# Patient Record
Sex: FEMALE | Race: WHITE | NOT HISPANIC OR LATINO | URBAN - METROPOLITAN AREA
[De-identification: names, ages, dates, MRNs, and addresses within clinical notes are randomized per-mention and may not be internally consistent; named-entity substitution may affect disease eponyms.]

---

## 2018-03-22 ENCOUNTER — OUTPATIENT (OUTPATIENT)
Dept: OUTPATIENT SERVICES | Facility: HOSPITAL | Age: 83
LOS: 1 days | Discharge: HOME | End: 2018-03-22

## 2018-03-23 DIAGNOSIS — I10 ESSENTIAL (PRIMARY) HYPERTENSION: ICD-10-CM

## 2019-04-11 ENCOUNTER — OUTPATIENT (OUTPATIENT)
Dept: OUTPATIENT SERVICES | Facility: HOSPITAL | Age: 84
LOS: 1 days | Discharge: HOME | End: 2019-04-11

## 2019-04-11 DIAGNOSIS — E78.5 HYPERLIPIDEMIA, UNSPECIFIED: ICD-10-CM

## 2019-04-11 DIAGNOSIS — I25.10 ATHEROSCLEROTIC HEART DISEASE OF NATIVE CORONARY ARTERY WITHOUT ANGINA PECTORIS: ICD-10-CM

## 2019-04-11 DIAGNOSIS — I10 ESSENTIAL (PRIMARY) HYPERTENSION: ICD-10-CM

## 2019-04-11 DIAGNOSIS — I48.91 UNSPECIFIED ATRIAL FIBRILLATION: ICD-10-CM

## 2019-04-13 ENCOUNTER — OUTPATIENT (OUTPATIENT)
Dept: OUTPATIENT SERVICES | Facility: HOSPITAL | Age: 84
LOS: 1 days | Discharge: HOME | End: 2019-04-13

## 2019-04-13 DIAGNOSIS — I48.91 UNSPECIFIED ATRIAL FIBRILLATION: ICD-10-CM

## 2019-04-13 DIAGNOSIS — E78.5 HYPERLIPIDEMIA, UNSPECIFIED: ICD-10-CM

## 2019-04-13 DIAGNOSIS — I25.10 ATHEROSCLEROTIC HEART DISEASE OF NATIVE CORONARY ARTERY WITHOUT ANGINA PECTORIS: ICD-10-CM

## 2019-04-13 DIAGNOSIS — I10 ESSENTIAL (PRIMARY) HYPERTENSION: ICD-10-CM

## 2019-08-05 ENCOUNTER — OUTPATIENT (OUTPATIENT)
Dept: OUTPATIENT SERVICES | Facility: HOSPITAL | Age: 84
LOS: 1 days | Discharge: HOME | End: 2019-08-05

## 2019-08-05 DIAGNOSIS — I25.10 ATHEROSCLEROTIC HEART DISEASE OF NATIVE CORONARY ARTERY WITHOUT ANGINA PECTORIS: ICD-10-CM

## 2019-08-05 DIAGNOSIS — I48.91 UNSPECIFIED ATRIAL FIBRILLATION: ICD-10-CM

## 2021-04-15 ENCOUNTER — INPATIENT (INPATIENT)
Facility: HOSPITAL | Age: 86
LOS: 6 days | Discharge: HOME HEALTH | End: 2021-04-22
Attending: INTERNAL MEDICINE | Admitting: INTERNAL MEDICINE
Payer: MEDICARE

## 2021-04-15 VITALS
RESPIRATION RATE: 20 BRPM | SYSTOLIC BLOOD PRESSURE: 108 MMHG | DIASTOLIC BLOOD PRESSURE: 66 MMHG | OXYGEN SATURATION: 95 % | HEART RATE: 122 BPM

## 2021-04-15 LAB
ALBUMIN SERPL ELPH-MCNC: 3 G/DL — LOW (ref 3.5–5.2)
ALBUMIN SERPL ELPH-MCNC: 3.2 G/DL — LOW (ref 3.5–5.2)
ALP SERPL-CCNC: 65 U/L — SIGNIFICANT CHANGE UP (ref 30–115)
ALP SERPL-CCNC: 67 U/L — SIGNIFICANT CHANGE UP (ref 30–115)
ALT FLD-CCNC: 45 U/L — HIGH (ref 0–41)
ALT FLD-CCNC: 46 U/L — HIGH (ref 0–41)
ANION GAP SERPL CALC-SCNC: 15 MMOL/L — HIGH (ref 7–14)
ANION GAP SERPL CALC-SCNC: 25 MMOL/L — HIGH (ref 7–14)
APTT BLD: 27.2 SEC — SIGNIFICANT CHANGE UP (ref 27–39.2)
AST SERPL-CCNC: 128 U/L — HIGH (ref 0–41)
AST SERPL-CCNC: 151 U/L — HIGH (ref 0–41)
BASE EXCESS BLDV CALC-SCNC: -17.1 MMOL/L — LOW (ref -2–2)
BASE EXCESS BLDV CALC-SCNC: -7.3 MMOL/L — LOW (ref -2–2)
BASOPHILS # BLD AUTO: 0.02 K/UL — SIGNIFICANT CHANGE UP (ref 0–0.2)
BASOPHILS NFR BLD AUTO: 0.1 % — SIGNIFICANT CHANGE UP (ref 0–1)
BILIRUB SERPL-MCNC: 1.5 MG/DL — HIGH (ref 0.2–1.2)
BILIRUB SERPL-MCNC: 1.7 MG/DL — HIGH (ref 0.2–1.2)
BLD GP AB SCN SERPL QL: SIGNIFICANT CHANGE UP
BUN SERPL-MCNC: 66 MG/DL — CRITICAL HIGH (ref 10–20)
BUN SERPL-MCNC: 73 MG/DL — CRITICAL HIGH (ref 10–20)
BURR CELLS BLD QL SMEAR: PRESENT — SIGNIFICANT CHANGE UP
CA-I SERPL-SCNC: 1.15 MMOL/L — SIGNIFICANT CHANGE UP (ref 1.12–1.3)
CA-I SERPL-SCNC: 1.15 MMOL/L — SIGNIFICANT CHANGE UP (ref 1.12–1.3)
CALCIUM SERPL-MCNC: 8.2 MG/DL — LOW (ref 8.5–10.1)
CALCIUM SERPL-MCNC: 9 MG/DL — SIGNIFICANT CHANGE UP (ref 8.5–10.1)
CHLORIDE SERPL-SCNC: 104 MMOL/L — SIGNIFICANT CHANGE UP (ref 98–110)
CHLORIDE SERPL-SCNC: 114 MMOL/L — HIGH (ref 98–110)
CK SERPL-CCNC: 6376 U/L — HIGH (ref 0–225)
CO2 SERPL-SCNC: 10 MMOL/L — LOW (ref 17–32)
CO2 SERPL-SCNC: 13 MMOL/L — LOW (ref 17–32)
CREAT SERPL-MCNC: 2.4 MG/DL — HIGH (ref 0.7–1.5)
CREAT SERPL-MCNC: 2.6 MG/DL — HIGH (ref 0.7–1.5)
EOSINOPHIL # BLD AUTO: 0 K/UL — SIGNIFICANT CHANGE UP (ref 0–0.7)
EOSINOPHIL NFR BLD AUTO: 0 % — SIGNIFICANT CHANGE UP (ref 0–8)
GAS PNL BLDV: 143 MMOL/L — SIGNIFICANT CHANGE UP (ref 136–145)
GAS PNL BLDV: 143 MMOL/L — SIGNIFICANT CHANGE UP (ref 136–145)
GAS PNL BLDV: SIGNIFICANT CHANGE UP
GAS PNL BLDV: SIGNIFICANT CHANGE UP
GLUCOSE SERPL-MCNC: 72 MG/DL — SIGNIFICANT CHANGE UP (ref 70–99)
GLUCOSE SERPL-MCNC: 90 MG/DL — SIGNIFICANT CHANGE UP (ref 70–99)
HCO3 BLDV-SCNC: 10 MMOL/L — LOW (ref 22–29)
HCO3 BLDV-SCNC: 19 MMOL/L — LOW (ref 22–29)
HCT VFR BLD CALC: 21.3 % — LOW (ref 37–47)
HCT VFR BLD CALC: 27.3 % — LOW (ref 37–47)
HCT VFR BLDA CALC: 27.9 % — LOW (ref 34–44)
HCT VFR BLDA CALC: 44.1 % — HIGH (ref 34–44)
HGB BLD CALC-MCNC: 14.4 G/DL — SIGNIFICANT CHANGE UP (ref 14–18)
HGB BLD CALC-MCNC: 9.1 G/DL — LOW (ref 14–18)
HGB BLD-MCNC: 6.5 G/DL — CRITICAL LOW (ref 12–16)
HGB BLD-MCNC: 8.8 G/DL — LOW (ref 12–16)
HOROWITZ INDEX BLDV+IHG-RTO: 21 — SIGNIFICANT CHANGE UP
HOROWITZ INDEX BLDV+IHG-RTO: 21 — SIGNIFICANT CHANGE UP
HYPOCHROMIA BLD QL: SIGNIFICANT CHANGE UP
IMM GRANULOCYTES NFR BLD AUTO: 1.4 % — HIGH (ref 0.1–0.3)
INR BLD: 2.57 RATIO — HIGH (ref 0.65–1.3)
LACTATE BLDV-MCNC: 2.8 MMOL/L — HIGH (ref 0.5–1.6)
LACTATE BLDV-MCNC: 9.2 MMOL/L — HIGH (ref 0.5–1.6)
LACTATE SERPL-SCNC: 3.1 MMOL/L — HIGH (ref 0.7–2)
LYMPHOCYTES # BLD AUTO: 0.83 K/UL — LOW (ref 1.2–3.4)
LYMPHOCYTES # BLD AUTO: 3.7 % — LOW (ref 20.5–51.1)
MACROCYTES BLD QL: SIGNIFICANT CHANGE UP
MAGNESIUM SERPL-MCNC: 2.7 MG/DL — HIGH (ref 1.8–2.4)
MAGNESIUM SERPL-MCNC: 2.9 MG/DL — HIGH (ref 1.8–2.4)
MANUAL SMEAR VERIFICATION: SIGNIFICANT CHANGE UP
MCHC RBC-ENTMCNC: 30.5 G/DL — LOW (ref 32–37)
MCHC RBC-ENTMCNC: 32.2 G/DL — SIGNIFICANT CHANGE UP (ref 32–37)
MCHC RBC-ENTMCNC: 32.8 PG — HIGH (ref 27–31)
MCHC RBC-ENTMCNC: 33.3 PG — HIGH (ref 27–31)
MCV RBC AUTO: 101.9 FL — HIGH (ref 81–99)
MCV RBC AUTO: 109.2 FL — HIGH (ref 81–99)
MONOCYTES # BLD AUTO: 2.15 K/UL — HIGH (ref 0.1–0.6)
MONOCYTES NFR BLD AUTO: 9.7 % — HIGH (ref 1.7–9.3)
NEUTROPHILS # BLD AUTO: 18.93 K/UL — HIGH (ref 1.4–6.5)
NEUTROPHILS NFR BLD AUTO: 85.1 % — HIGH (ref 42.2–75.2)
NEUTS BAND # BLD: 2 % — SIGNIFICANT CHANGE UP (ref 0–6)
NRBC # BLD: 0 /100 WBCS — SIGNIFICANT CHANGE UP (ref 0–0)
NRBC # BLD: 0 /100 WBCS — SIGNIFICANT CHANGE UP (ref 0–0)
NRBC # BLD: 0 /100 — SIGNIFICANT CHANGE UP (ref 0–0)
PCO2 BLDV: 25 MMHG — LOW (ref 39–42)
PCO2 BLDV: 39 MMHG — SIGNIFICANT CHANGE UP (ref 39–42)
PH BLDV: 7.19 — LOW (ref 7.26–7.43)
PH BLDV: 7.29 — SIGNIFICANT CHANGE UP (ref 7.26–7.43)
PLAT MORPH BLD: NORMAL — SIGNIFICANT CHANGE UP
PLATELET # BLD AUTO: 231 K/UL — SIGNIFICANT CHANGE UP (ref 130–400)
PLATELET # BLD AUTO: 290 K/UL — SIGNIFICANT CHANGE UP (ref 130–400)
PO2 BLDV: 20 MMHG — SIGNIFICANT CHANGE UP (ref 20–40)
PO2 BLDV: 28 MMHG — SIGNIFICANT CHANGE UP (ref 20–40)
POLYCHROMASIA BLD QL SMEAR: SLIGHT — SIGNIFICANT CHANGE UP
POTASSIUM BLDV-SCNC: 4.5 MMOL/L — SIGNIFICANT CHANGE UP (ref 3.3–5.6)
POTASSIUM BLDV-SCNC: 5.3 MMOL/L — SIGNIFICANT CHANGE UP (ref 3.3–5.6)
POTASSIUM SERPL-MCNC: 4.9 MMOL/L — SIGNIFICANT CHANGE UP (ref 3.5–5)
POTASSIUM SERPL-MCNC: 5.6 MMOL/L — HIGH (ref 3.5–5)
POTASSIUM SERPL-SCNC: 4.9 MMOL/L — SIGNIFICANT CHANGE UP (ref 3.5–5)
POTASSIUM SERPL-SCNC: 5.6 MMOL/L — HIGH (ref 3.5–5)
PROT SERPL-MCNC: 5.2 G/DL — LOW (ref 6–8)
PROT SERPL-MCNC: 5.4 G/DL — LOW (ref 6–8)
PROTHROM AB SERPL-ACNC: 29.5 SEC — HIGH (ref 9.95–12.87)
RAPID RVP RESULT: SIGNIFICANT CHANGE UP
RBC # BLD: 1.95 M/UL — LOW (ref 4.2–5.4)
RBC # BLD: 2.68 M/UL — LOW (ref 4.2–5.4)
RBC # FLD: 17.2 % — HIGH (ref 11.5–14.5)
RBC # FLD: 17.6 % — HIGH (ref 11.5–14.5)
RBC BLD AUTO: ABNORMAL
SAO2 % BLDV: 24 % — SIGNIFICANT CHANGE UP
SAO2 % BLDV: 35 % — SIGNIFICANT CHANGE UP
SARS-COV-2 RNA SPEC QL NAA+PROBE: SIGNIFICANT CHANGE UP
SCHISTOCYTES BLD QL AUTO: SLIGHT — SIGNIFICANT CHANGE UP
SODIUM SERPL-SCNC: 139 MMOL/L — SIGNIFICANT CHANGE UP (ref 135–146)
SODIUM SERPL-SCNC: 142 MMOL/L — SIGNIFICANT CHANGE UP (ref 135–146)
TROPONIN T SERPL-MCNC: 0.09 NG/ML — CRITICAL HIGH
WBC # BLD: 19.92 K/UL — HIGH (ref 4.8–10.8)
WBC # BLD: 22.25 K/UL — HIGH (ref 4.8–10.8)
WBC # FLD AUTO: 19.92 K/UL — HIGH (ref 4.8–10.8)
WBC # FLD AUTO: 22.25 K/UL — HIGH (ref 4.8–10.8)

## 2021-04-15 PROCEDURE — 72170 X-RAY EXAM OF PELVIS: CPT | Mod: 26

## 2021-04-15 PROCEDURE — 73552 X-RAY EXAM OF FEMUR 2/>: CPT | Mod: 26,RT

## 2021-04-15 PROCEDURE — 71045 X-RAY EXAM CHEST 1 VIEW: CPT | Mod: 26

## 2021-04-15 PROCEDURE — 71045 X-RAY EXAM CHEST 1 VIEW: CPT | Mod: 26,77

## 2021-04-15 PROCEDURE — 99291 CRITICAL CARE FIRST HOUR: CPT | Mod: CS

## 2021-04-15 PROCEDURE — 71250 CT THORAX DX C-: CPT | Mod: 26

## 2021-04-15 PROCEDURE — 72125 CT NECK SPINE W/O DYE: CPT | Mod: 26

## 2021-04-15 PROCEDURE — 74176 CT ABD & PELVIS W/O CONTRAST: CPT | Mod: 26

## 2021-04-15 PROCEDURE — 70450 CT HEAD/BRAIN W/O DYE: CPT | Mod: 26

## 2021-04-15 PROCEDURE — 51703 INSERT BLADDER CATH COMPLEX: CPT | Mod: 59

## 2021-04-15 PROCEDURE — 43752 NASAL/OROGASTRIC W/TUBE PLMT: CPT

## 2021-04-15 PROCEDURE — 99284 EMERGENCY DEPT VISIT MOD MDM: CPT

## 2021-04-15 PROCEDURE — 99223 1ST HOSP IP/OBS HIGH 75: CPT

## 2021-04-15 RX ORDER — SODIUM CHLORIDE 9 MG/ML
250 INJECTION INTRAMUSCULAR; INTRAVENOUS; SUBCUTANEOUS ONCE
Refills: 0 | Status: COMPLETED | OUTPATIENT
Start: 2021-04-15 | End: 2021-04-15

## 2021-04-15 RX ORDER — DEXTROSE 50 % IN WATER 50 %
50 SYRINGE (ML) INTRAVENOUS ONCE
Refills: 0 | Status: COMPLETED | OUTPATIENT
Start: 2021-04-15 | End: 2021-04-15

## 2021-04-15 RX ORDER — PANTOPRAZOLE SODIUM 20 MG/1
8 TABLET, DELAYED RELEASE ORAL
Qty: 80 | Refills: 0 | Status: DISCONTINUED | OUTPATIENT
Start: 2021-04-15 | End: 2021-04-20

## 2021-04-15 RX ORDER — SODIUM CHLORIDE 9 MG/ML
1000 INJECTION INTRAMUSCULAR; INTRAVENOUS; SUBCUTANEOUS
Refills: 0 | Status: DISCONTINUED | OUTPATIENT
Start: 2021-04-15 | End: 2021-04-16

## 2021-04-15 RX ORDER — METOPROLOL TARTRATE 50 MG
0 TABLET ORAL
Qty: 0 | Refills: 1 | DISCHARGE

## 2021-04-15 RX ORDER — ATORVASTATIN CALCIUM 80 MG/1
40 TABLET, FILM COATED ORAL AT BEDTIME
Refills: 0 | Status: DISCONTINUED | OUTPATIENT
Start: 2021-04-15 | End: 2021-04-16

## 2021-04-15 RX ORDER — PANTOPRAZOLE SODIUM 20 MG/1
80 TABLET, DELAYED RELEASE ORAL ONCE
Refills: 0 | Status: COMPLETED | OUTPATIENT
Start: 2021-04-15 | End: 2021-04-15

## 2021-04-15 RX ORDER — CHLORHEXIDINE GLUCONATE 213 G/1000ML
1 SOLUTION TOPICAL
Refills: 0 | Status: DISCONTINUED | OUTPATIENT
Start: 2021-04-15 | End: 2021-04-22

## 2021-04-15 RX ORDER — METOPROLOL TARTRATE 50 MG
75 TABLET ORAL DAILY
Refills: 0 | Status: DISCONTINUED | OUTPATIENT
Start: 2021-04-15 | End: 2021-04-16

## 2021-04-15 RX ORDER — PIPERACILLIN AND TAZOBACTAM 4; .5 G/20ML; G/20ML
3.38 INJECTION, POWDER, LYOPHILIZED, FOR SOLUTION INTRAVENOUS ONCE
Refills: 0 | Status: COMPLETED | OUTPATIENT
Start: 2021-04-15 | End: 2021-04-15

## 2021-04-15 RX ORDER — ALPRAZOLAM 0.25 MG
0.25 TABLET ORAL DAILY
Refills: 0 | Status: DISCONTINUED | OUTPATIENT
Start: 2021-04-15 | End: 2021-04-22

## 2021-04-15 RX ORDER — MEROPENEM 1 G/30ML
500 INJECTION INTRAVENOUS EVERY 12 HOURS
Refills: 0 | Status: DISCONTINUED | OUTPATIENT
Start: 2021-04-15 | End: 2021-04-16

## 2021-04-15 RX ORDER — METRONIDAZOLE 500 MG
500 TABLET ORAL ONCE
Refills: 0 | Status: COMPLETED | OUTPATIENT
Start: 2021-04-15 | End: 2021-04-15

## 2021-04-15 RX ADMIN — SODIUM CHLORIDE 75 MILLILITER(S): 9 INJECTION INTRAMUSCULAR; INTRAVENOUS; SUBCUTANEOUS at 21:12

## 2021-04-15 RX ADMIN — PIPERACILLIN AND TAZOBACTAM 200 GRAM(S): 4; .5 INJECTION, POWDER, LYOPHILIZED, FOR SOLUTION INTRAVENOUS at 18:35

## 2021-04-15 RX ADMIN — Medication 100 MILLIGRAM(S): at 17:53

## 2021-04-15 RX ADMIN — PANTOPRAZOLE SODIUM 10 MG/HR: 20 TABLET, DELAYED RELEASE ORAL at 13:54

## 2021-04-15 RX ADMIN — PANTOPRAZOLE SODIUM 80 MILLIGRAM(S): 20 TABLET, DELAYED RELEASE ORAL at 13:05

## 2021-04-15 RX ADMIN — SODIUM CHLORIDE 500 MILLILITER(S): 9 INJECTION INTRAMUSCULAR; INTRAVENOUS; SUBCUTANEOUS at 13:54

## 2021-04-15 RX ADMIN — Medication 50 MILLILITER(S): at 12:55

## 2021-04-15 RX ADMIN — PANTOPRAZOLE SODIUM 10 MG/HR: 20 TABLET, DELAYED RELEASE ORAL at 18:35

## 2021-04-15 RX ADMIN — SODIUM CHLORIDE 250 MILLILITER(S): 9 INJECTION INTRAMUSCULAR; INTRAVENOUS; SUBCUTANEOUS at 15:01

## 2021-04-15 RX ADMIN — Medication 50 MILLILITER(S): at 12:50

## 2021-04-15 NOTE — ED PROVIDER NOTE - PROGRESS NOTE DETAILS
pt with "low" glucose in field, on arrival <10 on fingerstick, given 2 amps D50 with repeat glucose 11 confirmed with other glucometer, vbg blood from pre-dextrose stick 80, blood redrawn from vein and tested, glucose 295, fingers cold and pale, likely spurious fingersticks radiology reports perforated gastric ulcer. surgery consulted GUS Petty aware. patient given zosyn and IV flagyl. patient to remain NPO. family aware of findings spoke with patient and family regarding results of lab testing. ordered PRBC x 2. patient given iv bolus x 2 of 250 ns. patient given protonix bolus and drip . will hold xarelto. last taken last night spoke with GUS Hernadez of GI who will evlaute patient rpt lactate improved. patient evaluated by ICU will admit to critical care dr. hanna aware of admission

## 2021-04-15 NOTE — H&P ADULT - NSHPPHYSICALEXAM_GEN_ALL_CORE
PHYSICAL EXAM:  GENERAL: NAD, well-groomed, well-developed  HEAD:  Atraumatic, Normocephalic  EYES: Pale conjunctivae   NECK: Supple, No JVD, Normal thyroid  NERVOUS SYSTEM:  Alert & Oriented X3, Good concentration; Motor Strength 5/5 B/L upper and lower extremities; DTRs 2+ intact and symmetric  CHEST/LUNG: Clear to percussion bilaterally; No rales, rhonchi, wheezing, or rubs  HEART: Regular rate and rhythm; No murmurs, rubs, or gallops  ABDOMEN: Soft, Nontender, Nondistended; Bowel sounds present  EXTREMITIES:  2+ Peripheral Pulses, No clubbing, cyanosis, or edema  LYMPH: No lymphadenopathy noted  SKIN: No rashes or lesions

## 2021-04-15 NOTE — CONSULT NOTE ADULT - ASSESSMENT
89 F with PMHx of [place] on Xarelto, GERD (diagnosed >5 years ago with no EGD/colonoscopy) on PPI, with intermittent dark stools, found down with CT findings of punctate foci of free intraperitoneal air in the upper abdomen    -no physical exam findings to suggest acute surgical abdomen   -no indication for surgical intervention at this time   -please place NG tube to low continuous suction, communicated to ED PA  -recommend keeping patient NPO with IV abx  -GI for EGD due to concern for GI bleed   -recommend admission to ICU for continued resuscitation   -surgery will continue to follow   -plan discussed with Dr. Hdz

## 2021-04-15 NOTE — CONSULT NOTE ADULT - SUBJECTIVE AND OBJECTIVE BOX
RAVI CECILLE [id number]    HPI:  Patient is a 89 F with PMHx of A.fib on Xarelto, GERD (diagnosed >5 years ago with no EGD/colonoscopy) on PPI. Patient presents after being found down after apparent fall this morning. Patient lives alone and last known well was last night when her son spoke to her on the phone. He tried to call her this morning, but she did not answer so he drove to her home and found her on the floor between her bedroom and bathroom. He says that when he found her she was incoherent. She was brought to the ED where her glucose was found to be 10 and she was given 2 amps of D50. Further labs showed CK >6000, troponin 0.09, WBC 22, Hgb 6.5. She was given 750 cc of NS and 2 U pRBC ordered. Pan scan obtained to rule out any traumatic injuries, which showed "punctate foci of free intraperitoneal air in the upper abdomen". STAT surgery consult was placed.     Patient was examined with her son at bedside who provided additional history. She is poor historian, but reports a few weeks of intermittent dark stools. Per her son, she has reported feeling weak and tired for the past few days after receiving the COVID vaccine. She has a history of A fib and is on Xarelto, follows up regularly with her cardiologist (Dr. Tesfaye).     Patient was confused but vitally stable other than tachycardia in the 110's. She denies any pain and is non-tender on abdominal exam. Per patient's son, she does not have a DNR/DNI or advanced directive and wants everything done for her at this time due to her independent functional status prior to this admission.       PAST MEDICAL & SURGICAL HISTORY:  Atrial fibrillation  GERD (gastroesophageal reflux disease)  HTN (hypertension)    MEDICATIONS  (STANDING):  metroNIDAZOLE  IVPB 500 milliGRAM(s) IV Intermittent Once  pantoprazole Infusion 8 mG/Hr (10 mL/Hr) IV Continuous <Continuous>  piperacillin/tazobactam IVPB. 3.375 Gram(s) IV Intermittent Once    MEDICATIONS  (PRN):      Allergies    No Known Allergies    Intolerances        REVIEW OF SYSTEMS    [ x ] A ten-point review of systems was otherwise negative except as noted.  [ ] Due to altered mental status/intubation, subjective information were not able to be obtained from the patient. History was obtained, to the extent possible, from review of the chart and collateral sources of information.      Vital Signs Last 24 Hrs  T(C): 36 (15 Apr 2021 15:19), Max: 36 (15 Apr 2021 15:19)  T(F): 96.8 (15 Apr 2021 15:19), Max: 96.8 (15 Apr 2021 15:19)  HR: 114 (15 Apr 2021 15:19) (114 - 122)  BP: 131/64 (15 Apr 2021 15:19) (108/66 - 143/61)  BP(mean): --  RR: 18 (15 Apr 2021 15:19) (18 - 20)  SpO2: 95% (15 Apr 2021 12:14) (95% - 95%)    PHYSICAL EXAM:  GENERAL: NAD, pale, confused   CHEST/LUNG: Clear to auscultation bilaterally  HEART: sinus tachycardia  ABDOMEN: Soft, Nontender, Nondistended, small non-tender/non-reducible umbilical hernia   EXTREMITIES:  No clubbing, cyanosis, or edema      Labs:  CAPILLARY BLOOD GLUCOSE      POCT Blood Glucose.: 292 mg/dL (15 Apr 2021 13:02)  POCT Blood Glucose.: 11 mg/dL (15 Apr 2021 12:55)  POCT Blood Glucose.: 84 mg/dL (15 Apr 2021 12:54)  POCT Blood Glucose.: 14 mg/dL (15 Apr 2021 12:50)  POCT Blood Glucose.: <10 mg/dL (15 Apr 2021 12:19)                          6.5    22.25 )-----------( 290      ( 15 Apr 2021 12:53 )             21.3       Auto Neutrophil %: 85.1 % (04-15-21 @ 12:53)  Auto Immature Granulocyte %: 1.4 % (04-15-21 @ 12:53)  Band Neutrophils %: 2.0 % (04-15-21 @ 12:53)    04-15    139  |  104  |  66<HH>  ----------------------------<  72  5.6<H>   |  10<L>  |  2.6<H>      Calcium, Total Serum: 9.0 mg/dL (04-15-21 @ 12:53)      LFTs:             5.4  | 1.5  | 128      ------------------[65      ( 15 Apr 2021 12:53 )  3.2  | x    | 45          Lipase:x      Amylase:x         Blood Gas Venous - Lactate: 9.2 mmoL/L (04-15-21 @ 13:16)      Coags:     29.50  ----< 2.57    ( 15 Apr 2021 12:53 )     27.2        CARDIAC MARKERS ( 15 Apr 2021 12:53 )  x     / 0.09 ng/mL / 6376 U/L / x     / x                RADIOLOGY & ADDITIONAL STUDIES:    < from: CT Chest No Cont (04.15.21 @ 14:43) >  Free intraperitoneal air in the upper abdomen suggestive of bowel perforation of unclear origin.    Age indeterminate compression deformity of L1 as well as age-indeterminate cortical irregularity to the coccyx. Findings are consistent with fracture of indeterminate age. Clinical correlation for point tenderness is recommended.    Distended gallbladder    Indeterminate fat-containing lesion within the right hip measuring 10 cm    < end of copied text >

## 2021-04-15 NOTE — CONSULT NOTE ADULT - ATTENDING COMMENTS
Pt seen and examined.  Agree with above, She is awake and alert lying in bed in NAD.  She denies any chest or abdominal pain. NGT with clear, nonbloody output.    Vitals stable, pt receiving pRBCs  Awake, alert, answers questions appropriately in NAD  Abd soft, nontender, nondistended, no rebound or guarding, no peritoneal signs    Labs and imaging reviewed. Tiny punctate focus of extraperitoneal air near liver; no secondary signs of inflammation or injury in abdomen or pelvis.    Plan:  Admit to medical ICU  No surgical intervention  Can D/C NGT  Serial abdominal exams  Will follow
Perforated duodenal ulcer on CT scan  -STAT Surgical consult and evaluation   -NPO  -Given elevated troponin and CK patient will need cardiology evaluation and risk stratification prior to any intervention  -ECHO  -sepsis work-up  -Maintain Hemodynamic Stability   -Monitor CBC  -Negative COVID-19 Test within 3 days for intervention   -CMP,Optimize Electrolytes  -PT,PTT,INR  -EKG, Chest-Xray   -Transfuse prn to hgb >8  -Two large bore IV lines  -Continue PPI drip  -ICU evaluation  -Monitor Vital Signs  -Monitor Stool For blood, frequency, consistency, melena  -Active Type and Screen  -Iron Studies, Folate, Vitamin B12 levels

## 2021-04-15 NOTE — ED ADULT NURSE NOTE - NSIMPLEMENTINTERV_GEN_ALL_ED
Implemented All Fall with Harm Risk Interventions:  [place] to call system. Call bell, personal items and telephone within reach. Instruct patient to call for assistance. Room bathroom lighting operational. Non-slip footwear when patient is off stretcher. Physically safe environment: no spills, clutter or unnecessary equipment. Stretcher in lowest position, wheels locked, appropriate side rails in place. Provide visual cue, wrist band, yellow gown, etc. Monitor gait and stability. Monitor for mental status changes and reorient to person, place, and time. Review medications for side effects contributing to fall risk. Reinforce activity limits and safety measures with patient and family. Provide visual clues: red socks.

## 2021-04-15 NOTE — H&P ADULT - NSICDXPASTMEDICALHX_GEN_ALL_CORE_FT
PAST MEDICAL HISTORY:  Atrial fibrillation     GERD (gastroesophageal reflux disease)     HTN (hypertension)

## 2021-04-15 NOTE — PROCEDURE NOTE - NSURITECHNIQUE_GU_A_CORE
Proper hand hygiene was performed/Sterile gloves were worn for the duration of the procedure/The catheter was appropriately lubricated/The catheter was secured with a securement device (e.g. StatLock)/The urinary drainage system is closed at the end of the procedure

## 2021-04-15 NOTE — H&P ADULT - ASSESSMENT
A/P:     1. GI bleeding / Anemia   - hold xarelto   - transfuse 1 -2 units PRBC   - GI fu appreciated     2. TYRESE / Acidosis   - IV fluids   - serial lactate until clears   - replete electrolytes     3. Elevated WBC   - possible abdominal source   - will start broad spectrum antibiotics   - surgical fu   - serial abdominal exams    4. Rhabdo  - IV fluids  - repeat cpk     5. imaging reviewed personally + free air     6 . DVT ppx     Prognosis guraded

## 2021-04-15 NOTE — H&P ADULT - NSHPREVIEWOFSYSTEMS_GEN_ALL_CORE
REVIEW OF SYSTEMS  General:	no pain    Skin/Breast:  no rashes   Ophthalmologic: no blurry vision   ENMT:	no thrush   Respiratory and Thorax: no sob  Cardiovascular:	no chest pain   Gastrointestinal:	no diarrhea   Genitourinary:	no dysuria   Musculoskeletal:	no weakness   Neurological:	no weakness   Psychiatric:	no hallucinations

## 2021-04-15 NOTE — ED PROVIDER NOTE - CARE PLAN
Principal Discharge DX:	GI bleed  Secondary Diagnosis:	Perforated ulcer  Secondary Diagnosis:	TYRESE (acute kidney injury)  Secondary Diagnosis:	Rhabdomyolysis

## 2021-04-15 NOTE — CONSULT NOTE ADULT - ASSESSMENT
89yFemale pmh A-Fib on Eliquis last dose yesterday, GERD, HTN presents for fall. Patient reports intermittent dark brown stools and GI was called for her anemia.    Problem 1-Anemia with intermittent dark stools as per patient  ddx PUD, malignancy, retroperitoneal hemorrhage, diverticulosis  -rectal exam without evidence for active GI bleeding   Rec  -Given elevated troponin and CK patient will need cardiology evaluation and risk stratification prior to any intervention  -ECHO  -sepsis work-up  -Eliquis will need to be off 4 days prior to procedure   -Maintain Hemodynamic Stability   -Monitor CBC  -Negative COVID-19 Test within 3 days for intervention   -CMP,Optimize Electrolytes  -PT,PTT,INR  -EKG, Chest-Xray   -Transfuse prn to hgb >8  -Two large bore IV lines  -Continue PPI BID  -ICU evaluation  -Monitor Vital Signs  -Keep patient NPO until h and h above 8  -Monitor Stool For blood, frequency, consistency, melena  -Hold Anticoagulation if benefits outweigh risks  -Active Type and Screen  -Iron Studies, Folate, Vitamin B12 levels     Problem 2-Altered liver chemistries   ddx cholestasis of sepsis, rhabdo , infectious etiology  Rec  -RUQ ultrasound  -treat sepsis  -If LFTs fail to improve Check Hepatitis B Sag, Hep B SAB, Hep A Ab, Hep C Ab,Smooth Muscle Antibody, Anti LK M1 antibody, AMA, KEVIN, Ceruloplasmin, Ferritin, Alpha-1-antitrypsin, CMV, Herpes serologies, EBV serologies,   89yFemale pmh A-Fib on Eliquis last dose yesterday, GERD, HTN presents for fall. Patient reports intermittent dark brown stools and GI was called for her anemia.    Problem 1-Anemia with intermittent dark stools as per patient  ddx PUD, malignancy, retroperitoneal hemorrhage, diverticulosis  -rectal exam without evidence for active GI bleeding   Rec  -Given elevated troponin and CK patient will need cardiology evaluation and risk stratification prior to any intervention  -ECHO  -sepsis work-up  -Eliquis will need to be off 4 days prior to procedure, INR below 1.5 optimally for case   -Maintain Hemodynamic Stability   -Monitor CBC  -Negative COVID-19 Test within 3 days for intervention   -CMP,Optimize Electrolytes  -PT,PTT,INR  -EKG, Chest-Xray   -Transfuse prn to hgb >8  -Two large bore IV lines  -Continue PPI BID  -ICU evaluation  -Monitor Vital Signs  -Keep patient NPO until h and h above 8  -Monitor Stool For blood, frequency, consistency, melena  -Hold Anticoagulation if benefits outweigh risks  -Active Type and Screen  -Iron Studies, Folate, Vitamin B12 levels     Problem 2-Altered liver chemistries   ddx cholestasis of sepsis, rhabdo , infectious etiology  Rec  -RUQ ultrasound  -treat sepsis  -If LFTs fail to improve Check Hepatitis B Sag, Hep B SAB, Hep A Ab, Hep C Ab,Smooth Muscle Antibody, Anti LK M1 antibody, AMA, KEVIN, Ceruloplasmin, Ferritin, Alpha-1-antitrypsin, CMV, Herpes serologies, EBV serologies,   89yFemale pmh A-Fib on Eliquis last dose yesterday, GERD, HTN presents for fall. Patient reports intermittent dark brown stools and GI was called for her anemia.    Problem 1-Anemia with intermittent dark stools as per patient  ddx PUD, malignancy, retroperitoneal hemorrhage, diverticulosis  -rectal exam without evidence for active GI bleeding   Rec  ADDENDUM Called by ER patient with likely perforated duodenal ulcer on CT scan  -STAT Surgical consult and evaluation   -Given elevated troponin and CK patient will need cardiology evaluation and risk stratification prior to any intervention  -ECHO  -sepsis work-up  -Eliquis will need to be off 4 days prior to procedure, INR below 1.5 optimally for case   -Maintain Hemodynamic Stability   -Monitor CBC  -Negative COVID-19 Test within 3 days for intervention   -CMP,Optimize Electrolytes  -PT,PTT,INR  -EKG, Chest-Xray   -Transfuse prn to hgb >8  -Two large bore IV lines  -Continue PPI BID  -ICU evaluation  -Monitor Vital Signs  -Keep patient NPO until h and h above 8  -Monitor Stool For blood, frequency, consistency, melena  -Hold Anticoagulation if benefits outweigh risks  -Active Type and Screen  -Iron Studies, Folate, Vitamin B12 levels     Problem 2-Altered liver chemistries   ddx cholestasis of sepsis, rhabdo , infectious etiology  Rec  -RUQ ultrasound  -treat sepsis  -If LFTs fail to improve Check Hepatitis B Sag, Hep B SAB, Hep A Ab, Hep C Ab,Smooth Muscle Antibody, Anti LK M1 antibody, AMA, KEVIN, Ceruloplasmin, Ferritin, Alpha-1-antitrypsin, CMV, Herpes serologies, EBV serologies,   89yFemale pmh A-Fib on Eliquis last dose yesterday, GERD, HTN presents for fall. Patient reports intermittent dark brown stools and GI was called for her anemia.    Problem 1-Anemia with intermittent dark stools as per patient  ddx PUD, malignancy, retroperitoneal hemorrhage, diverticulosis  -rectal exam without evidence for active GI bleeding   Free intraperitoneal air in the upper abdomen suggestive of bowel perforation of unclear origin.  Rec  ADDENDUM Called by ER patient with likely perforated duodenal ulcer on CT scan  -STAT Surgical consult and evaluation   -NPO  -Given elevated troponin and CK patient will need cardiology evaluation and risk stratification prior to any intervention  -ECHO  -sepsis work-up  -Maintain Hemodynamic Stability   -Monitor CBC  -Negative COVID-19 Test within 3 days for intervention   -CMP,Optimize Electrolytes  -PT,PTT,INR  -EKG, Chest-Xray   -Transfuse prn to hgb >8  -Two large bore IV lines  -Continue PPI drip  -ICU evaluation  -Monitor Vital Signs  -Monitor Stool For blood, frequency, consistency, melena  -Active Type and Screen  -Iron Studies, Folate, Vitamin B12 levels     Problem 2-Altered liver chemistries   -distended gallbladder  ddx cholestasis of sepsis, rhabdo , infectious etiology  Rec  -RUQ ultrasound  -treat sepsis  -surgery on board  -If LFTs fail to improve Check Hepatitis B Sag, Hep B SAB, Hep A Ab, Hep C Ab,Smooth Muscle Antibody, Anti LK M1 antibody, AMA, KEVIN, Ceruloplasmin, Ferritin, Alpha-1-antitrypsin, CMV, Herpes serologies, EBV serologies,     Problem 3-Age indeterminate compression deformity of L1 as well as age-indeterminate cortical irregularity to the coccyx. Findings are consistent with fracture of indeterminate age. Clinical correlation for point tenderness is recommended.  Rec  - Care as per primary team     Problem 4-Indeterminate fat-containing lesion within the right hip measuring 10 cm  Rec  - Care as per primary team

## 2021-04-15 NOTE — ED PROVIDER NOTE - OBJECTIVE STATEMENT
89 y/o female brought in by family for evaluation . patient was found on floor , confused as per grandson today. As per patient, she has been weak since receiving second moderna vaccine last week. patient with decreased po intake and decreased urine output. patient denies any back pain. patient with hx of a.fib on xarelto, GERD on PPI. patient states she has been having black stools over past few weeks. As per family, patient is weak and speech is slow. patient states she is bumping into things.

## 2021-04-15 NOTE — ED ADULT NURSE NOTE - ED CARDIAC RATE
Patient laying in stretcher, constant itching. Patient undressed and hospital gown provided. STEPHEN Dunbar notified of patient constant itching with hives noted over entire body. Patient denies swelling of tongue or throat, denies SOB or pain. Patient and mother instructed to notify RN immediately for any of these symptoms  
Pt states is feeling better, hives appear to be decreasing. Pt denies SOB, states never felt SOB. YARITZA Dunbar at BS for re-eval.   
tachycardic

## 2021-04-15 NOTE — H&P ADULT - NSHPLABSRESULTS_GEN_ALL_CORE
labs  04-15    139  |  104  |  66<HH>  ----------------------------<  72  5.6<H>   |  10<L>  |  2.6<H>    Ca    9.0      15 Apr 2021 12:53  Mg     2.9     04-15    TPro  5.4<L>  /  Alb  3.2<L>  /  TBili  1.5<H>  /  DBili  x   /  AST  128<H>  /  ALT  45<H>  /  AlkPhos  65  04-15                          6.5    22.25 )-----------( 290      ( 15 Apr 2021 12:53 )             21.3         PT/INR - ( 15 Apr 2021 12:53 )   PT: 29.50 sec;   INR: 2.57 ratio         PTT - ( 15 Apr 2021 12:53 )  PTT:27.2 sec

## 2021-04-15 NOTE — ED ADULT TRIAGE NOTE - CHIEF COMPLAINT QUOTE
BIBA family states they spoke to pt at 7PM yesterday and then this AM went to check on her she was found on the floor and confused.  FS reading is "LOW"

## 2021-04-15 NOTE — ED ADULT NURSE NOTE - OBJECTIVE STATEMENT
pt found on floor by family, pt lives alone and cannot remember falling, pt is AAOX3 in  ED, resp easy and unlabored, pt placed on cardiac monitor noted to be in sinus tachycardia, skin cool and dry to touch, pt pale and cyanosis noted to fingertips and toes, multiple bruises scattered and in different stages of healing noted to bilat lower extremities and swelling noted to right thigh, non-painful when palpating, abdomen soft and non-tender, melana noted upon rectal exam by PA

## 2021-04-15 NOTE — ED PROVIDER NOTE - ATTENDING CONTRIBUTION TO CARE
SEEN WITH PA  89y female above pmh lives alone ambulates without assistance BIBEMS for weakness/?fall with "low sugar" in field, on exam vital signs appreciated, weak and pale RR 24 no distress, AAO x 3 head nc/at, perrla, conj pale dry mm neck supple cor tachy, irreg, lungs diminished at bases abdomen +bs, snt/nd pelvis stable FROM x 4 with scattered areas of ecchymosis and soft tissue prominence to right anterior thigh, neuro nonfocal, stool black (see progress notes for glucose documentation-never hypoglycemic), labs and studies reviewed and d/w GI, surgery, and ICU (NGT placed by surgery), will admit for continued resuscitation, possible endoscopy, serial abd exams

## 2021-04-15 NOTE — CONSULT NOTE ADULT - SUBJECTIVE AND OBJECTIVE BOX
Chief complaint/Reason for consult: intermittent melenic stools     HPI: 89yFemale pmh A-Fib on Eliquis last dose yesterday, GERD, HTN presents for fall. Patient reports intermittent dark brown stools and GI was called for her anemia. Currently Patient denies nausea, vomiting, hematemesis, blood in stool, diarrhea, constipation, abdominal pain. Patient has never had an endoscopy or colonoscopy before.      PAST MEDICAL & SURGICAL HISTORY:   Atrial fibrillation    GERD (gastroesophageal reflux disease)    HTN (hypertension)          Family history:  FAMILY HISTORY:    No GI cancers in first or second degree relatives    Social History: No smoking. No alcohol. No illegal drug use.    Allergies:   No Known Allergies      MEDICATIONS  (STANDING):  pantoprazole Infusion 8 mG/Hr (10 mL/Hr) IV Continuous <Continuous>          REVIEW OF SYSTEMS  General:  No weight loss, fevers, or chills.  Eyes:  No reported pain or visual changes  ENT:  No sore throat or runny nose.  NECK: No stiffness or lymphadenopathy  CV:  No chest pain or palpitations.  Resp:  No shortness of breath, cough, wheezing or hemoptysis  GI:  No abdominal pain, nausea, vomiting, dysphagia, diarrhea or constipation. No active rectal bleeding, +intermittent melenic stools, or hematemesis.  Neuro:  No tingling, numbness       VITALS:   T(F): --  HR: 115 (04-15-21 @ 13:53) (115 - 122)  BP: 113/54 (04-15-21 @ 13:53) (108/66 - 143/61)  RR: 18 (04-15-21 @ 13:25) (18 - 20)  SpO2: 95% (04-15-21 @ 12:14) (95% - 95%)    PHYSICAL EXAM:  GENERAL: AAOx3, no acute distress.  HEAD:  Atraumatic, Normocephalic  EYES: conjunctiva and sclera clear  NECK: Supple, No thyromegaly   CHEST/LUNG: Clear to auscultation bilaterally; No wheeze, rhonchi, or rales  HEART: Regular rate and rhythm; normal S1, S2, No murmurs.  ABDOMEN: Soft, nontender, nondistended; Bowel sounds present  NEUROLOGY: No asterixis or tremor  SKIN: Intact, no jaundice  Rectal exam-trace black stool in rectal vault        LABS:  04-15    139  |  104  |  66<HH>  ----------------------------<  72  5.6<H>   |  10<L>  |  2.6<H>    Ca    9.0      15 Apr 2021 12:53  Mg     2.9     04-15    TPro  5.4<L>  /  Alb  3.2<L>  /  TBili  1.5<H>  /  DBili  x   /  AST  128<H>  /  ALT  45<H>  /  AlkPhos  65  04-15                          6.5    22.25 )-----------( 290      ( 15 Apr 2021 12:53 )             21.3     LIVER FUNCTIONS - ( 15 Apr 2021 12:53 )  Alb: 3.2 g/dL / Pro: 5.4 g/dL / ALK PHOS: 65 U/L / ALT: 45 U/L / AST: 128 U/L / GGT: x           PT/INR - ( 15 Apr 2021 12:53 )   PT: 29.50 sec;   INR: 2.57 ratio         PTT - ( 15 Apr 2021 12:53 )  PTT:27.2 sec    IMAGING:         Chief complaint/Reason for consult: intermittent melenic stools     HPI: 89yFemale pmh A-Fib on Eliquis last dose yesterday, GERD, HTN presents for fall. Patient reports intermittent dark brown stools and GI was called for her anemia. Currently Patient denies nausea, vomiting, hematemesis, blood in stool, diarrhea, constipation, abdominal pain. Patient has never had an endoscopy or colonoscopy before.      PAST MEDICAL & SURGICAL HISTORY:   Atrial fibrillation    GERD (gastroesophageal reflux disease)    HTN (hypertension)          Family history:  FAMILY HISTORY:    No GI cancers in first or second degree relatives    Social History: No smoking. No alcohol. No illegal drug use.    Allergies:   No Known Allergies      MEDICATIONS  (STANDING):  pantoprazole Infusion 8 mG/Hr (10 mL/Hr) IV Continuous <Continuous>          REVIEW OF SYSTEMS  General:  No weight loss, fevers, or chills.  Eyes:  No reported pain or visual changes  ENT:  No sore throat or runny nose.  NECK: No stiffness or lymphadenopathy  CV:  No chest pain or palpitations.  Resp:  No shortness of breath, cough, wheezing or hemoptysis  GI:  No abdominal pain, nausea, vomiting, dysphagia, diarrhea or constipation. No active rectal bleeding, +intermittent melenic stools, or hematemesis.  Neuro:  No tingling, numbness       VITALS:   T(F): --  HR: 115 (04-15-21 @ 13:53) (115 - 122)  BP: 113/54 (04-15-21 @ 13:53) (108/66 - 143/61)  RR: 18 (04-15-21 @ 13:25) (18 - 20)  SpO2: 95% (04-15-21 @ 12:14) (95% - 95%)    PHYSICAL EXAM:  GENERAL: AAOx3, no acute distress.  HEAD:  Atraumatic, Normocephalic  EYES: conjunctiva and sclera clear  NECK: Supple, No thyromegaly   CHEST/LUNG: Clear to auscultation bilaterally; No wheeze, rhonchi, or rales  HEART: Regular rate and rhythm; normal S1, S2, No murmurs.  ABDOMEN: Soft, nontender, nondistended; Bowel sounds present  NEUROLOGY: No asterixis or tremor  SKIN: Intact, no jaundice  Rectal exam-trace black stool in rectal vault        LABS:  04-15    139  |  104  |  66<HH>  ----------------------------<  72  5.6<H>   |  10<L>  |  2.6<H>    Ca    9.0      15 Apr 2021 12:53  Mg     2.9     04-15    TPro  5.4<L>  /  Alb  3.2<L>  /  TBili  1.5<H>  /  DBili  x   /  AST  128<H>  /  ALT  45<H>  /  AlkPhos  65  04-15                          6.5    22.25 )-----------( 290      ( 15 Apr 2021 12:53 )             21.3     LIVER FUNCTIONS - ( 15 Apr 2021 12:53 )  Alb: 3.2 g/dL / Pro: 5.4 g/dL / ALK PHOS: 65 U/L / ALT: 45 U/L / AST: 128 U/L / GGT: x           PT/INR - ( 15 Apr 2021 12:53 )   PT: 29.50 sec;   INR: 2.57 ratio         PTT - ( 15 Apr 2021 12:53 )  PTT:27.2 sec    IMAGING:    < from: CT Abdomen and Pelvis No Cont (04.15.21 @ 14:43) >    EXAM:  CT CHEST        EXAM:  CT ABDOMEN AND PELVIS            PROCEDURE DATE:  04/15/2021            INTERPRETATION:  CLINICAL STATEMENT: Status post fall    TECHNIQUE: Contiguous axial CT images were obtained from the chest to the pubic symphysiswithout intravenous contrast.  Oral contrast was not given.  Reformatted images in the coronal and sagittal planes were acquired. Additional MIP images were obtained.    COMPARISON CT: None.    Study is severely limited in evaluation due to motion artifact.      FINDINGS:    CHEST: There is a trace nonspecific pericardial effusion. The thyroid gland is present with the right thyroid extending into the superior mediastinum. The thyroid gland is incompletely evaluated on CT. There is no definite evidence of thoracic adenopathy. The heart size is enlarged. There is atherosclerosis. The thoracic aorta is normal in caliber. The central tracheobronchial tree is patent. There is no consolidation, pneumothorax or pleural effusion. There are patchy areas of subsegmental atelectasis.    HEPATOBILIARY: The gallbladder is distended.    SPLEEN: Unremarkable..    PANCREAS: Fatty infiltration of the pancreas.    ADRENAL GLANDS: The left adrenal gland is thickened. The right adrenal gland is unremarkable.    KIDNEYS: The left kidney is atrophic. There may be a right parapelvic cyst, limited in evaluation due to motion. There is no hydronephrosis. There are subcentimeter renal hypodensities, too small to characterize.    ABDOMINOPELVIC NODES: Unremarkable..    PELVIC ORGANS: The uterus and adnexa are incompletely evaluated on CT.    PERITONEUM/MESENTERY/BOWEL: There are punctate foci of free intraperitoneal air in the upper abdomen, for example in the right upper quadrant on image 185 of series 3 and in the left upper quadrant image 177 of series 3. There is no evidence of obstruction.    BONES/SOFT TISSUES: There is cortical irregularity to the coccyx on image 380 of series 3 consistent with fracture of indeterminate age. Age indeterminate compression deformity of L1. Clinical correlation is recommended. There are degenerative changes. There is a scoliosis. Evaluation of the ribs for traumatic injury is limited due to motion artifact; nondisplaced fractures are difficult to exclude. Thereis a large fat-containing right hip lesion measuring 10 cm, indeterminate.    OTHER: There are abdominal pelvic vascular calcifications. The infrarenal abdominal aorta measures up to 2.4 cm..      IMPRESSION:    Free intraperitoneal air in the upper abdomen suggestive of bowel perforation of unclear origin.    Age indeterminate compression deformity of L1 as well as age-indeterminate cortical irregularity to the coccyx. Findings are consistent with fracture of indeterminate age. Clinical correlation for point tenderness is recommended.    Distended gallbladder    Indeterminate fat-containing lesion within the right hip measuring 10 cm          Spoke with PILLO MOROCHO PA on 4/15/2021 3:41 PM with readback.              DERECK GIORDANO MD; Attending Radiologist  This document has been electronically signed. Apr 15 2021  3:49PM    < end of copied text >

## 2021-04-15 NOTE — H&P ADULT - HISTORY OF PRESENT ILLNESS
89 year old woman brought in by family for evaluation . patient was found on floor , confused as per grandson today. As per patient, she has been weak since receiving second moderna vaccine last week. Patient with decreased po intake and decreased urine output. patient denies any back pain, no SOB, no chest pain. Patient with hx of a.fib on xarelto, GERD on PPI. patient states she has been having black stools over past few weeks. Pt denies any falls, no LOC.

## 2021-04-16 LAB
ANION GAP SERPL CALC-SCNC: 15 MMOL/L — HIGH (ref 7–14)
APPEARANCE UR: CLEAR — SIGNIFICANT CHANGE UP
BACTERIA # UR AUTO: ABNORMAL
BASOPHILS # BLD AUTO: 0.01 K/UL — SIGNIFICANT CHANGE UP (ref 0–0.2)
BASOPHILS NFR BLD AUTO: 0.1 % — SIGNIFICANT CHANGE UP (ref 0–1)
BILIRUB UR-MCNC: ABNORMAL
BUN SERPL-MCNC: 73 MG/DL — CRITICAL HIGH (ref 10–20)
CALCIUM SERPL-MCNC: 7.9 MG/DL — LOW (ref 8.5–10.1)
CHLORIDE SERPL-SCNC: 112 MMOL/L — HIGH (ref 98–110)
CK SERPL-CCNC: 7402 U/L — HIGH (ref 0–225)
CO2 SERPL-SCNC: 15 MMOL/L — LOW (ref 17–32)
COLOR SPEC: YELLOW — SIGNIFICANT CHANGE UP
CREAT SERPL-MCNC: 2.4 MG/DL — HIGH (ref 0.7–1.5)
DIFF PNL FLD: ABNORMAL
EOSINOPHIL # BLD AUTO: 0 K/UL — SIGNIFICANT CHANGE UP (ref 0–0.7)
EOSINOPHIL NFR BLD AUTO: 0 % — SIGNIFICANT CHANGE UP (ref 0–8)
EPI CELLS # UR: ABNORMAL /HPF
GLUCOSE SERPL-MCNC: 79 MG/DL — SIGNIFICANT CHANGE UP (ref 70–99)
GLUCOSE UR QL: NEGATIVE MG/DL — SIGNIFICANT CHANGE UP
HCT VFR BLD CALC: 24.9 % — LOW (ref 37–47)
HCT VFR BLD CALC: 26.2 % — LOW (ref 37–47)
HCT VFR BLD CALC: 26.3 % — LOW (ref 37–47)
HCT VFR BLD CALC: 27.3 % — LOW (ref 37–47)
HGB BLD-MCNC: 8 G/DL — LOW (ref 12–16)
HGB BLD-MCNC: 8.4 G/DL — LOW (ref 12–16)
HGB BLD-MCNC: 8.4 G/DL — LOW (ref 12–16)
HGB BLD-MCNC: 8.7 G/DL — LOW (ref 12–16)
IMM GRANULOCYTES NFR BLD AUTO: 0.7 % — HIGH (ref 0.1–0.3)
IMM GRANULOCYTES NFR BLD AUTO: 1 % — HIGH (ref 0.1–0.3)
IMM GRANULOCYTES NFR BLD AUTO: 1.1 % — HIGH (ref 0.1–0.3)
KETONES UR-MCNC: 15
LACTATE SERPL-SCNC: 1.3 MMOL/L — SIGNIFICANT CHANGE UP (ref 0.7–2)
LEUKOCYTE ESTERASE UR-ACNC: NEGATIVE — SIGNIFICANT CHANGE UP
LYMPHOCYTES # BLD AUTO: 0.6 K/UL — LOW (ref 1.2–3.4)
LYMPHOCYTES # BLD AUTO: 0.85 K/UL — LOW (ref 1.2–3.4)
LYMPHOCYTES # BLD AUTO: 0.87 K/UL — LOW (ref 1.2–3.4)
LYMPHOCYTES # BLD AUTO: 3.6 % — LOW (ref 20.5–51.1)
LYMPHOCYTES # BLD AUTO: 4.5 % — LOW (ref 20.5–51.1)
LYMPHOCYTES # BLD AUTO: 5.5 % — LOW (ref 20.5–51.1)
MCHC RBC-ENTMCNC: 31.8 PG — HIGH (ref 27–31)
MCHC RBC-ENTMCNC: 31.9 G/DL — LOW (ref 32–37)
MCHC RBC-ENTMCNC: 31.9 G/DL — LOW (ref 32–37)
MCHC RBC-ENTMCNC: 32.1 G/DL — SIGNIFICANT CHANGE UP (ref 32–37)
MCHC RBC-ENTMCNC: 32.1 G/DL — SIGNIFICANT CHANGE UP (ref 32–37)
MCHC RBC-ENTMCNC: 32.4 PG — HIGH (ref 27–31)
MCHC RBC-ENTMCNC: 32.5 PG — HIGH (ref 27–31)
MCHC RBC-ENTMCNC: 32.6 PG — HIGH (ref 27–31)
MCV RBC AUTO: 101.2 FL — HIGH (ref 81–99)
MCV RBC AUTO: 101.5 FL — HIGH (ref 81–99)
MCV RBC AUTO: 102.2 FL — HIGH (ref 81–99)
MCV RBC AUTO: 99.2 FL — HIGH (ref 81–99)
MONOCYTES # BLD AUTO: 1.18 K/UL — HIGH (ref 0.1–0.6)
MONOCYTES # BLD AUTO: 1.21 K/UL — HIGH (ref 0.1–0.6)
MONOCYTES # BLD AUTO: 1.36 K/UL — HIGH (ref 0.1–0.6)
MONOCYTES NFR BLD AUTO: 7.2 % — SIGNIFICANT CHANGE UP (ref 1.7–9.3)
MONOCYTES NFR BLD AUTO: 7.2 % — SIGNIFICANT CHANGE UP (ref 1.7–9.3)
MONOCYTES NFR BLD AUTO: 7.4 % — SIGNIFICANT CHANGE UP (ref 1.7–9.3)
NEUTROPHILS # BLD AUTO: 13.67 K/UL — HIGH (ref 1.4–6.5)
NEUTROPHILS # BLD AUTO: 14.86 K/UL — HIGH (ref 1.4–6.5)
NEUTROPHILS # BLD AUTO: 16.36 K/UL — HIGH (ref 1.4–6.5)
NEUTROPHILS NFR BLD AUTO: 86.3 % — HIGH (ref 42.2–75.2)
NEUTROPHILS NFR BLD AUTO: 87.2 % — HIGH (ref 42.2–75.2)
NEUTROPHILS NFR BLD AUTO: 88 % — HIGH (ref 42.2–75.2)
NITRITE UR-MCNC: NEGATIVE — SIGNIFICANT CHANGE UP
NRBC # BLD: 0 /100 WBCS — SIGNIFICANT CHANGE UP (ref 0–0)
PH UR: 6 — SIGNIFICANT CHANGE UP (ref 5–8)
PLATELET # BLD AUTO: 207 K/UL — SIGNIFICANT CHANGE UP (ref 130–400)
PLATELET # BLD AUTO: 219 K/UL — SIGNIFICANT CHANGE UP (ref 130–400)
PLATELET # BLD AUTO: 227 K/UL — SIGNIFICANT CHANGE UP (ref 130–400)
PLATELET # BLD AUTO: 237 K/UL — SIGNIFICANT CHANGE UP (ref 130–400)
POTASSIUM SERPL-MCNC: 4.3 MMOL/L — SIGNIFICANT CHANGE UP (ref 3.5–5)
POTASSIUM SERPL-SCNC: 4.3 MMOL/L — SIGNIFICANT CHANGE UP (ref 3.5–5)
PROT UR-MCNC: >=300 MG/DL
RBC # BLD: 2.46 M/UL — LOW (ref 4.2–5.4)
RBC # BLD: 2.59 M/UL — LOW (ref 4.2–5.4)
RBC # BLD: 2.64 M/UL — LOW (ref 4.2–5.4)
RBC # BLD: 2.67 M/UL — LOW (ref 4.2–5.4)
RBC # FLD: 18.2 % — HIGH (ref 11.5–14.5)
RBC # FLD: 18.6 % — HIGH (ref 11.5–14.5)
RBC # FLD: 18.7 % — HIGH (ref 11.5–14.5)
RBC # FLD: 18.8 % — HIGH (ref 11.5–14.5)
RBC CASTS # UR COMP ASSIST: ABNORMAL /HPF
SODIUM SERPL-SCNC: 142 MMOL/L — SIGNIFICANT CHANGE UP (ref 135–146)
SP GR SPEC: 1.02 — SIGNIFICANT CHANGE UP (ref 1.01–1.03)
TROPONIN T SERPL-MCNC: 0.06 NG/ML — CRITICAL HIGH
TROPONIN T SERPL-MCNC: 0.07 NG/ML — CRITICAL HIGH
UROBILINOGEN FLD QL: 1 MG/DL (ref 0.2–0.2)
WBC # BLD: 15.84 K/UL — HIGH (ref 4.8–10.8)
WBC # BLD: 16.87 K/UL — HIGH (ref 4.8–10.8)
WBC # BLD: 17.67 K/UL — HIGH (ref 4.8–10.8)
WBC # BLD: 18.76 K/UL — HIGH (ref 4.8–10.8)
WBC # FLD AUTO: 15.84 K/UL — HIGH (ref 4.8–10.8)
WBC # FLD AUTO: 16.87 K/UL — HIGH (ref 4.8–10.8)
WBC # FLD AUTO: 17.67 K/UL — HIGH (ref 4.8–10.8)
WBC # FLD AUTO: 18.76 K/UL — HIGH (ref 4.8–10.8)
WBC UR QL: SIGNIFICANT CHANGE UP /HPF

## 2021-04-16 PROCEDURE — 99232 SBSQ HOSP IP/OBS MODERATE 35: CPT

## 2021-04-16 PROCEDURE — 99233 SBSQ HOSP IP/OBS HIGH 50: CPT

## 2021-04-16 RX ORDER — CEFTRIAXONE 500 MG/1
2000 INJECTION, POWDER, FOR SOLUTION INTRAMUSCULAR; INTRAVENOUS EVERY 24 HOURS
Refills: 0 | Status: DISCONTINUED | OUTPATIENT
Start: 2021-04-16 | End: 2021-04-22

## 2021-04-16 RX ORDER — METOPROLOL TARTRATE 50 MG
5 TABLET ORAL ONCE
Refills: 0 | Status: COMPLETED | OUTPATIENT
Start: 2021-04-16 | End: 2021-04-16

## 2021-04-16 RX ORDER — METOPROLOL TARTRATE 50 MG
50 TABLET ORAL
Refills: 0 | Status: DISCONTINUED | OUTPATIENT
Start: 2021-04-16 | End: 2021-04-17

## 2021-04-16 RX ORDER — METRONIDAZOLE 500 MG
500 TABLET ORAL EVERY 8 HOURS
Refills: 0 | Status: DISCONTINUED | OUTPATIENT
Start: 2021-04-16 | End: 2021-04-22

## 2021-04-16 RX ORDER — SODIUM CHLORIDE 9 MG/ML
1000 INJECTION, SOLUTION INTRAVENOUS
Refills: 0 | Status: DISCONTINUED | OUTPATIENT
Start: 2021-04-16 | End: 2021-04-17

## 2021-04-16 RX ADMIN — Medication 100 MILLIGRAM(S): at 13:31

## 2021-04-16 RX ADMIN — Medication 100 MILLIGRAM(S): at 21:28

## 2021-04-16 RX ADMIN — CHLORHEXIDINE GLUCONATE 1 APPLICATION(S): 213 SOLUTION TOPICAL at 12:44

## 2021-04-16 RX ADMIN — Medication 0.5 MILLIGRAM(S): at 21:28

## 2021-04-16 RX ADMIN — MEROPENEM 100 MILLIGRAM(S): 1 INJECTION INTRAVENOUS at 05:56

## 2021-04-16 RX ADMIN — CEFTRIAXONE 100 MILLIGRAM(S): 500 INJECTION, POWDER, FOR SOLUTION INTRAMUSCULAR; INTRAVENOUS at 12:40

## 2021-04-16 RX ADMIN — Medication 5 MILLIGRAM(S): at 12:40

## 2021-04-16 NOTE — CONSULT NOTE ADULT - ASSESSMENT
Patient with above history. She does take  several time a week multiple Advil. Patient with gi bleed and free air in abdomen. Need hold AC. DVT prophylaxis. GI f/u. Surgical F/U, No advil Transfuse prn. Prognosis guarded

## 2021-04-16 NOTE — CONSULT NOTE ADULT - SUBJECTIVE AND OBJECTIVE BOX
CARDIOLOGY CONSULT NOTE     CHIEF COMPLAINT/REASON FOR CONSULT:    HPI:  89 year old woman brought in by family for evaluation . patient was found on floor , confused as per grandson today. As per patient, she has been weak since receiving second moderna vaccine last week. Patient with decreased po intake and decreased urine output. patient denies any back pain, no SOB, no chest pain. Patient with hx of a.fib on xarelto, GERD on PPI. patient states she has been having black stools over past few weeks. Pt denies any falls, no LOC.    (15 Apr 2021 18:15)      PAST MEDICAL & SURGICAL HISTORY:  Atrial fibrillation    GERD (gastroesophageal reflux disease)    HTN (hypertension)    No significant past surgical history        Cardiac Risks:   [x ]HTN, [ ] DM, [ ] Smoking, [ ] FH,  [ ] Lipids        MEDICATIONS:  MEDICATIONS  (STANDING):  atorvastatin 40 milliGRAM(s) Oral at bedtime  chlorhexidine 4% Liquid 1 Application(s) Topical <User Schedule>  meropenem  IVPB 500 milliGRAM(s) IV Intermittent every 12 hours  metoprolol tartrate 75 milliGRAM(s) Oral daily  pantoprazole Infusion 8 mG/Hr (10 mL/Hr) IV Continuous <Continuous>  sodium chloride 0.9%. 1000 milliLiter(s) (75 mL/Hr) IV Continuous <Continuous>      FAMILY HISTORY:  No pertinent family history in first degree relatives        SOCIAL HISTORY:      [ ] Marital status    Allergies    No Known Allergies        	    REVIEW OF SYSTEMS:  CONSTITUTIONAL: No fever, weight loss, or fatigue  EYES: No eye pain, visual disturbances, or discharge  ENMT:  No difficulty hearing, tinnitus, vertigo; No sinus or throat pain  NECK: No pain or stiffness  RESPIRATORY: No cough, wheezing, chills or hemoptysis; No Shortness of Breath  CARDIOVASCULAR: No chest pain, palpitations, passing out, dizziness, or leg swelling  GASTROINTESTINAL: No abdominal or epigastric pain. No nausea, vomiting, or hematemesis; No diarrhea or constipation. No melena or hematochezia.  GENITOURINARY: No dysuria, frequency, hematuria, or incontinence  NEUROLOGICAL: see above  SKIN: No itching, burning, rashes, or lesions   	      PHYSICAL EXAM:  T(C): 36.6 (04-16-21 @ 03:00), Max: 36.6 (04-16-21 @ 03:00)  HR: 107 (04-16-21 @ 07:15) (93 - 122)  BP: 166/70 (04-16-21 @ 07:15) (104/67 - 168/81)  RR: 18 (04-16-21 @ 07:15) (18 - 24)  SpO2: 100% (04-16-21 @ 07:15) (94% - 100%)  Wt(kg): --  I&O's Summary    15 Apr 2021 07:01  -  16 Apr 2021 07:00  --------------------------------------------------------  IN: 985 mL / OUT: 360 mL / NET: 625 mL    16 Apr 2021 07:01  -  16 Apr 2021 07:48  --------------------------------------------------------  IN: 85 mL / OUT: 0 mL / NET: 85 mL        Appearance: Normal	  Psychiatry: A & O x 3, Mood & affect appropriate  HEENT:   Normal oral mucosa, PERRL, EOMI	  Lymphatic: No lymphadenopathy  Cardiovascular: Normal S1 S2,irreg No JVD, No murmurs  Respiratory: Lungs clear to auscultation	  Gastrointestinal:  Soft, Non-tender, + BS	  Skin: No rashes, No ecchymoses, No cyanosis	  Neurologic: Non-focal  Extremities: Normal range of motion, No clubbing, cyanosis or edema  Vascular: Peripheral pulses palpable 2+ bilaterally      ECG: afib ns st  	    	  LABS:	 	    CARDIAC MARKERS:                                    8.4    17.67 )-----------( 237      ( 16 Apr 2021 05:36 )             26.3     04-15    142  |  114<H>  |  73<HH>  ----------------------------<  90  4.9   |  13<L>  |  2.4<H>    Ca    8.2<L>      15 Apr 2021 22:02  Mg     2.7     04-15    TPro  5.2<L>  /  Alb  3.0<L>  /  TBili  1.7<H>  /  DBili  x   /  AST  151<H>  /  ALT  46<H>  /  AlkPhos  67  04-15    PT/INR - ( 15 Apr 2021 12:53 )   PT: 29.50 sec;   INR: 2.57 ratio         PTT - ( 15 Apr 2021 12:53 )  PTT:27.2 sec

## 2021-04-16 NOTE — PROGRESS NOTE ADULT - SUBJECTIVE AND OBJECTIVE BOX
SUBJECTIVE:    Patient is a 89 y/o Female who presents after being found unresponsiveness on floor.     Currently admitted to medicine with the primary diagnosis of GI bleed and rhabdomyolysis.      Today is hospital day 1. Patient was seen and examined at bedside. This morning she is resting in bed. TYRESE improving.  s/p 1 unit PRBC.      PAST MEDICAL & SURGICAL HISTORY  PAST MEDICAL & SURGICAL HISTORY:  Atrial fibrillation    GERD (gastroesophageal reflux disease)    HTN (hypertension)    No significant past surgical history      SOCIAL HISTORY:  Lives alone  NO smoking   No etoh    ALLERGIES:  No Known Allergies    MEDICATIONS:  STANDING MEDICATIONS  cefTRIAXone   IVPB 2000 milliGRAM(s) IV Intermittent every 24 hours  chlorhexidine 4% Liquid 1 Application(s) Topical <User Schedule>  dextrose 5% 1000 milliLiter(s) IV Continuous <Continuous>  metoprolol tartrate 50 milliGRAM(s) Oral two times a day  metoprolol tartrate Injectable 5 milliGRAM(s) IV Push once  metroNIDAZOLE  IVPB 500 milliGRAM(s) IV Intermittent every 8 hours  pantoprazole Infusion 8 mG/Hr IV Continuous <Continuous>    PRN MEDICATIONS  ALPRAZolam 0.25 milliGRAM(s) Oral daily PRN    VITALS:   T(F): 96.9  HR: 107  BP: 166/70  RR: 25  SpO2: 100%    LABS:                        8.0    16.87 )-----------( 227      ( 2021 11:17 )             24.9     04-16    142  |  112<H>  |  73<HH>  ----------------------------<  79  4.3   |  15<L>  |  2.4<H>    Ca    7.9<L>      2021 05:36  Mg     2.7     04-15    TPro  5.2<L>  /  Alb  3.0<L>  /  TBili  1.7<H>  /  DBili  x   /  AST  151<H>  /  ALT  46<H>  /  AlkPhos  67  04-15    PT/INR - ( 15 Apr 2021 12:53 )   PT: 29.50 sec;   INR: 2.57 ratio         PTT - ( 15 Apr 2021 12:53 )  PTT:27.2 sec  Urinalysis Basic - ( 2021 11:00 )    Color: Yellow / Appearance: Clear / S.020 / pH: x  Gluc: x / Ketone: 15  / Bili: Small / Urobili: 1.0 mg/dL   Blood: x / Protein: >=300 mg/dL / Nitrite: Negative   Leuk Esterase: Negative / RBC: 3-5 /HPF / WBC 1-2 /HPF   Sq Epi: x / Non Sq Epi: Occasional /HPF / Bacteria: Moderate        Troponin T, Serum: 0.06 ng/mL *HH* (21 @ 11:17)  Creatine Kinase, Serum: 7402 U/L *H* (21 @ 05:36)  Lactate, Blood: 1.3 mmol/L (21 @ 05:36)  Troponin T, Serum: 0.07 ng/mL *HH* (21 @ 05:36)  Lactate, Blood: 3.1 mmol/L *H* (04-15-21 @ 22:02)  Creatine Kinase, Serum: 7127 U/L *H* (04-15-21 @ 22:02)  Troponin T, Serum: 0.09 ng/mL *HH* (04-15-21 @ 12:53)  Creatine Kinase, Serum: 6376 U/L *H* (04-15-21 @ 12:53)      CARDIAC MARKERS ( 2021 11:17 )  x     / 0.06 ng/mL / x     / x     / x      CARDIAC MARKERS ( 2021 05:36 )  x     / 0.07 ng/mL / 7402 U/L / x     / x      CARDIAC MARKERS ( 15 Apr 2021 22:02 )  x     / x     / 7127 U/L / x     / x      CARDIAC MARKERS ( 15 Apr 2021 12:53 )  x     / 0.09 ng/mL / 6376 U/L / x     / x          RADIOLOGY:  < from: Xray Chest 1 View- PORTABLE-Urgent (Xray Chest 1 View- PORTABLE-Urgent .) (04.15.21 @ 19:27) >    Impression:    No radiographic evidence of acute cardiopulmonary disease.        < end of copied text >  < from: Xray Femur 2 Views, Right (04.15.21 @ 17:24) >    Findings/  impression:    No acute displaced fracture or dislocation.    Degenerative change.    Vascular calcifications.        < end of copied text >  < from: CT Chest No Cont (04.15.21 @ 14:43) >    IMPRESSION:    Free intraperitoneal air in the upper abdomen suggestive of bowel perforation of unclear origin.    Age indeterminate compression deformity of L1 as well as age-indeterminate cortical irregularity to the coccyx. Findings are consistent with fracture of indeterminate age. Clinical correlation for point tenderness is recommended.    Distended gallbladder    Indeterminate fat-containing lesion within the right hip measuring 10 cm          < end of copied text >  < from: CT Cervical Spine No Cont (04.15.21 @ 13:51) >    IMPRESSION:    CTHEAD:  No evidence of acute intracranial pathology. No evidence of calvarial fracture, intracranial hemorrhage mass effect or midline shift.    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          < end of copied text >  < from: CT Head No Cont (04.15.21 @ 13:51) >    IMPRESSION:    CTHEAD:  No evidence of acute intracranial pathology. No evidence of calvarial fracture, intracranial hemorrhage mass effect or midline shift.    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        < end of copied text >      PHYSICAL EXAM:  GENERAL: NAD, speaks in full sentences, no signs of respiratory distress  HEAD: Atraumatic  NECK: Supple  CHEST/LUNG: Clear to auscultation bilaterally; No wheeze or crackles  HEART: S1, S2; RRR; No murmurs, rubs, or gallops  ABDOMEN: BS+; Soft, Non-tender, Non-distended  EXTREMITIES:  2+ Peripheral Pulses, No clubbing, cyanosis, or edema  PSYCH: AAOx3  NEUROLOGY: non-focal  SKIN: No rashes or lesions

## 2021-04-16 NOTE — PROGRESS NOTE ADULT - ASSESSMENT
89yFemale pmh A-Fib on Eliquis last dose yesterday, GERD, HTN presents for fall. Patient reports intermittent dark brown stools and GI was called for her anemia.    Problem 1-Anemia with intermittent dark stools as per patient  ddx PUD, malignancy, retroperitoneal hemorrhage, diverticulosis  -rectal exam without evidence for active GI bleeding   Free intraperitoneal air in the upper abdomen suggestive of bowel perforation of unclear origin.  Rec  ADDENDUM Called by ER patient with likely perforated duodenal ulcer on CT scan  -monitor patient, diet resumption as per surgery  -EGD in one-two months after healing of perforation   -NPO  -Given elevated troponin and CK patient will need cardiology evaluation and risk stratification prior to any intervention  -ECHO  -sepsis work-up  -Maintain Hemodynamic Stability   -Monitor CBC  -CMP,Optimize Electrolytes  -Transfuse prn to hgb >8  -Two large bore IV lines  -Continue PPI drip  -Monitor Vital Signs  -Monitor Stool For blood, frequency, consistency, melena  -Active Type and Screen  -Iron Studies, Folate, Vitamin B12 levels     Problem 2-Altered liver chemistries   -distended gallbladder  ddx cholestasis of sepsis, rhabdo , infectious etiology  Rec  -RUQ ultrasound  -treat sepsis  -surgery on board  -If LFTs fail to improve Check Hepatitis B Sag, Hep B SAB, Hep A Ab, Hep C Ab,Smooth Muscle Antibody, Anti LK M1 antibody, AMA, KEVIN, Ceruloplasmin, Ferritin, Alpha-1-antitrypsin, CMV, Herpes serologies, EBV serologies,     Problem 3-Age indeterminate compression deformity of L1 as well as age-indeterminate cortical irregularity to the coccyx. Findings are consistent with fracture of indeterminate age. Clinical correlation for point tenderness is recommended.  Rec  - Care as per primary team     Problem 4-Indeterminate fat-containing lesion within the right hip measuring 10 cm  Rec  - Care as per primary team     89yFemale pmh A-Fib on Eliquis last dose yesterday, GERD, HTN presents for fall. Patient reports intermittent dark brown stools and GI was called for her anemia.    Problem 1-Anemia with intermittent dark stools as per patient  ddx PUD, malignancy, retroperitoneal hemorrhage, diverticulosis  -rectal exam without evidence for active GI bleeding   Free intraperitoneal air in the upper abdomen suggestive of bowel perforation of unclear origin.  Rec  -monitor patient, diet resumption as per surgery  -EGD in one-two months after healing of perforation   -NPO  -Given elevated troponin and CK patient will need cardiology evaluation and risk stratification prior to any outpatient intervention  -sepsis work-up  -Maintain Hemodynamic Stability   -Monitor CBC  -CMP,Optimize Electrolytes  -Transfuse prn to hgb >8  -Two large bore IV lines  -Continue PPI drip  -Monitor Vital Signs  -Monitor Stool For blood, frequency, consistency, melena  -Active Type and Screen    Problem 2-Altered liver chemistries   -distended gallbladder  ddx cholestasis of sepsis, rhabdo , infectious etiology  Rec  -RUQ ultrasound  -treat sepsis  -surgery on board  -If LFTs fail to improve Check Hepatitis B Sag, Hep B SAB, Hep A Ab, Hep C Ab,Smooth Muscle Antibody, Anti LK M1 antibody, AMA, KEVIN, Ceruloplasmin, Ferritin, Alpha-1-antitrypsin, CMV, Herpes serologies, EBV serologies,     Problem 3-Age indeterminate compression deformity of L1 as well as age-indeterminate cortical irregularity to the coccyx. Findings are consistent with fracture of indeterminate age. Clinical correlation for point tenderness is recommended.  Rec  - Care as per primary team     Problem 4-Indeterminate fat-containing lesion within the right hip measuring 10 cm  Rec  - Care as per primary team

## 2021-04-16 NOTE — PROGRESS NOTE ADULT - SUBJECTIVE AND OBJECTIVE BOX
RAVICECILLE  89y  Female      Patient is a 89y old  Female who presents with a chief complaint of Anemia , Afib, tyrese, acidosis (16 Apr 2021 07:48)    received moderna covid vaccine 2 nd one on 4/9,s/p fall    REVIEW OF SYSTEMS:  CONSTITUTIONAL: No fever, weight loss, or fatigue  EYES: No eye pain, visual disturbances, or discharge  ENMT:  No difficulty hearing, tinnitus, vertigo; No sinus or throat pain  NECK: No pain or stiffness  BREASTS: No pain, masses, or nipple discharge  RESPIRATORY: No cough, wheezing, chills or hemoptysis; No shortness of breath  CARDIOVASCULAR: No chest pain, palpitations, dizziness, or leg swelling  GASTROINTESTINAL: No abdominal or epigastric pain. No nausea, vomiting, or hematemesis; No diarrhea or constipation.dark stool+  GENITOURINARY: No dysuria, frequency, hematuria, or incontinence  NEUROLOGICAL: No headaches, memory loss, loss of strength, numbness, or tremors  SKIN: No itching, burning, rashes, or lesions   LYMPH NODES: No enlarged glands  MUSCULOSKELETAL: No joint pain or swelling; No muscle, back, or extremity pain  PSYCHIATRIC: No depression, anxiety, mood swings, or difficulty sleeping  HEME/LYMPH: No easy bruising, or bleeding gums  ALLERY AND IMMUNOLOGIC: No hives or eczema  FAMILY HISTORY:  No pertinent family history in first degree relatives      T(C): 36.1 (04-16-21 @ 07:10), Max: 36.6 (04-16-21 @ 03:00)  HR: 107 (04-16-21 @ 07:15) (93 - 122)  BP: 166/70 (04-16-21 @ 07:15) (104/67 - 168/81)  RR: 18 (04-16-21 @ 07:15) (18 - 24)  SpO2: 100% (04-16-21 @ 07:15) (94% - 100%)  Wt(kg): --Vital Signs Last 24 Hrs  T(C): 36.1 (16 Apr 2021 07:10), Max: 36.6 (16 Apr 2021 03:00)  T(F): 97 (16 Apr 2021 07:10), Max: 97.9 (16 Apr 2021 03:00)  HR: 107 (16 Apr 2021 07:15) (93 - 122)  BP: 166/70 (16 Apr 2021 07:15) (104/67 - 168/81)  BP(mean): 101 (16 Apr 2021 07:15) (81 - 116)  RR: 18 (16 Apr 2021 07:15) (18 - 24)  SpO2: 100% (16 Apr 2021 07:15) (94% - 100%)  No Known Allergies      PHYSICAL EXAM:  GENERAL: NAD,   HEAD:  Atraumatic, Normocephalic  EYES: EOMI, PERRLA, conjunctiva and sclera clear  ENMT: No tonsillar erythema, exudates, or enlargement;   NECK: Supple, No JVD, Normal thyroid  NERVOUS SYSTEM:  Alert & Oriented X3,   CHEST/LUNG: Clear to percussion bilaterally; No rales, rhonchi, wheezing, or rubs  HEART: irregularly irregular  ABDOMEN: Soft, Nontender, Nondistended; Bowel sounds present  EXTREMITIES:  , No clubbing, cyanosis, or edema  LYMPH: No lymphadenopathy noted  SKIN: No rashes or lesions      LABS:  04-15    142  |  114<H>  |  73<HH>  ----------------------------<  90  4.9   |  13<L>  |  2.4<H>    Ca    8.2<L>      15 Apr 2021 22:02  Mg     2.7     04-15    TPro  5.2<L>  /  Alb  3.0<L>  /  TBili  1.7<H>  /  DBili  x   /  AST  151<H>  /  ALT  46<H>  /  AlkPhos  67  04-15                          8.4    17.67 )-----------( 237      ( 16 Apr 2021 05:36 )             26.3         < from: CT Abdomen and Pelvis No Cont (04.15.21 @ 14:43) >  Free intraperitoneal air in the upper abdomen suggestive of bowel perforation of unclear origin.    Age indeterminate compression deformity of L1 as well as age-indeterminate cortical irregularity to the coccyx. Findings are consistent with fracture of indeterminate age. Clinical correlation for point tenderness is recommended.    Distended gallbladder    Indeterminate fat-containing lesion within the right hip measuring 10 cm      < end of copied text >  < from: CT Cervical Spine No Cont (04.15.21 @ 13:51) >  CTHEAD:  No evidence of acute intracranial pathology. No evidence of calvarial fracture, intracranial hemorrhage mass effect or midline shift.    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 No evidence of calvarial fracture, intracranial hemorrhage mass effect or midline shift.    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    < end of copied text >  RADIOLOGY & ADDITIONAL TESTS:    MEDICATION:  ALPRAZolam 0.25 milliGRAM(s) Oral daily PRN  atorvastatin 40 milliGRAM(s) Oral at bedtime  chlorhexidine 4% Liquid 1 Application(s) Topical <User Schedule>  meropenem  IVPB 500 milliGRAM(s) IV Intermittent every 12 hours  metoprolol tartrate 75 milliGRAM(s) Oral daily  pantoprazole Infusion 8 mG/Hr IV Continuous <Continuous>  sodium chloride 0.9%. 1000 milliLiter(s) IV Continuous <Continuous>      HEALTH ISSUES - PROBLEM Dx:    anemia acute probably blood loss s/p 2 units prbc,xarelto on hold  TYRESE probably from gi bleeding iv hydration  perforated bowel small NPO,surgery note appreciated,id too on meropenem  A fib on metoprolol,xarelto on hold cardiology note appreciated  gi bleeding gerd ?ulcer gi consult,protonix ivp

## 2021-04-16 NOTE — PROGRESS NOTE ADULT - ASSESSMENT
89 F with PMHx of [place] on Xarelto, GERD (diagnosed >5 years ago with no EGD/colonoscopy) on PPI, with intermittent dark stools, found down with CT findings of punctate foci of free intraperitoneal air in the upper abdomen    -no physical exam findings to suggest acute surgical abdomen   -no indication for surgical intervention at this time   - NGT D/C'd by surgery team today; recommend to keep NPO for now  - cont IV ceftriaxone & flagyl and trend WBC's  - Per GI today: rectal exam is neg for active GI bleed; outpt EGD 1-2 mnths upon healing of perforation; NPO (diet as per Sx); continue PPI drip; cont IV abx   - will continue to follow

## 2021-04-16 NOTE — PROGRESS NOTE ADULT - ASSESSMENT
IMPRESSION:    GI bleed s/p 1 unit of PRBCs  Possible microperforation  Rhabdomyolysis  Acute kidney injury  H/o of AFib on xarelto.  PLAN:    CNS: avoid depressants    HEENT:  Oral care    PULMONARY:  HOB @ 45 degrees    CARDIOVASCULAR: Continue IV fluids. apply Cheetah, 500cc NS.    GI: GI prophylaxis  , protonix drip for now, GI f/u.  F/u surgery                                        Feeding  NPO     RENAL:  F/u  lytes.  Correct as needed. accurate I/O    INFECTIOUS DISEASE:  continue rocephin/flagyl. F/u cultures.    HEMATOLOGICAL:  DVT prophylaxis. hold xarelto, IPCs for now    ENDOCRINE:  Follow up FS.  Insulin protocol if needed.    MUSCULOSKELETAL: bed rest     CODE STATUS: FULL CODE    DISPOSITION: Pt requires continued monitoring in the MICU     IMPRESSION:  hypoglycemia  GI bleed s/p 1 unit of PRBCs  Possible microperforation  sepsis  hypotension  Rhabdomyolysis  Acute kidney injury  H/o of AFib on xarelto.      PLAN:    CNS: avoid depressants    HEENT:  Oral care    PULMONARY:  HOB @ 45 degrees    CARDIOVASCULAR: Continue IV fluids.   apply Cheetah,   500cc NS.    GI: GI prophylaxis  ,   protonix drip,   GI f/u.  F/u surgery                                           NPO     RENAL:  F/u  lytes.   Correct as needed.   accurate I/O    INFECTIOUS DISEASE:  continue rocephin/flagyl.   F/u cultures.    HEMATOLOGICAL:  DVT prophylaxis. hold xarelto, IPCs for now    ENDOCRINE:  Follow up FS.  Insulin protocol if needed.    MUSCULOSKELETAL: bed rest     CODE STATUS: FULL CODE    DISPOSITION: Pt requires continued monitoring in the MICU

## 2021-04-16 NOTE — CONSULT NOTE ADULT - SUBJECTIVE AND OBJECTIVE BOX
RAVICECILLE  89y, Female  Allergy: No Known Allergies      CHIEF COMPLAINT: Anemia , Afib, leeann, acidosis (15 Apr 2021 18:15)      LOS  1d    HPI:  89 year old woman brought in by family for evaluation . patient was found on floor , confused as per grandson today. As per patient, she has been weak since receiving second moderna vaccine last week. Patient with decreased po intake and decreased urine output. patient denies any back pain, no SOB, no chest pain. Patient with hx of a.fib on xarelto, GERD on PPI. patient states she has been having black stools over past few weeks. Pt denies any falls, no LOC.    (15 Apr 2021 18:15)      INFECTIOUS DISEASE HISTORY:  History as above.   WBC to 22 on admission, H/H 6.5/21.3  Found to have air under diaphragm. Evaluated by surgery.   Afebrile on admission     PAST MEDICAL & SURGICAL HISTORY:  Atrial fibrillation    GERD (gastroesophageal reflux disease)    HTN (hypertension)    No significant past surgical history        FAMILY HISTORY  No pertinent family history in first degree relatives        SOCIAL HISTORY  Social History:  Lives alone  NO smoking   No etoh (15 Apr 2021 18:15)        ROS  General: Denies rigors, nightsweats  HEENT: Denies headache, rhinorrhea, sore throat, eye pain  CV: Denies CP, palpitations  PULM: Denies wheezing, hemoptysis  GI: Denies hematemesis, hematochezia, melena  : Denies discharge, hematuria  MSK: Denies arthralgias, myalgias  SKIN: Denies rash, lesions  NEURO: Denies paresthesias, weakness  PSYCH: Denies depression, anxiety    VITALS:  T(F): 97.9, Max: 97.9 (04-16-21 @ 03:00)  HR: 104  BP: 168/81  RR: 20Vital Signs Last 24 Hrs  T(C): 36.6 (16 Apr 2021 03:00), Max: 36.6 (16 Apr 2021 03:00)  T(F): 97.9 (16 Apr 2021 03:00), Max: 97.9 (16 Apr 2021 03:00)  HR: 104 (16 Apr 2021 05:36) (93 - 122)  BP: 168/81 (16 Apr 2021 05:36) (104/67 - 168/81)  BP(mean): 116 (16 Apr 2021 05:36) (81 - 116)  RR: 20 (16 Apr 2021 05:36) (18 - 24)  SpO2: 100% (16 Apr 2021 05:36) (94% - 100%)    PHYSICAL EXAM:  Gen: NAD, resting in bed  HEENT: Normocephalic, atraumatic  Neck: supple, no lymphadenopathy  CV: Regular rate & regular rhythm  Lungs: decreased BS at bases, no fremitus  Abdomen: Soft, BS present  Ext: Warm, well perfused  Neuro: non focal, awake  Skin: no rash, no erythema  Lines: no phlebitis    TESTS & MEASUREMENTS:                        8.4    17.67 )-----------( 237      ( 16 Apr 2021 05:36 )             26.3     04-15    142  |  114<H>  |  73<HH>  ----------------------------<  90  4.9   |  13<L>  |  2.4<H>    Ca    8.2<L>      15 Apr 2021 22:02  Mg     2.7     04-15    TPro  5.2<L>  /  Alb  3.0<L>  /  TBili  1.7<H>  /  DBili  x   /  AST  151<H>  /  ALT  46<H>  /  AlkPhos  67  04-15    eGFR if African American: 20 mL/min/1.73M2 (04-15-21 @ 22:02)  eGFR if Non African American: 17 mL/min/1.73M2 (04-15-21 @ 22:02)  eGFR if Non African American: 16 mL/min/1.73M2 (04-15-21 @ 12:53)  eGFR if African American: 18 mL/min/1.73M2 (04-15-21 @ 12:53)    LIVER FUNCTIONS - ( 15 Apr 2021 22:02 )  Alb: 3.0 g/dL / Pro: 5.2 g/dL / ALK PHOS: 67 U/L / ALT: 46 U/L / AST: 151 U/L / GGT: x                 Lactate, Blood: 1.3 mmol/L (04-16-21 @ 05:36)  Lactate, Blood: 3.1 mmol/L (04-15-21 @ 22:02)  Blood Gas Venous - Lactate: 2.8 mmoL/L (04-15-21 @ 18:25)  Blood Gas Venous - Lactate: 9.2 mmoL/L (04-15-21 @ 13:16)      INFECTIOUS DISEASES TESTING      RADIOLOGY & ADDITIONAL TESTS:  I have personally reviewed the last Chest xray  CXR      CT  CT Abdomen and Pelvis No Cont:   EXAM:  CT CHEST        EXAM:  CT ABDOMEN AND PELVIS            PROCEDURE DATE:  04/15/2021            INTERPRETATION:  CLINICAL STATEMENT: Status post fall    TECHNIQUE: Contiguous axial CT images were obtained from the chest to the pubic symphysiswithout intravenous contrast.  Oral contrast was not given.  Reformatted images in the coronal and sagittal planes were acquired. Additional MIP images were obtained.    COMPARISON CT: None.    Study is severely limited in evaluation due to motion artifact.      FINDINGS:    CHEST: There is a trace nonspecific pericardial effusion. The thyroid gland is present with the right thyroid extending into the superior mediastinum. The thyroid gland is incompletely evaluated on CT. There is no definite evidence of thoracic adenopathy. The heart size is enlarged. There is atherosclerosis. The thoracic aorta is normal in caliber. The central tracheobronchial tree is patent. There is no consolidation, pneumothorax or pleural effusion. There are patchy areas of subsegmental atelectasis.    HEPATOBILIARY: The gallbladder is distended.    SPLEEN: Unremarkable..    PANCREAS: Fatty infiltration of the pancreas.    ADRENAL GLANDS: The left adrenal gland is thickened. The right adrenal gland is unremarkable.    KIDNEYS: The left kidney is atrophic. There may be a right parapelvic cyst, limited in evaluation due to motion. There is no hydronephrosis. There are subcentimeter renal hypodensities, too small to characterize.    ABDOMINOPELVIC NODES: Unremarkable..    PELVIC ORGANS: The uterus and adnexa are incompletely evaluated on CT.    PERITONEUM/MESENTERY/BOWEL: There are punctate foci of free intraperitoneal air in the upper abdomen, for example in the right upper quadrant on image 185 of series 3 and in the left upper quadrant image 177 of series 3. There is no evidence of obstruction.    BONES/SOFT TISSUES: There is cortical irregularity to the coccyx on image 380 of series 3 consistent with fracture of indeterminate age. Age indeterminate compression deformity of L1. Clinical correlation is recommended. There are degenerative changes. There is a scoliosis. Evaluation of the ribs for traumatic injury is limited due to motion artifact; nondisplaced fractures are difficult to exclude. Thereis a large fat-containing right hip lesion measuring 10 cm, indeterminate.    OTHER: There are abdominal pelvic vascular calcifications. The infrarenal abdominal aorta measures up to 2.4 cm..      IMPRESSION:    Free intraperitoneal air in the upper abdomen suggestive of bowel perforation of unclear origin.    Age indeterminate compression deformity of L1 as well as age-indeterminate cortical irregularity to the coccyx. Findings are consistent with fracture of indeterminate age. Clinical correlation for point tenderness is recommended.    Distended gallbladder    Indeterminate fat-containing lesion within the right hip measuring 10 cm          Spoke with PILLO MOROCHO PA on 4/15/2021 3:41 PM with readback.              DERECK GIORDANO MD; Attending Radiologist  This document has been electronically signed. Apr 15 2021  3:49PM (04-15-21 @ 14:43)      CARDIOLOGY TESTING      MEDICATIONS  atorvastatin 40 Oral at bedtime  chlorhexidine 4% Liquid 1 Topical <User Schedule>  meropenem  IVPB 500 IV Intermittent every 12 hours  metoprolol tartrate 75 Oral daily  pantoprazole Infusion 8 IV Continuous <Continuous>  sodium chloride 0.9%. 1000 IV Continuous <Continuous>      Weight  Weight (kg): 58 (04-15-21 @ 21:18)    ANTIBIOTICS:  meropenem  IVPB 500 milliGRAM(s) IV Intermittent every 12 hours      ALLERGIES:  No Known Allergies       RAVICECILLE  89y, Female  Allergy: No Known Allergies      CHIEF COMPLAINT: Anemia , Afib, leeann, acidosis (15 Apr 2021 18:15)      LOS  1d    HPI:  89 year old woman brought in by family for evaluation . patient was found on floor , confused as per grandson today. As per patient, she has been weak since receiving second moderna vaccine last week. Patient with decreased po intake and decreased urine output. patient denies any back pain, no SOB, no chest pain. Patient with hx of a.fib on xarelto, GERD on PPI. patient states she has been having black stools over past few weeks. Pt denies any falls, no LOC.    (15 Apr 2021 18:15)      INFECTIOUS DISEASE HISTORY:  History as above.   WBC to 22 on admission, H/H 6.5/21.3  Found to have air under diaphragm. Evaluated by surgery.   Afebrile on admission.  Cannot recall events leading up to admission.   Denies any fevers, chills at home.   Currently denies any abdominal pain, nausea, vomiting, diarrhea, coughing, shortness of breath     PAST MEDICAL & SURGICAL HISTORY:  Atrial fibrillation    GERD (gastroesophageal reflux disease)    HTN (hypertension)    No significant past surgical history        FAMILY HISTORY  No pertinent family history in first degree relatives        SOCIAL HISTORY  Social History:  Lives alone  NO smoking   No etoh (15 Apr 2021 18:15)        ROS  General: Denies rigors, nightsweats  HEENT: Denies headache, rhinorrhea, sore throat, eye pain  CV: Denies CP, palpitations  PULM: Denies wheezing, hemoptysis  GI: Denies hematemesis, hematochezia, melena  : Denies discharge, hematuria  MSK: Denies arthralgias, myalgias  SKIN: Denies rash, lesions  NEURO: Denies paresthesias, weakness  PSYCH: Denies depression, anxiety    VITALS:  T(F): 97.9, Max: 97.9 (04-16-21 @ 03:00)  HR: 104  BP: 168/81  RR: 20Vital Signs Last 24 Hrs  T(C): 36.6 (16 Apr 2021 03:00), Max: 36.6 (16 Apr 2021 03:00)  T(F): 97.9 (16 Apr 2021 03:00), Max: 97.9 (16 Apr 2021 03:00)  HR: 104 (16 Apr 2021 05:36) (93 - 122)  BP: 168/81 (16 Apr 2021 05:36) (104/67 - 168/81)  BP(mean): 116 (16 Apr 2021 05:36) (81 - 116)  RR: 20 (16 Apr 2021 05:36) (18 - 24)  SpO2: 100% (16 Apr 2021 05:36) (94% - 100%)    PHYSICAL EXAM:  Gen: NAD, resting in bed  HEENT: Normocephalic, atraumatic  Neck: supple, no lymphadenopathy  CV: Regular rate & regular rhythm  Lungs: decreased BS at bases, no fremitus  Abdomen: Soft, BS present  Ext: Warm, well perfused  Neuro: non focal, awake  Skin: no rash, no erythema  Lines: no phlebitis    TESTS & MEASUREMENTS:                        8.4    17.67 )-----------( 237      ( 16 Apr 2021 05:36 )             26.3     04-15    142  |  114<H>  |  73<HH>  ----------------------------<  90  4.9   |  13<L>  |  2.4<H>    Ca    8.2<L>      15 Apr 2021 22:02  Mg     2.7     04-15    TPro  5.2<L>  /  Alb  3.0<L>  /  TBili  1.7<H>  /  DBili  x   /  AST  151<H>  /  ALT  46<H>  /  AlkPhos  67  04-15    eGFR if African American: 20 mL/min/1.73M2 (04-15-21 @ 22:02)  eGFR if Non African American: 17 mL/min/1.73M2 (04-15-21 @ 22:02)  eGFR if Non African American: 16 mL/min/1.73M2 (04-15-21 @ 12:53)  eGFR if African American: 18 mL/min/1.73M2 (04-15-21 @ 12:53)    LIVER FUNCTIONS - ( 15 Apr 2021 22:02 )  Alb: 3.0 g/dL / Pro: 5.2 g/dL / ALK PHOS: 67 U/L / ALT: 46 U/L / AST: 151 U/L / GGT: x                 Lactate, Blood: 1.3 mmol/L (04-16-21 @ 05:36)  Lactate, Blood: 3.1 mmol/L (04-15-21 @ 22:02)  Blood Gas Venous - Lactate: 2.8 mmoL/L (04-15-21 @ 18:25)  Blood Gas Venous - Lactate: 9.2 mmoL/L (04-15-21 @ 13:16)      INFECTIOUS DISEASES TESTING      RADIOLOGY & ADDITIONAL TESTS:  I have personally reviewed the last Chest xray  CXR      CT  CT Abdomen and Pelvis No Cont:   EXAM:  CT CHEST        EXAM:  CT ABDOMEN AND PELVIS            PROCEDURE DATE:  04/15/2021            INTERPRETATION:  CLINICAL STATEMENT: Status post fall    TECHNIQUE: Contiguous axial CT images were obtained from the chest to the pubic symphysiswithout intravenous contrast.  Oral contrast was not given.  Reformatted images in the coronal and sagittal planes were acquired. Additional MIP images were obtained.    COMPARISON CT: None.    Study is severely limited in evaluation due to motion artifact.      FINDINGS:    CHEST: There is a trace nonspecific pericardial effusion. The thyroid gland is present with the right thyroid extending into the superior mediastinum. The thyroid gland is incompletely evaluated on CT. There is no definite evidence of thoracic adenopathy. The heart size is enlarged. There is atherosclerosis. The thoracic aorta is normal in caliber. The central tracheobronchial tree is patent. There is no consolidation, pneumothorax or pleural effusion. There are patchy areas of subsegmental atelectasis.    HEPATOBILIARY: The gallbladder is distended.    SPLEEN: Unremarkable..    PANCREAS: Fatty infiltration of the pancreas.    ADRENAL GLANDS: The left adrenal gland is thickened. The right adrenal gland is unremarkable.    KIDNEYS: The left kidney is atrophic. There may be a right parapelvic cyst, limited in evaluation due to motion. There is no hydronephrosis. There are subcentimeter renal hypodensities, too small to characterize.    ABDOMINOPELVIC NODES: Unremarkable..    PELVIC ORGANS: The uterus and adnexa are incompletely evaluated on CT.    PERITONEUM/MESENTERY/BOWEL: There are punctate foci of free intraperitoneal air in the upper abdomen, for example in the right upper quadrant on image 185 of series 3 and in the left upper quadrant image 177 of series 3. There is no evidence of obstruction.    BONES/SOFT TISSUES: There is cortical irregularity to the coccyx on image 380 of series 3 consistent with fracture of indeterminate age. Age indeterminate compression deformity of L1. Clinical correlation is recommended. There are degenerative changes. There is a scoliosis. Evaluation of the ribs for traumatic injury is limited due to motion artifact; nondisplaced fractures are difficult to exclude. Thereis a large fat-containing right hip lesion measuring 10 cm, indeterminate.    OTHER: There are abdominal pelvic vascular calcifications. The infrarenal abdominal aorta measures up to 2.4 cm..      IMPRESSION:    Free intraperitoneal air in the upper abdomen suggestive of bowel perforation of unclear origin.    Age indeterminate compression deformity of L1 as well as age-indeterminate cortical irregularity to the coccyx. Findings are consistent with fracture of indeterminate age. Clinical correlation for point tenderness is recommended.    Distended gallbladder    Indeterminate fat-containing lesion within the right hip measuring 10 cm          Spoke with PILLO MOROCHO PA on 4/15/2021 3:41 PM with readback.              DERECK GIORDANO MD; Attending Radiologist  This document has been electronically signed. Apr 15 2021  3:49PM (04-15-21 @ 14:43)      CARDIOLOGY TESTING      MEDICATIONS  atorvastatin 40 Oral at bedtime  chlorhexidine 4% Liquid 1 Topical <User Schedule>  meropenem  IVPB 500 IV Intermittent every 12 hours  metoprolol tartrate 75 Oral daily  pantoprazole Infusion 8 IV Continuous <Continuous>  sodium chloride 0.9%. 1000 IV Continuous <Continuous>      Weight  Weight (kg): 58 (04-15-21 @ 21:18)    ANTIBIOTICS:  meropenem  IVPB 500 milliGRAM(s) IV Intermittent every 12 hours      ALLERGIES:  No Known Allergies

## 2021-04-16 NOTE — PROGRESS NOTE ADULT - SUBJECTIVE AND OBJECTIVE BOX
89yFemale  Being followed for bowel perforation  Interval history: Patient denies nausea, vomiting, hematemesis, melena, blood in stool, diarrhea, constipation, abdominal pain. Patient states she feels perfectly fine.      PAST MEDICAL & SURGICAL HISTORY:   Atrial fibrillation    GERD (gastroesophageal reflux disease)    HTN (hypertension)    No significant past surgical history            Social History: No smoking. No alcohol. No illegal drug use.          MEDICATIONS:  MEDICATIONS  (STANDING):  atorvastatin 40 milliGRAM(s) Oral at bedtime  chlorhexidine 4% Liquid 1 Application(s) Topical <User Schedule>  dextrose 5% 1000 milliLiter(s) (100 mL/Hr) IV Continuous <Continuous>  metoprolol tartrate 75 milliGRAM(s) Oral daily  pantoprazole Infusion 8 mG/Hr (10 mL/Hr) IV Continuous <Continuous>    MEDICATIONS  (PRN):  ALPRAZolam 0.25 milliGRAM(s) Oral daily PRN anxiety      Allergies:   No Known Allergies            REVIEW OF SYSTEMS:  General:  No weight loss, fevers, or chills.  Eyes:  No reported pain or visual changes  ENT:  No sore throat or runny nose.  NECK: No stiffness   CV:  No chest pain or palpitations.  Resp:  No shortness of breath, cough  GI:  No abdominal pain, nausea, vomiting, dysphagia, diarrhea or constipation. No rectal bleeding, melena, or hematemesis.  Neuro:  No tingling, numbness           VITAL SIGNS:   T(F): 97 (04-16-21 @ 07:10), Max: 97.9 (04-16-21 @ 03:00)  HR: 107 (04-16-21 @ 07:15) (93 - 122)  BP: 166/70 (04-16-21 @ 07:15) (104/67 - 168/81)  RR: 18 (04-16-21 @ 09:00) (18 - 24)  SpO2: 100% (04-16-21 @ 07:15) (94% - 100%)    PHYSICAL EXAM:  GENERAL: AAOx3, no acute distress.  HEAD:  Atraumatic, Normocephalic  EYES: conjunctiva and sclera clear  NECK: Supple, no JVD or thyromegaly  CHEST/LUNG: Clear to auscultation bilaterally; No wheeze, rhonchi, or rales  HEART: Regular rate and rhythm; normal S1, S2, No murmurs.  ABDOMEN: Soft, nontender, nondistended; Bowel sounds present  NEUROLOGY: No asterixis or tremor.   SKIN: Intact, no jaundice            LABS:                        8.4    17.67 )-----------( 237      ( 16 Apr 2021 05:36 )             26.3     04-16    142  |  112<H>  |  73<HH>  ----------------------------<  79  4.3   |  15<L>  |  2.4<H>    Ca    7.9<L>      16 Apr 2021 05:36  Mg     2.7     04-15    TPro  5.2<L>  /  Alb  3.0<L>  /  TBili  1.7<H>  /  DBili  x   /  AST  151<H>  /  ALT  46<H>  /  AlkPhos  67  04-15    LIVER FUNCTIONS - ( 15 Apr 2021 22:02 )  Alb: 3.0 g/dL / Pro: 5.2 g/dL / ALK PHOS: 67 U/L / ALT: 46 U/L / AST: 151 U/L / GGT: x           PT/INR - ( 15 Apr 2021 12:53 )   PT: 29.50 sec;   INR: 2.57 ratio         PTT - ( 15 Apr 2021 12:53 )  PTT:27.2 sec    IMAGING:    < from: CT Abdomen and Pelvis No Cont (04.15.21 @ 14:43) >    EXAM:  CT CHEST        EXAM:  CT ABDOMEN AND PELVIS            PROCEDURE DATE:  04/15/2021            INTERPRETATION:  CLINICAL STATEMENT: Status post fall    TECHNIQUE: Contiguous axial CT images were obtained from the chest to the pubic symphysiswithout intravenous contrast.  Oral contrast was not given.  Reformatted images in the coronal and sagittal planes were acquired. Additional MIP images were obtained.    COMPARISON CT: None.    Study is severely limited in evaluation due to motion artifact.      FINDINGS:    CHEST: There is a trace nonspecific pericardial effusion. The thyroid gland is present with the right thyroid extending into the superior mediastinum. The thyroid gland is incompletely evaluated on CT. There is no definite evidence of thoracic adenopathy. The heart size is enlarged. There is atherosclerosis. The thoracic aorta is normal in caliber. The central tracheobronchial tree is patent. There is no consolidation, pneumothorax or pleural effusion. There are patchy areas of subsegmental atelectasis.    HEPATOBILIARY: The gallbladder is distended.    SPLEEN: Unremarkable..    PANCREAS: Fatty infiltration of the pancreas.    ADRENAL GLANDS: The left adrenal gland is thickened. The right adrenal gland is unremarkable.    KIDNEYS: The left kidney is atrophic. There may be a right parapelvic cyst, limited in evaluation due to motion. There is no hydronephrosis. There are subcentimeter renal hypodensities, too small to characterize.    ABDOMINOPELVIC NODES: Unremarkable..    PELVIC ORGANS: The uterus and adnexa are incompletely evaluated on CT.    PERITONEUM/MESENTERY/BOWEL: There are punctate foci of free intraperitoneal air in the upper abdomen, for example in the right upper quadrant on image 185 of series 3 and in the left upper quadrant image 177 of series 3. There is no evidence of obstruction.    BONES/SOFT TISSUES: There is cortical irregularity to the coccyx on image 380 of series 3 consistent with fracture of indeterminate age. Age indeterminate compression deformity of L1. Clinical correlation is recommended. There are degenerative changes. There is a scoliosis. Evaluation of the ribs for traumatic injury is limited due to motion artifact; nondisplaced fractures are difficult to exclude. Thereis a large fat-containing right hip lesion measuring 10 cm, indeterminate.    OTHER: There are abdominal pelvic vascular calcifications. The infrarenal abdominal aorta measures up to 2.4 cm..      IMPRESSION:    Free intraperitoneal air in the upper abdomen suggestive of bowel perforation of unclear origin.    Age indeterminate compression deformity of L1 as well as age-indeterminate cortical irregularity to the coccyx. Findings are consistent with fracture of indeterminate age. Clinical correlation for point tenderness is recommended.    Distended gallbladder    Indeterminate fat-containing lesion within the right hip measuring 10 cm          Spoke with PILLO MOROCHO PA on 4/15/2021 3:41 PM with readback.              DERECK GIORDANO MD; Attending Radiologist  This document has been electronically signed. Apr 15 2021  3:49PM    < end of copied text >

## 2021-04-16 NOTE — CONSULT NOTE ADULT - ASSESSMENT
ASSESSMENT  89 year old woman with PMH of Atrial Fibrillation on xeralto, GERD who presents after being found on floor with hx of black stool over past few weeks, found to have free air under diaphragm    IMPRESSION  #Sepsis on admission (WBC>12, HR>90), rule out GN bacteremia  - CT Abdomen and Pelvis No Cont (04.15.21 @ 14:43): Free intraperitoneal air in the upper abdomen suggestive of bowel perforation of unclear origin. Age indeterminate compression deformity of L1 as well as age-indeterminate cortical irregularity to the coccyx. Findings are consistent with fracture of indeterminate age. Clinical correlation for point tenderness is recommended. Distended gallbladder Indeterminate fat-containing lesion within the right hip measuring 10 cm    #GI Bleed  #Transaminitis  #Atrial Fibrillation on xarelto   #Abx allergy: NKDA    RECOMMENDATIONS  This is a preliminary incomplete pended note, all final recommendations to follow after interview and examination of the patient.    Please call or message on [employer] Teams if with any questions.  Spectra [phone]     ASSESSMENT  89 year old woman with PMH of Atrial Fibrillation on xeralto, GERD who presents after being found on floor with hx of black stool over past few weeks, found to have free air under diaphragm    IMPRESSION  #Sepsis on admission (WBC>12, HR>90), rule out GN bacteremia  - CT Abdomen and Pelvis No Cont (04.15.21 @ 14:43): Free intraperitoneal air in the upper abdomen suggestive of bowel perforation of unclear origin. Age indeterminate compression deformity of L1 as well as age-indeterminate cortical irregularity to the coccyx. Findings are consistent with fracture of indeterminate age. Clinical correlation for point tenderness is recommended. Distended gallbladder Indeterminate fat-containing lesion within the right hip measuring 10 cm    #GI Bleed  #Transaminitis  #Atrial Fibrillation on xarelto   #Abx allergy: NKDA    RECOMMENDATIONS  - can narrow meropenem to ceftriaxone 2g daily and flagyl 500 mg TID  - follow-up blood cultures -- if negative, would stop antibiotics  - trend H/H, abdominal exam     Please call or message on [employer] Teams if with any questions.  Spectra [phone]

## 2021-04-16 NOTE — PROGRESS NOTE ADULT - ASSESSMENT
IMPRESSION:  Hypoglycemia  GI bleed s/p 1 unit of PRBCs  Possible microperforation  Sepsis  Rhabdomyolysis  Acute kidney injury  H/o of AFib on xarelto      PLAN:    CNS: avoid depressants    HEENT:  Oral care    PULMONARY:  HOB @ 45 degrees    CARDIOVASCULAR: Continue IV fluids.     GI:  Continue protonix drip.  Keep NPO.  GI and surgery following.                                 RENAL:  F/u  lytes.  Correct as needed.  accurate I/O    INFECTIOUS DISEASE: Continue rocephin/flagyl. F/u cultures.    HEMATOLOGICAL:  DVT prophylaxis. hold xarelto, SCD.    ENDOCRINE:  Follow up FS.      MUSCULOSKELETAL: bed rest     CODE STATUS: FULL CODE    DISPOSITION: Pt requires continued monitoring in the MICU

## 2021-04-16 NOTE — PROGRESS NOTE ADULT - SUBJECTIVE AND OBJECTIVE BOX
S; Pt doing well - denies abdominal pain, N/V/flatus/BM. Has NGT in place with scant output. Received 1 unit PRBC yesterday - xarelto on hold (spoke [facility] cardio: pt ahd been taking moderate amounts of NSAIDS and having on & off melena for weeks PTA)  O; Vital Signs Last 24 Hrs  T(C): 36.1 (16 Apr 2021 07:10), Max: 36.6 (16 Apr 2021 03:00)  T(F): 97 (16 Apr 2021 07:10), Max: 97.9 (16 Apr 2021 03:00)  HR: 107 (16 Apr 2021 07:15) (93 - 122)  BP: 166/70 (16 Apr 2021 07:15) (104/67 - 168/81)  BP(mean): 101 (16 Apr 2021 07:15) (81 - 116)  RR: 18 (16 Apr 2021 09:00) (18 - 24)  SpO2: 100% (16 Apr 2021 07:15) (94% - 100%)    I&O's Detail    15 Apr 2021 07:01  -  16 Apr 2021 07:00  --------------------------------------------------------  IN:    IV PiggyBack: 50 mL    Pantoprazole: 110 mL    sodium chloride 0.9%: 825 mL  Total IN: 985 mL    OUT:    Indwelling Catheter - Urethral (mL): 360 mL  Total OUT: 360 mL    Total NET: 625 mL    MEDICATIONS  (STANDING):  atorvastatin 40 milliGRAM(s) Oral at bedtime  cefTRIAXone   IVPB 2000 milliGRAM(s) IV Intermittent every 24 hours  chlorhexidine 4% Liquid 1 Application(s) Topical <User Schedule>  dextrose 5% 1000 milliLiter(s) (100 mL/Hr) IV Continuous <Continuous>  metoprolol tartrate 75 milliGRAM(s) Oral daily  metroNIDAZOLE  IVPB 500 milliGRAM(s) IV Intermittent every 8 hours  pantoprazole Infusion 8 mG/Hr (10 mL/Hr) IV Continuous <Continuous>    MEDICATIONS  (PRN):  ALPRAZolam 0.25 milliGRAM(s) Oral daily PRN anxiety      EXAM:  gen: nontoxic appearing  abd: soft NT/ND    Labs:  CAPILLARY BLOOD GLUCOSE      POCT Blood Glucose.: 292 mg/dL (15 Apr 2021 13:02)  POCT Blood Glucose.: 11 mg/dL (15 Apr 2021 12:55)  POCT Blood Glucose.: 84 mg/dL (15 Apr 2021 12:54)  POCT Blood Glucose.: 14 mg/dL (15 Apr 2021 12:50)  POCT Blood Glucose.: <10 mg/dL (15 Apr 2021 12:19)                          8.4    17.67 )-----------( 237      ( 16 Apr 2021 05:36 )             26.3       Auto Neutrophil %: 85.1 % (04-15-21 @ 12:53)  Auto Immature Granulocyte %: 1.4 % (04-15-21 @ 12:53)  Band Neutrophils %: 2.0 % (04-15-21 @ 12:53)    04-16    142  |  112<H>  |  73<HH>  ----------------------------<  79  4.3   |  15<L>  |  2.4<H>      Calcium, Total Serum: 7.9 mg/dL (04-16-21 @ 05:36)      LFTs:             5.2  | 1.7  | 151      ------------------[67      ( 15 Apr 2021 22:02 )  3.0  | x    | 46          Lipase:x      Amylase:x         Lactate, Blood: 1.3 mmol/L (04-16-21 @ 05:36)  Lactate, Blood: 3.1 mmol/L (04-15-21 @ 22:02)  Blood Gas Venous - Lactate: 2.8 mmoL/L (04-15-21 @ 18:25)  Blood Gas Venous - Lactate: 9.2 mmoL/L (04-15-21 @ 13:16)      Coags:     29.50  ----< 2.57    ( 15 Apr 2021 12:53 )     27.2        CARDIAC MARKERS ( 16 Apr 2021 05:36 )  x     / 0.07 ng/mL / 7402 U/L / x     / x      CARDIAC MARKERS ( 15 Apr 2021 22:02 )  x     / x     / 7127 U/L / x     / x      CARDIAC MARKERS ( 15 Apr 2021 12:53 )  x     / 0.09 ng/mL / 6376 U/L / x     / x

## 2021-04-16 NOTE — PATIENT PROFILE ADULT - NSTRANSFERBELONGINGSDISPO_GEN_A_NUR
not applicable on 4/17/2021. Also granddaughter Haleigh took pt.s watch home on 4/18/2021/given to security

## 2021-04-16 NOTE — PROGRESS NOTE ADULT - SUBJECTIVE AND OBJECTIVE BOX
Over Night Events:    s/p 1 units of prbcs    ROS:  See HPI    PHYSICAL EXAM    ICU Vital Signs Last 24 Hrs  T(C): 36.1 (16 Apr 2021 07:10), Max: 36.6 (16 Apr 2021 03:00)  T(F): 97 (16 Apr 2021 07:10), Max: 97.9 (16 Apr 2021 03:00)  HR: 107 (16 Apr 2021 07:15) (93 - 122)  BP: 166/70 (16 Apr 2021 07:15) (104/67 - 168/81)  BP(mean): 101 (16 Apr 2021 07:15) (81 - 116)  RR: 18 (16 Apr 2021 09:00) (18 - 24)  SpO2: 100% (16 Apr 2021 07:15) (94% - 100%)      General: NARD  HEENT: WILLIAMS             Lungs: Bilateral BS  Cardiovascular: Regular   Abdomen: Soft, Positive BS  Extremities: No clubbing   Skin: Warm  Neurological: Non focal       04-15-21 @ 07:01  -  04-16-21 @ 07:00  --------------------------------------------------------  IN:    IV PiggyBack: 50 mL    Pantoprazole: 110 mL    sodium chloride 0.9%: 825 mL  Total IN: 985 mL    OUT:    Indwelling Catheter - Urethral (mL): 360 mL  Total OUT: 360 mL    Total NET: 625 mL      04-16-21 @ 07:01  -  04-16-21 @ 09:22  --------------------------------------------------------  IN:    Pantoprazole: 10 mL    sodium chloride 0.9%: 75 mL  Total IN: 85 mL    OUT:    Indwelling Catheter - Urethral (mL): 45 mL  Total OUT: 45 mL    Total NET: 40 mL          LABS:                          8.4    17.67 )-----------( 237      ( 16 Apr 2021 05:36 )             26.3                                               04-16    142  |  112<H>  |  73<HH>  ----------------------------<  79  4.3   |  15<L>  |  2.4<H>    Ca    7.9<L>      16 Apr 2021 05:36  Mg     2.7     04-15    TPro  5.2<L>  /  Alb  3.0<L>  /  TBili  1.7<H>  /  DBili  x   /  AST  151<H>  /  ALT  46<H>  /  AlkPhos  67  04-15      PT/INR - ( 15 Apr 2021 12:53 )   PT: 29.50 sec;   INR: 2.57 ratio         PTT - ( 15 Apr 2021 12:53 )  PTT:27.2 sec                                           CARDIAC MARKERS ( 16 Apr 2021 05:36 )  x     / 0.07 ng/mL / 7402 U/L / x     / x      CARDIAC MARKERS ( 15 Apr 2021 22:02 )  x     / x     / 7127 U/L / x     / x      CARDIAC MARKERS ( 15 Apr 2021 12:53 )  x     / 0.09 ng/mL / 6376 U/L / x     / x                                                LIVER FUNCTIONS - ( 15 Apr 2021 22:02 )  Alb: 3.0 g/dL / Pro: 5.2 g/dL / ALK PHOS: 67 U/L / ALT: 46 U/L / AST: 151 U/L / GGT: x                                                                                                                                       MEDICATIONS  (STANDING):  atorvastatin 40 milliGRAM(s) Oral at bedtime  chlorhexidine 4% Liquid 1 Application(s) Topical <User Schedule>  meropenem  IVPB 500 milliGRAM(s) IV Intermittent every 12 hours  metoprolol tartrate 75 milliGRAM(s) Oral daily  pantoprazole Infusion 8 mG/Hr (10 mL/Hr) IV Continuous <Continuous>  sodium chloride 0.9%. 1000 milliLiter(s) (75 mL/Hr) IV Continuous <Continuous>    MEDICATIONS  (PRN):  ALPRAZolam 0.25 milliGRAM(s) Oral daily PRN anxiety      Xrays:                                                                                     ECHO     Over Night Events:    s/p 1 units of prbcs    ROS:  See HPI    PHYSICAL EXAM    ICU Vital Signs Last 24 Hrs  T(C): 36.1 (16 Apr 2021 07:10), Max: 36.6 (16 Apr 2021 03:00)  T(F): 97 (16 Apr 2021 07:10), Max: 97.9 (16 Apr 2021 03:00)  HR: 107 (16 Apr 2021 07:15) (93 - 122)  BP: 166/70 (16 Apr 2021 07:15) (104/67 - 168/81)  BP(mean): 101 (16 Apr 2021 07:15) (81 - 116)  RR: 18 (16 Apr 2021 09:00) (18 - 24)  SpO2: 100% (16 Apr 2021 07:15) (94% - 100%)      General: NARD  HEENT: WILLIAMS             Lungs: Bilateral BS  Cardiovascular: Regular   Abdomen: Soft, Positive BS  Extremities: No clubbing   Skin: Warm  Neurological: Non focal       04-15-21 @ 07:01  -  04-16-21 @ 07:00  --------------------------------------------------------  IN:    IV PiggyBack: 50 mL    Pantoprazole: 110 mL    sodium chloride 0.9%: 825 mL  Total IN: 985 mL    OUT:    Indwelling Catheter - Urethral (mL): 360 mL  Total OUT: 360 mL    Total NET: 625 mL      04-16-21 @ 07:01  -  04-16-21 @ 09:22  --------------------------------------------------------  IN:    Pantoprazole: 10 mL    sodium chloride 0.9%: 75 mL  Total IN: 85 mL    OUT:    Indwelling Catheter - Urethral (mL): 45 mL  Total OUT: 45 mL    Total NET: 40 mL          LABS:                          8.4    17.67 )-----------( 237      ( 16 Apr 2021 05:36 )             26.3                                               04-16    142  |  112<H>  |  73<HH>  ----------------------------<  79  4.3   |  15<L>  |  2.4<H>    Ca    7.9<L>      16 Apr 2021 05:36  Mg     2.7     04-15    TPro  5.2<L>  /  Alb  3.0<L>  /  TBili  1.7<H>  /  DBili  x   /  AST  151<H>  /  ALT  46<H>  /  AlkPhos  67  04-15      PT/INR - ( 15 Apr 2021 12:53 )   PT: 29.50 sec;   INR: 2.57 ratio         PTT - ( 15 Apr 2021 12:53 )  PTT:27.2 sec                                           CARDIAC MARKERS ( 16 Apr 2021 05:36 )  x     / 0.07 ng/mL / 7402 U/L / x     / x      CARDIAC MARKERS ( 15 Apr 2021 22:02 )  x     / x     / 7127 U/L / x     / x      CARDIAC MARKERS ( 15 Apr 2021 12:53 )  x     / 0.09 ng/mL / 6376 U/L / x     / x                                                LIVER FUNCTIONS - ( 15 Apr 2021 22:02 )  Alb: 3.0 g/dL / Pro: 5.2 g/dL / ALK PHOS: 67 U/L / ALT: 46 U/L / AST: 151 U/L / GGT: x                                                                                                                                       MEDICATIONS  (STANDING):  atorvastatin 40 milliGRAM(s) Oral at bedtime  chlorhexidine 4% Liquid 1 Application(s) Topical <User Schedule>  meropenem  IVPB 500 milliGRAM(s) IV Intermittent every 12 hours  metoprolol tartrate 75 milliGRAM(s) Oral daily  pantoprazole Infusion 8 mG/Hr (10 mL/Hr) IV Continuous <Continuous>  sodium chloride 0.9%. 1000 milliLiter(s) (75 mL/Hr) IV Continuous <Continuous>    MEDICATIONS  (PRN):  ALPRAZolam 0.25 milliGRAM(s) Oral daily PRN anxiety      CXR and CT scans reviewed  personally

## 2021-04-17 LAB
ALBUMIN SERPL ELPH-MCNC: 2.6 G/DL — LOW (ref 3.5–5.2)
ALP SERPL-CCNC: 62 U/L — SIGNIFICANT CHANGE UP (ref 30–115)
ALT FLD-CCNC: 48 U/L — HIGH (ref 0–41)
ANION GAP SERPL CALC-SCNC: 12 MMOL/L — SIGNIFICANT CHANGE UP (ref 7–14)
AST SERPL-CCNC: 156 U/L — HIGH (ref 0–41)
BASOPHILS # BLD AUTO: 0.01 K/UL — SIGNIFICANT CHANGE UP (ref 0–0.2)
BASOPHILS # BLD AUTO: 0.01 K/UL — SIGNIFICANT CHANGE UP (ref 0–0.2)
BASOPHILS NFR BLD AUTO: 0.1 % — SIGNIFICANT CHANGE UP (ref 0–1)
BASOPHILS NFR BLD AUTO: 0.1 % — SIGNIFICANT CHANGE UP (ref 0–1)
BILIRUB SERPL-MCNC: 0.6 MG/DL — SIGNIFICANT CHANGE UP (ref 0.2–1.2)
BUN SERPL-MCNC: 63 MG/DL — CRITICAL HIGH (ref 10–20)
CALCIUM SERPL-MCNC: 7.4 MG/DL — LOW (ref 8.5–10.1)
CHLORIDE SERPL-SCNC: 111 MMOL/L — HIGH (ref 98–110)
CK SERPL-CCNC: 7080 U/L — HIGH (ref 0–225)
CO2 SERPL-SCNC: 26 MMOL/L — SIGNIFICANT CHANGE UP (ref 17–32)
COVID-19 SPIKE DOMAIN AB INTERP: POSITIVE
COVID-19 SPIKE DOMAIN ANTIBODY RESULT: 68.7 U/ML — HIGH
CREAT SERPL-MCNC: 2.3 MG/DL — HIGH (ref 0.7–1.5)
CULTURE RESULTS: NO GROWTH — SIGNIFICANT CHANGE UP
EOSINOPHIL # BLD AUTO: 0.03 K/UL — SIGNIFICANT CHANGE UP (ref 0–0.7)
EOSINOPHIL # BLD AUTO: 0.04 K/UL — SIGNIFICANT CHANGE UP (ref 0–0.7)
EOSINOPHIL NFR BLD AUTO: 0.2 % — SIGNIFICANT CHANGE UP (ref 0–8)
EOSINOPHIL NFR BLD AUTO: 0.3 % — SIGNIFICANT CHANGE UP (ref 0–8)
GLUCOSE SERPL-MCNC: 148 MG/DL — HIGH (ref 70–99)
HCT VFR BLD CALC: 23.2 % — LOW (ref 37–47)
HCT VFR BLD CALC: 29.3 % — LOW (ref 37–47)
HGB BLD-MCNC: 7.5 G/DL — LOW (ref 12–16)
HGB BLD-MCNC: 9.5 G/DL — LOW (ref 12–16)
IMM GRANULOCYTES NFR BLD AUTO: 0.5 % — HIGH (ref 0.1–0.3)
IMM GRANULOCYTES NFR BLD AUTO: 0.5 % — HIGH (ref 0.1–0.3)
LYMPHOCYTES # BLD AUTO: 0.7 K/UL — LOW (ref 1.2–3.4)
LYMPHOCYTES # BLD AUTO: 0.97 K/UL — LOW (ref 1.2–3.4)
LYMPHOCYTES # BLD AUTO: 5.6 % — LOW (ref 20.5–51.1)
LYMPHOCYTES # BLD AUTO: 8.1 % — LOW (ref 20.5–51.1)
MAGNESIUM SERPL-MCNC: 2.5 MG/DL — HIGH (ref 1.8–2.4)
MCHC RBC-ENTMCNC: 30.5 PG — SIGNIFICANT CHANGE UP (ref 27–31)
MCHC RBC-ENTMCNC: 32.1 PG — HIGH (ref 27–31)
MCHC RBC-ENTMCNC: 32.3 G/DL — SIGNIFICANT CHANGE UP (ref 32–37)
MCHC RBC-ENTMCNC: 32.4 G/DL — SIGNIFICANT CHANGE UP (ref 32–37)
MCV RBC AUTO: 94.2 FL — SIGNIFICANT CHANGE UP (ref 81–99)
MCV RBC AUTO: 99.1 FL — HIGH (ref 81–99)
MONOCYTES # BLD AUTO: 1.04 K/UL — HIGH (ref 0.1–0.6)
MONOCYTES # BLD AUTO: 1.13 K/UL — HIGH (ref 0.1–0.6)
MONOCYTES NFR BLD AUTO: 8.7 % — SIGNIFICANT CHANGE UP (ref 1.7–9.3)
MONOCYTES NFR BLD AUTO: 9.1 % — SIGNIFICANT CHANGE UP (ref 1.7–9.3)
NEUTROPHILS # BLD AUTO: 10.55 K/UL — HIGH (ref 1.4–6.5)
NEUTROPHILS # BLD AUTO: 9.83 K/UL — HIGH (ref 1.4–6.5)
NEUTROPHILS NFR BLD AUTO: 82.3 % — HIGH (ref 42.2–75.2)
NEUTROPHILS NFR BLD AUTO: 84.5 % — HIGH (ref 42.2–75.2)
NRBC # BLD: 0 /100 WBCS — SIGNIFICANT CHANGE UP (ref 0–0)
NRBC # BLD: 0 /100 WBCS — SIGNIFICANT CHANGE UP (ref 0–0)
PLATELET # BLD AUTO: 174 K/UL — SIGNIFICANT CHANGE UP (ref 130–400)
PLATELET # BLD AUTO: 179 K/UL — SIGNIFICANT CHANGE UP (ref 130–400)
POTASSIUM SERPL-MCNC: 3.3 MMOL/L — LOW (ref 3.5–5)
POTASSIUM SERPL-SCNC: 3.3 MMOL/L — LOW (ref 3.5–5)
PROT SERPL-MCNC: 4.5 G/DL — LOW (ref 6–8)
RBC # BLD: 2.34 M/UL — LOW (ref 4.2–5.4)
RBC # BLD: 3.11 M/UL — LOW (ref 4.2–5.4)
RBC # FLD: 18.3 % — HIGH (ref 11.5–14.5)
RBC # FLD: 20.8 % — HIGH (ref 11.5–14.5)
SARS-COV-2 IGG+IGM SERPL QL IA: 68.7 U/ML — HIGH
SARS-COV-2 IGG+IGM SERPL QL IA: POSITIVE
SODIUM SERPL-SCNC: 149 MMOL/L — HIGH (ref 135–146)
SPECIMEN SOURCE: SIGNIFICANT CHANGE UP
WBC # BLD: 11.95 K/UL — HIGH (ref 4.8–10.8)
WBC # BLD: 12.48 K/UL — HIGH (ref 4.8–10.8)
WBC # FLD AUTO: 11.95 K/UL — HIGH (ref 4.8–10.8)
WBC # FLD AUTO: 12.48 K/UL — HIGH (ref 4.8–10.8)

## 2021-04-17 PROCEDURE — 99232 SBSQ HOSP IP/OBS MODERATE 35: CPT

## 2021-04-17 RX ORDER — METOPROLOL TARTRATE 50 MG
5 TABLET ORAL EVERY 6 HOURS
Refills: 0 | Status: DISCONTINUED | OUTPATIENT
Start: 2021-04-17 | End: 2021-04-19

## 2021-04-17 RX ORDER — SODIUM CHLORIDE 9 MG/ML
1000 INJECTION, SOLUTION INTRAVENOUS
Refills: 0 | Status: DISCONTINUED | OUTPATIENT
Start: 2021-04-17 | End: 2021-04-19

## 2021-04-17 RX ORDER — SODIUM CHLORIDE 9 MG/ML
1000 INJECTION, SOLUTION INTRAVENOUS
Refills: 0 | Status: DISCONTINUED | OUTPATIENT
Start: 2021-04-17 | End: 2021-04-17

## 2021-04-17 RX ORDER — POTASSIUM CHLORIDE 20 MEQ
20 PACKET (EA) ORAL ONCE
Refills: 0 | Status: COMPLETED | OUTPATIENT
Start: 2021-04-17 | End: 2021-04-17

## 2021-04-17 RX ADMIN — Medication 100 MILLIGRAM(S): at 22:04

## 2021-04-17 RX ADMIN — SODIUM CHLORIDE 75 MILLILITER(S): 9 INJECTION, SOLUTION INTRAVENOUS at 19:49

## 2021-04-17 RX ADMIN — Medication 5 MILLIGRAM(S): at 17:08

## 2021-04-17 RX ADMIN — SODIUM CHLORIDE 75 MILLILITER(S): 9 INJECTION, SOLUTION INTRAVENOUS at 09:24

## 2021-04-17 RX ADMIN — Medication 100 MILLIGRAM(S): at 13:59

## 2021-04-17 RX ADMIN — Medication 100 MILLIGRAM(S): at 05:03

## 2021-04-17 RX ADMIN — CHLORHEXIDINE GLUCONATE 1 APPLICATION(S): 213 SOLUTION TOPICAL at 11:05

## 2021-04-17 RX ADMIN — PANTOPRAZOLE SODIUM 10 MG/HR: 20 TABLET, DELAYED RELEASE ORAL at 07:17

## 2021-04-17 RX ADMIN — SODIUM CHLORIDE 100 MILLILITER(S): 9 INJECTION, SOLUTION INTRAVENOUS at 07:16

## 2021-04-17 RX ADMIN — Medication 1 MILLIGRAM(S): at 22:03

## 2021-04-17 RX ADMIN — PANTOPRAZOLE SODIUM 10 MG/HR: 20 TABLET, DELAYED RELEASE ORAL at 19:49

## 2021-04-17 RX ADMIN — Medication 50 MILLIEQUIVALENT(S): at 09:08

## 2021-04-17 RX ADMIN — CEFTRIAXONE 100 MILLIGRAM(S): 500 INJECTION, POWDER, FOR SOLUTION INTRAMUSCULAR; INTRAVENOUS at 11:42

## 2021-04-17 RX ADMIN — Medication 5 MILLIGRAM(S): at 11:05

## 2021-04-17 RX ADMIN — SODIUM CHLORIDE 75 MILLILITER(S): 9 INJECTION, SOLUTION INTRAVENOUS at 21:25

## 2021-04-17 NOTE — PROGRESS NOTE ADULT - ASSESSMENT
Impression:  88 y/o female with CT scan findings of punctate areas of abdominal free air.        Plan:  - Continue present management per ICU.   - Patient seen, examined and discussed with Dr. Mcfadden and can continue with Ivabx and present medical mgt.  No need for repeat CT scan at this time.

## 2021-04-17 NOTE — PROGRESS NOTE ADULT - SUBJECTIVE AND OBJECTIVE BOX
Patient is a 89y old  Female who presents with a chief complaint of Actute Anemia , Afib, TYRESE, acidosis (2021 12:34)      Over Night Events:  Patient seen and examined.   still in rapid afib BP stable   NPO     ROS:  See HPI    PHYSICAL EXAM    ICU Vital Signs Last 24 Hrs  T(C): 35.9 (2021 18:56), Max: 36.1 (2021 07:10)  T(F): 96.7 (2021 18:56), Max: 97 (2021 07:10)  HR: 82 (2021 05:00) (82 - 137)  BP: 125/57 (2021 05:00) (91/50 - 179/76)  BP(mean): 84 (2021 05:00) (67 - 109)  ABP: --  ABP(mean): --  RR: 11 (2021 05:00) (11 - 39)  SpO2: 95% (2021 05:00) (79% - 100%)      General:awake   HEENT:      pale          Lymph Nodes: NO cervical LN   Lungs: Bilateral BS  Cardiovascular: Regular   Abdomen: Soft, Positive BS  Extremities: No clubbing   Skin: warm   Neurological: no focal   Musculoskeletal: move all ext     I&O's Detail    2021 07:01  -  2021 07:00  --------------------------------------------------------  IN:    dextrose 5% w/ Additives: 2000 mL    IV PiggyBack: 400 mL    Oral Fluid: 100 mL    Pantoprazole: 230 mL    sodium chloride 0.9%: 225 mL  Total IN: 2955 mL    OUT:    Indwelling Catheter - Urethral (mL): 730 mL  Total OUT: 730 mL    Total NET: 2225 mL          LABS:                          7.5    11.95 )-----------( 174      ( 2021 05:32 )             23.2         2021 05:36    142    |  112    |  73     ----------------------------<  79     4.3     |  15     |  2.4      Ca    7.9        2021 05:36  Mg     2.7       15 Apr 2021 22:02                                               PT/INR - ( 15 Apr 2021 12:53 )   PT: 29.50 sec;   INR: 2.57 ratio         PTT - ( 15 Apr 2021 12:53 )  PTT:27.2 sec                                       Urinalysis Basic - ( 2021 11:00 )    Color: Yellow / Appearance: Clear / S.020 / pH: x  Gluc: x / Ketone: 15  / Bili: Small / Urobili: 1.0 mg/dL   Blood: x / Protein: >=300 mg/dL / Nitrite: Negative   Leuk Esterase: Negative / RBC: 3-5 /HPF / WBC 1-2 /HPF   Sq Epi: x / Non Sq Epi: Occasional /HPF / Bacteria: Moderate        Lactate, Blood: 1.3 mmol/L (21 @ 05:36)  Lactate, Blood: 3.1 mmol/L (04-15-21 @ 22:02)    CARDIAC MARKERS ( 2021 11:17 )  x     / 0.06 ng/mL / x     / x     / x      CARDIAC MARKERS ( 2021 05:36 )  x     / 0.07 ng/mL / 7402 U/L / x     / x      CARDIAC MARKERS ( 15 Apr 2021 22:02 )  x     / x     / 7127 U/L / x     / x      CARDIAC MARKERS ( 15 Apr 2021 12:53 )  x     / 0.09 ng/mL / 6376 U/L / x     / x                                                                                                                                                 MEDICATIONS  (STANDING):  cefTRIAXone   IVPB 2000 milliGRAM(s) IV Intermittent every 24 hours  chlorhexidine 4% Liquid 1 Application(s) Topical <User Schedule>  dextrose 5% 1000 milliLiter(s) (100 mL/Hr) IV Continuous <Continuous>  metoprolol tartrate 50 milliGRAM(s) Oral two times a day  metroNIDAZOLE  IVPB 500 milliGRAM(s) IV Intermittent every 8 hours  pantoprazole Infusion 8 mG/Hr (10 mL/Hr) IV Continuous <Continuous>    MEDICATIONS  (PRN):  ALPRAZolam 0.25 milliGRAM(s) Oral daily PRN anxiety          Xrays:  TLC:  OG:  ET tube:                                                                                       ECHO:  CAM ICU:

## 2021-04-17 NOTE — PROGRESS NOTE ADULT - SUBJECTIVE AND OBJECTIVE BOX
89yFemale  Being followed for bowel perforation  Interval history: Patient denies nausea, vomiting, hematemesis, melena, blood in stool, diarrhea, constipation, abdominal pain. Patient states she feels perfectly fine.    PAST MEDICAL & SURGICAL HISTORY:     Atrial fibrillation    GERD (gastroesophageal reflux disease)    HTN (hypertension)    No significant past surgical history    Social History: No smoking. No alcohol. No illegal drug use.    MEDICATIONS:  MEDICATIONS  (STANDING):  atorvastatin 40 milliGRAM(s) Oral at bedtime  chlorhexidine 4% Liquid 1 Application(s) Topical <User Schedule>  dextrose 5% 1000 milliLiter(s) (100 mL/Hr) IV Continuous <Continuous>  metoprolol tartrate 75 milliGRAM(s) Oral daily  pantoprazole Infusion 8 mG/Hr (10 mL/Hr) IV Continuous <Continuous>    MEDICATIONS  (PRN):  ALPRAZolam 0.25 milliGRAM(s) Oral daily PRN anxiety      Allergies:   No Known Allergies      REVIEW OF SYSTEMS:  General:  No weight loss, fevers, or chills.  Eyes:  No reported pain or visual changes  ENT:  No sore throat or runny nose.  NECK: No stiffness   CV:  No chest pain or palpitations.  Resp:  No shortness of breath, cough  GI:  No abdominal pain, nausea, vomiting, dysphagia, diarrhea or constipation. No rectal bleeding, melena, or hematemesis.  Neuro:  No tingling, numbness     VITAL SIGNS:   T(F): 97 (04-16-21 @ 07:10), Max: 97.9 (04-16-21 @ 03:00)  HR: 107 (04-16-21 @ 07:15) (93 - 122)  BP: 166/70 (04-16-21 @ 07:15) (104/67 - 168/81)  RR: 18 (04-16-21 @ 09:00) (18 - 24)  SpO2: 100% (04-16-21 @ 07:15) (94% - 100%)    PHYSICAL EXAM:  GENERAL: AAOx3, no acute distress.  HEAD:  Atraumatic, Normocephalic  EYES: conjunctiva and sclera clear  NECK: Supple, no JVD or thyromegaly  CHEST/LUNG: Clear to auscultation bilaterally; No wheeze, rhonchi, or rales  HEART: Regular rate and rhythm; normal S1, S2, No murmurs.  ABDOMEN: Soft, nontender, nondistended; Bowel sounds present  NEUROLOGY: No asterixis or tremor.   SKIN: Intact, no jaundice    LABS:                        8.4    17.67 )-----------( 237      ( 16 Apr 2021 05:36 )             26.3         PT/INR - ( 15 Apr 2021 12:53 )   PT: 29.50 sec;   INR: 2.57 ratio         PTT - ( 15 Apr 2021 12:53 )  PTT:27.2 sec    IMAGING:    < from: CT Abdomen and Pelvis No Cont (04.15.21 @ 14:43) >    EXAM:  CT CHEST        EXAM:  CT ABDOMEN AND PELVIS            PROCEDURE DATE:  04/15/2021            INTERPRETATION:  CLINICAL STATEMENT: Status post fall    TECHNIQUE: Contiguous axial CT images were obtained from the chest to the pubic symphysiswithout intravenous contrast.  Oral contrast was not given.  Reformatted images in the coronal and sagittal planes were acquired. Additional MIP images were obtained.    COMPARISON CT: None.    Study is severely limited in evaluation due to motion artifact.      FINDINGS:    CHEST: There is a trace nonspecific pericardial effusion. The thyroid gland is present with the right thyroid extending into the superior mediastinum. The thyroid gland is incompletely evaluated on CT. There is no definite evidence of thoracic adenopathy. The heart size is enlarged. There is atherosclerosis. The thoracic aorta is normal in caliber. The central tracheobronchial tree is patent. There is no consolidation, pneumothorax or pleural effusion. There are patchy areas of subsegmental atelectasis.    HEPATOBILIARY: The gallbladder is distended.    SPLEEN: Unremarkable..    PANCREAS: Fatty infiltration of the pancreas.    ADRENAL GLANDS: The left adrenal gland is thickened. The right adrenal gland is unremarkable.    KIDNEYS: The left kidney is atrophic. There may be a right parapelvic cyst, limited in evaluation due to motion. There is no hydronephrosis. There are subcentimeter renal hypodensities, too small to characterize.    ABDOMINOPELVIC NODES: Unremarkable..    PELVIC ORGANS: The uterus and adnexa are incompletely evaluated on CT.    PERITONEUM/MESENTERY/BOWEL: There are punctate foci of free intraperitoneal air in the upper abdomen, for example in the right upper quadrant on image 185 of series 3 and in the left upper quadrant image 177 of series 3. There is no evidence of obstruction.    BONES/SOFT TISSUES: There is cortical irregularity to the coccyx on image 380 of series 3 consistent with fracture of indeterminate age. Age indeterminate compression deformity of L1. Clinical correlation is recommended. There are degenerative changes. There is a scoliosis. Evaluation of the ribs for traumatic injury is limited due to motion artifact; nondisplaced fractures are difficult to exclude. Thereis a large fat-containing right hip lesion measuring 10 cm, indeterminate.    OTHER: There are abdominal pelvic vascular calcifications. The infrarenal abdominal aorta measures up to 2.4 cm..      IMPRESSION:    Free intraperitoneal air in the upper abdomen suggestive of bowel perforation of unclear origin.    Age indeterminate compression deformity of L1 as well as age-indeterminate cortical irregularity to the coccyx. Findings are consistent with fracture of indeterminate age. Clinical correlation for point tenderness is recommended.    Distended gallbladder    Indeterminate fat-containing lesion within the right hip measuring 10 cm

## 2021-04-17 NOTE — PROGRESS NOTE ADULT - SUBJECTIVE AND OBJECTIVE BOX
Chart reviewed, patient examined. Pertinent results reviewed.  Case discussed with HO; specialist f/u reviewed  HD#2  SUBJECTIVE:    Patient is a 88 y/o Female who presented after being found unresponsiveness on floor.  Had Pan- scans in the ED.      Currently admitted to medicine with the primary diagnosis of GI bleed and rhabdomyolysis, and possible perforated viscous;      Patient was seen and examined at bedside. This morning she is resting in bed. TYRESE improving.  s/p 1 unit PRBC, 2nd unit just started this AM. She remains lethargic.      PAST MEDICAL & SURGICAL HISTORY  PAST MEDICAL & SURGICAL HISTORY:  Atrial fibrillation    GERD (gastroesophageal reflux disease)    HTN (hypertension)  Dyslipidemia on statin    No significant past surgical history      SOCIAL HISTORY:  Lives alone  NO smoking   No etoh    ALLERGIES:  No Known Allergies    MEDICATIONS:  MEDICATIONS  (STANDING):  cefTRIAXone   IVPB 2000 milliGRAM(s) IV Intermittent every 24 hours  chlorhexidine 4% Liquid 1 Application(s) Topical <User Schedule>  dextrose 5% 1000 milliLiter(s) (100 mL/Hr) IV Continuous <Continuous>  metoprolol tartrate 50 milliGRAM(s) Oral two times a day  metroNIDAZOLE  IVPB 500 milliGRAM(s) IV Intermittent every 8 hours  pantoprazole Infusion 8 mG/Hr (10 mL/Hr) IV Continuous <Continuous>    MEDICATIONS  (PRN):  ALPRAZolam 0.25 milliGRAM(s) Oral daily PRN anxiety  recived 0.5 mg IV lorazepam last PM    VITALS:   ICU Vital Signs Last 24 Hrs  T(C): 36.4 (2021 07:05), Max: 36.4 (2021 07:05)  T(F): 97.5 (2021 07:05), Max: 97.5 (2021 07:05)  HR: 115 (2021 07:00) (82 - 137)  BP: 163/88 (2021 07:00) (91/50 - 163/88)  BP(mean): 114 (2021 07:00) (67 - 114)  ABP: --  ABP(mean): --  RR: 16 (2021 07:05) (11 - 39)  SpO2: 96% (2021 07:00) (79% - 100%)    LABS:                          7.5    11.95 )-----------( 174      ( 2021 05:32 )             23.2                       8.0    16.87 )-----------( 227      ( 2021 11:17 )             24.9         149<H>  |  111<H>  |  63<HH>  ----------------------------<  148<H>  3.3<L>   |  26  |  2.3<H>    Ca    7.4<L>      2021 05:32  Mg     2.5         TPro  4.5<L>  /  Alb  2.6<L>  /  TBili  0.6  /  DBili  x   /  AST  156<H>  /  ALT  48<H>  /  AlkPhos  62      142  |  112<H>  |  73<HH>  ----------------------------<  79  4.3   |  15<L>  |  2.4<H>    Ca    7.9<L>      2021 05:36  Mg     2.7     04-15    TPro  5.2<L>  /  Alb  3.0<L>  /  TBili  1.7<H>  /  DBili  x   /  AST  151<H>  /  ALT  46<H>  /  AlkPhos  67  04-15    PT/INR - ( 15 Apr 2021 12:53 )   PT: 29.50 sec;   INR: 2.57 ratio         PTT - ( 15 Apr 2021 12:53 )  PTT:27.2 sec  Urinalysis Basic - ( 2021 11:00 )    Color: Yellow / Appearance: Clear / S.020 / pH: x  Gluc: x / Ketone: 15  / Bili: Small / Urobili: 1.0 mg/dL   Blood: x / Protein: >=300 mg/dL / Nitrite: Negative   Leuk Esterase: Negative / RBC: 3-5 /HPF / WBC 1-2 /HPF   Sq Epi: x / Non Sq Epi: Occasional /HPF / Bacteria: Moderate        Troponin T, Serum: 0.06 ng/mL *HH* (21 @ 11:17)  Creatine Kinase, Serum: 7402 U/L *H* (21 @ 05:36)  Lactate, Blood: 1.3 mmol/L (21 @ 05:36)  Troponin T, Serum: 0.07 ng/mL *HH* (21 @ 05:36)  Lactate, Blood: 3.1 mmol/L *H* (04-15-21 @ 22:02)  Creatine Kinase, Serum: 7127 U/L *H* (04-15-21 @ 22:02)  Troponin T, Serum: 0.09 ng/mL *HH* (04-15-21 @ 12:53)  Creatine Kinase, Serum: 6376 U/L *H* (04-15-21 @ 12:53)      CARDIAC MARKERS ( 2021 11:17 )  x     / 0.06 ng/mL / x     / x     / x      CARDIAC MARKERS ( 2021 05:36 )  x     / 0.07 ng/mL / 7402 U/L / x     / x      CARDIAC MARKERS ( 15 Apr 2021 22:02 )  x     / x     / 7127 U/L / x     / x      CARDIAC MARKERS ( 15 Apr 2021 12:53 )  x     / 0.09 ng/mL / 6376 U/L / x     / x          RADIOLOGY:  < from: Xray Chest 1 View- PORTABLE-Urgent (Xray Chest 1 View- PORTABLE-Urgent .) (04.15.21 @ 19:27) >    Impression:    No radiographic evidence of acute cardiopulmonary disease.        < end of copied text >  < from: Xray Femur 2 Views, Right (04.15.21 @ 17:24) >    Findings/  impression:    No acute displaced fracture or dislocation.    Degenerative change.    Vascular calcifications.        < end of copied text >  < from: CT Chest No Cont (04.15.21 @ 14:43) >    IMPRESSION:    Free intraperitoneal air in the upper abdomen suggestive of bowel perforation of unclear origin.    Age indeterminate compression deformity of L1 as well as age-indeterminate cortical irregularity to the coccyx. Findings are consistent with fracture of indeterminate age. Clinical correlation for point tenderness is recommended.    Distended gallbladder    Indeterminate fat-containing lesion within the right hip measuring 10 cm          < end of copied text >  < from: CT Cervical Spine No Cont (04.15.21 @ 13:51) >    IMPRESSION:    CTHEAD:  No evidence of acute intracranial pathology. No evidence of calvarial fracture, intracranial hemorrhage mass effect or midline shift.    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          < end of copied text >  < from: CT Head No Cont (04.15.21 @ 13:51) >    IMPRESSION:    CTHEAD:  No evidence of acute intracranial pathology. No evidence of calvarial fracture, intracranial hemorrhage mass effect or midline shift.    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        < end of copied text >      PHYSICAL EXAM:  GENERAL: NARD; lethargic; Ox1; confused; gen weak; follows no command  HEAD: Atraumatic  NECK: Supple  CHEST/LUNG: Clear  bilaterally;  no R/W/Rh  HEART:  RRR; No murmurs, rubs, or gallops  ABDOMEN: BS+; Soft, Non-tender, Non-distended  EXTREMITIES:  2+ Peripheral Pulses, No clubbing, cyanosis, or edema; + ECS  PSYCH:  calm now  NEUROLOGY: non-focal; mod-sev gen weakness; + withdraws from pain  SKIN: No rashes or lesions

## 2021-04-17 NOTE — PROGRESS NOTE ADULT - ASSESSMENT
89yFemale pmh A-Fib on Eliquis last dose yesterday, GERD, HTN presents for fall. Patient reports intermittent dark brown stools and GI was called for her anemia.    Problem 1-Anemia with intermittent dark stools as per patient  ddx PUD, malignancy, retroperitoneal hemorrhage, diverticulosis  -rectal exam without evidence for active GI bleeding   Free intraperitoneal air in the upper abdomen suggestive of bowel perforation of unclear origin.  Rec  -monitor patient, diet resumption as per surgery  -EGD in one-two months after healing of perforation   -NPO  -Transfuse prn to hgb >8  -Two large bore IV lines  -Continue PPI drip  -Monitor Vital Signs  -advance to clear liquids    Problem 2-Altered liver chemistries   -distended gallbladder  ddx cholestasis of sepsis, rhabdo , infectious etiology  Rec  -RUQ ultrasound  -treat sepsis  -surgery on board  -If LFTs fail to improve Check Hepatitis B Sag, Hep B SAB, Hep A Ab, Hep C Ab,Smooth Muscle Antibody, Anti LK M1 antibody, AMA, KEVIN, Ceruloplasmin, Ferritin, Alpha-1-antitrypsin, CMV, Herpes serologies, EBV serologies,

## 2021-04-17 NOTE — CONSULT NOTE ADULT - CONSULT REASON
GI perforation
intermittent melenic stools
punctate foci of free intraperitoneal air in the upper abdomen on CT
TYRESE
afib gi bleed

## 2021-04-17 NOTE — PROGRESS NOTE ADULT - ASSESSMENT
Patient lethargic. But given atvan. H/H lower. HR increased. Need to transfuse. Repeat lytes. CPK high secondary being on floor. Check labs. May need consult renal . If h/h continues decrease. May need to repeat ct scan. Prognosis guarded. IV lopressor if not able take po.

## 2021-04-17 NOTE — CONSULT NOTE ADULT - ASSESSMENT
1)  Renal insufficiency w/out known baseline creatinine, pt denies known h/o kidney disease.  Therefore, suspect TYRESE, stable in hospital.  Intra abdominal sepsis certainly could be cause, along w/ wide fluctuations in BP.  Apparently on PPI at home, and therefore AIN entertained.  No obstruction on CT, but there is mention of atrophic kidney suggestive of some underlying CKD    2)  Proteinuria on U/A, ? if chronic from an underlying nephropathy vs acute and reversible due to acute illness, increased glomerular permeability due to neurohumoral activation    3)  Hypernatremia indicative of true water depletion    4)  Metabolic acidosis, resolved    5)  Mild hypokalemia, s/p KCl 20meq IV x 1 today    Recommendations:    1)  Would change IVF's to a hypotonic solution, such as 1/2 NS.  Can replete water PO if/when pt not NPO    2)  Daily BMP to monitor renal function, electrolytes.  Check Mg++ in AM also    3)  Abx as per ID    4)  Will follow    5)  Perhaps PMD can provide information about previous Screat, presence or absence of proteinuria 100

## 2021-04-17 NOTE — PROGRESS NOTE ADULT - SUBJECTIVE AND OBJECTIVE BOX
.  Patient seen & examined with surgery resident and Dr. Mcfadden.   Patient non-verbal but appears comfortable in NAD.         I&O's Detail    2021 07:01  -  2021 07:00  --------------------------------------------------------  IN:    dextrose 5% w/ Additives: 2000 mL    IV PiggyBack: 400 mL    Oral Fluid: 100 mL    Pantoprazole: 230 mL    sodium chloride 0.9%: 225 mL  Total IN: 2955 mL    OUT:    Indwelling Catheter - Urethral (mL): 730 mL  Total OUT: 730 mL    Total NET: 2225 mL      2021 07:01  -  2021 13:46  --------------------------------------------------------  IN:    dextrose 5% w/ Additives: 100 mL    IV PiggyBack: 100 mL    IV PiggyBack: 50 mL    Lactated Ringers: 150 mL    Pantoprazole: 50 mL  Total IN: 450 mL    OUT:    Indwelling Catheter - Urethral (mL): 495 mL  Total OUT: 495 mL    Total NET: -45 mL          MEDICATIONS  (STANDING):  cefTRIAXone   IVPB 2000 milliGRAM(s) IV Intermittent every 24 hours  chlorhexidine 4% Liquid 1 Application(s) Topical <User Schedule>  lactated ringers. 1000 milliLiter(s) (75 mL/Hr) IV Continuous <Continuous>  metoprolol tartrate Injectable 5 milliGRAM(s) IV Push every 6 hours  metroNIDAZOLE  IVPB 500 milliGRAM(s) IV Intermittent every 8 hours  pantoprazole Infusion 8 mG/Hr (10 mL/Hr) IV Continuous <Continuous>    MEDICATIONS  (PRN):  ALPRAZolam 0.25 milliGRAM(s) Oral daily PRN anxiety          Vital Signs Last 24 Hrs  T(C): 36.4 (2021 11:01), Max: 36.4 (2021 07:05)  T(F): 97.6 (2021 11:01), Max: 97.6 (2021 11:00)  HR: 104 (2021 11:00) (82 - 137)  BP: 163/77 (2021 11:00) (91/50 - 163/88)  BP(mean): 110 (2021 11:00) (67 - 114)  RR: 28 (2021 11:01) (11 - 39)  SpO2: 98% (2021 11:00) (79% - 100%)          Physical Exam:  General:  WD, elderly, lying in bed in NAD.   Abdomen:  + Bowel sounds, soft, no distention, no apparent tenderness,  no peritoneal signs.            LABS:                        7.5    11.95 )-----------( 174      ( 2021 05:32 )             23.2     04-17    149<H>  |  111<H>  |  63<HH>  ----------------------------<  148<H>  3.3<L>   |  26  |  2.3<H>    Ca    7.4<L>      2021 05:32  Mg     2.5     04-17    TPro  4.5<L>  /  Alb  2.6<L>  /  TBili  0.6  /  DBili  x   /  AST  156<H>  /  ALT  48<H>  /  AlkPhos  62  04-17        Urinalysis Basic - ( 2021 11:00 )    Color: Yellow / Appearance: Clear / S.020 / pH: x  Gluc: x / Ketone: 15  / Bili: Small / Urobili: 1.0 mg/dL   Blood: x / Protein: >=300 mg/dL / Nitrite: Negative   Leuk Esterase: Negative / RBC: 3-5 /HPF / WBC 1-2 /HPF   Sq Epi: x / Non Sq Epi: Occasional /HPF / Bacteria: Moderate

## 2021-04-17 NOTE — PROGRESS NOTE ADULT - ASSESSMENT
IMPRESSION:  AMS- encephalopathy- 2/2 metabolic- Sepsis&/or hypoglycemia; ? CVA  GI bleed s/p 1 unit of PRBCs;     H/H again dros- Transfuse #2 now; f/u q12h  Possible microperforation- see Ct, GI and Surgery notes- Abd NT  Sepsis  Rhabdomyolysis- CK still high  Acute kidney injury- continue hydration w IV bicarb, and ask Renal to advise  H/o of AFib on xarelto      PLAN:    CNS: avoid depressants; repeat CT- ? any subsequent changes/damage;    HEENT:  Oral care; NPO still    PULMONARY:  HOB @ 45 degrees    CARDIOVASCULAR: Continue IV fluids. Metoprolol to IV     GI:  Continue protonix drip.  Keep NPO.  GI and surgery following.        CBC q12h                              RENAL:  F/u  lytes.  Correct as needed.  accurate I/O    INFECTIOUS DISEASE: Continue rocephin/flagyl. F/u cultures.    HEMATOLOGICAL:  DVT prophylaxis. hold xarelto, SCD.    ENDOCRINE:  Follow up FS.      MUSCULOSKELETAL: bed rest     CODE STATUS: FULL CODE  Condition: serious;   Prognosis: guarded    DISPOSITION: Pt requires continued monitoring in the MICU

## 2021-04-17 NOTE — PROGRESS NOTE ADULT - SUBJECTIVE AND OBJECTIVE BOX
Patient is a 89y old  Female who presents with a chief complaint of Anemia , Afib, leeann, acidosis (17 Apr 2021 07:05)      T(F): 97.5 (04-17-21 @ 07:05), Max: 97.5 (04-17-21 @ 07:05)  HR: 115 (04-17-21 @ 07:00)  BP: 163/88 (04-17-21 @ 07:00)  RR: 16 (04-17-21 @ 07:05)  SpO2: 96% (04-17-21 @ 07:00) (79% - 100%)    PHYSICAL EXAM:  GENERAL: NAD, well-groomed, well-developed  HEAD:  Atraumatic, Normocephalic  EYES: EOMI, PERRLA, conjunctiva and sclera clear  ENMT: No tonsillar erythema, exudates, or enlargement; Moist mucous membranes, Good dentition, No lesions  NECK: Supple, No JVD, Normal thyroid  NERVOUS SYSTEM:  Alert & Oriented X3,  Motor Strength 5/5 B/L upper and lower extremities  CHEST/LUNG: Clear to percussion bilaterally; No rales, rhonchi, wheezing, or rubs  HEART: irreg No murmurs, rubs, or gallops  ABDOMEN: Soft, Nontender, Nondistended; Bowel sounds present  EXTREMITIES:   No clubbing, cyanosis, or edema  LYMPH: No lymphadenopathy noted  SKIN: No rashes or lesions    labs  04-16    142  |  112<H>  |  73<HH>  ----------------------------<  79  4.3   |  15<L>  |  2.4<H>    Ca    7.9<L>      16 Apr 2021 05:36  Mg     2.7     04-15    TPro  5.2<L>  /  Alb  3.0<L>  /  TBili  1.7<H>  /  DBili  x   /  AST  151<H>  /  ALT  46<H>  /  AlkPhos  67  04-15                          7.5    11.95 )-----------( 174      ( 17 Apr 2021 05:32 )             23.2       PT/INR - ( 15 Apr 2021 12:53 )   PT: 29.50 sec;   INR: 2.57 ratio         PTT - ( 15 Apr 2021 12:53 )  PTT:27.2 sec    Troponin T, Serum: 0.06 ng/mL *HH* (04-16-21 @ 11:17)      ALPRAZolam 0.25 milliGRAM(s) Oral daily PRN  cefTRIAXone   IVPB 2000 milliGRAM(s) IV Intermittent every 24 hours  chlorhexidine 4% Liquid 1 Application(s) Topical <User Schedule>  dextrose 5% 1000 milliLiter(s) IV Continuous <Continuous>  metoprolol tartrate 50 milliGRAM(s) Oral two times a day  metroNIDAZOLE  IVPB 500 milliGRAM(s) IV Intermittent every 8 hours  pantoprazole Infusion 8 mG/Hr IV Continuous <Continuous>

## 2021-04-17 NOTE — PROGRESS NOTE ADULT - ASSESSMENT
IMPRESSION:  hypoglycemia  GI bleed s/p 1 unit of PRBCs  Possible microperforation  sepsis  hypotension  Rhabdomyolysis  Acute kidney injury  H/o of AFib on xarelto.      PLAN:    CNS: avoid depressants    HEENT:  Oral care    PULMONARY:  HOB @ 45 degrees    CARDIOVASCULAR: Continue IV fluids.   follow cardiology for rate control   consider lopressors iv Q 6 hrs if ok by cardiology NPO not taking oral meds     GI: GI prophylaxis  ,   protonix drip,   GI f/u.  F/u surgery  and GI                                         NPO     RENAL:  F/u  lytes.   Correct as needed.   accurate I/O    INFECTIOUS DISEASE:  continue rocephin/flagyl.   F/u cultures.    HEMATOLOGICAL:  DVT prophylaxis. hold xarelto, IPCs for now    ENDOCRINE:  Follow up FS.  Insulin protocol if needed.    MUSCULOSKELETAL: bed rest     CODE STATUS: FULL CODE    DISPOSITION: Pt requires continued monitoring in the MICU

## 2021-04-18 LAB
ALBUMIN SERPL ELPH-MCNC: 2.8 G/DL — LOW (ref 3.5–5.2)
ALP SERPL-CCNC: 72 U/L — SIGNIFICANT CHANGE UP (ref 30–115)
ALT FLD-CCNC: 62 U/L — HIGH (ref 0–41)
ANION GAP SERPL CALC-SCNC: 10 MMOL/L — SIGNIFICANT CHANGE UP (ref 7–14)
AST SERPL-CCNC: 212 U/L — HIGH (ref 0–41)
BASOPHILS # BLD AUTO: 0 K/UL — SIGNIFICANT CHANGE UP (ref 0–0.2)
BASOPHILS NFR BLD AUTO: 0 % — SIGNIFICANT CHANGE UP (ref 0–1)
BILIRUB SERPL-MCNC: 0.7 MG/DL — SIGNIFICANT CHANGE UP (ref 0.2–1.2)
BLD GP AB SCN SERPL QL: SIGNIFICANT CHANGE UP
BUN SERPL-MCNC: 49 MG/DL — HIGH (ref 10–20)
CALCIUM SERPL-MCNC: 7.9 MG/DL — LOW (ref 8.5–10.1)
CHLORIDE SERPL-SCNC: 113 MMOL/L — HIGH (ref 98–110)
CK SERPL-CCNC: 8688 U/L — HIGH (ref 0–225)
CO2 SERPL-SCNC: 26 MMOL/L — SIGNIFICANT CHANGE UP (ref 17–32)
CREAT SERPL-MCNC: 1.9 MG/DL — HIGH (ref 0.7–1.5)
EOSINOPHIL # BLD AUTO: 0.08 K/UL — SIGNIFICANT CHANGE UP (ref 0–0.7)
EOSINOPHIL NFR BLD AUTO: 0.7 % — SIGNIFICANT CHANGE UP (ref 0–8)
GLUCOSE SERPL-MCNC: 89 MG/DL — SIGNIFICANT CHANGE UP (ref 70–99)
HCT VFR BLD CALC: 28.3 % — LOW (ref 37–47)
HGB BLD-MCNC: 9 G/DL — LOW (ref 12–16)
IMM GRANULOCYTES NFR BLD AUTO: 0.5 % — HIGH (ref 0.1–0.3)
LYMPHOCYTES # BLD AUTO: 0.87 K/UL — LOW (ref 1.2–3.4)
LYMPHOCYTES # BLD AUTO: 7.9 % — LOW (ref 20.5–51.1)
MAGNESIUM SERPL-MCNC: 2.2 MG/DL — SIGNIFICANT CHANGE UP (ref 1.8–2.4)
MCHC RBC-ENTMCNC: 30.4 PG — SIGNIFICANT CHANGE UP (ref 27–31)
MCHC RBC-ENTMCNC: 31.8 G/DL — LOW (ref 32–37)
MCV RBC AUTO: 95.6 FL — SIGNIFICANT CHANGE UP (ref 81–99)
MONOCYTES # BLD AUTO: 1.18 K/UL — HIGH (ref 0.1–0.6)
MONOCYTES NFR BLD AUTO: 10.7 % — HIGH (ref 1.7–9.3)
NEUTROPHILS # BLD AUTO: 8.82 K/UL — HIGH (ref 1.4–6.5)
NEUTROPHILS NFR BLD AUTO: 80.2 % — HIGH (ref 42.2–75.2)
NRBC # BLD: 0 /100 WBCS — SIGNIFICANT CHANGE UP (ref 0–0)
PLATELET # BLD AUTO: 162 K/UL — SIGNIFICANT CHANGE UP (ref 130–400)
POTASSIUM SERPL-MCNC: 3.5 MMOL/L — SIGNIFICANT CHANGE UP (ref 3.5–5)
POTASSIUM SERPL-SCNC: 3.5 MMOL/L — SIGNIFICANT CHANGE UP (ref 3.5–5)
PROT SERPL-MCNC: 4.8 G/DL — LOW (ref 6–8)
RBC # BLD: 2.96 M/UL — LOW (ref 4.2–5.4)
RBC # FLD: 20.6 % — HIGH (ref 11.5–14.5)
SODIUM SERPL-SCNC: 149 MMOL/L — HIGH (ref 135–146)
WBC # BLD: 11 K/UL — HIGH (ref 4.8–10.8)
WBC # FLD AUTO: 11 K/UL — HIGH (ref 4.8–10.8)

## 2021-04-18 PROCEDURE — 70450 CT HEAD/BRAIN W/O DYE: CPT | Mod: 26

## 2021-04-18 PROCEDURE — 99233 SBSQ HOSP IP/OBS HIGH 50: CPT

## 2021-04-18 RX ORDER — HALOPERIDOL DECANOATE 100 MG/ML
1 INJECTION INTRAMUSCULAR ONCE
Refills: 0 | Status: COMPLETED | OUTPATIENT
Start: 2021-04-18 | End: 2021-04-18

## 2021-04-18 RX ADMIN — SODIUM CHLORIDE 75 MILLILITER(S): 9 INJECTION, SOLUTION INTRAVENOUS at 07:32

## 2021-04-18 RX ADMIN — CEFTRIAXONE 100 MILLIGRAM(S): 500 INJECTION, POWDER, FOR SOLUTION INTRAMUSCULAR; INTRAVENOUS at 11:07

## 2021-04-18 RX ADMIN — Medication 100 MILLIGRAM(S): at 21:20

## 2021-04-18 RX ADMIN — Medication 100 MILLIGRAM(S): at 06:04

## 2021-04-18 RX ADMIN — PANTOPRAZOLE SODIUM 10 MG/HR: 20 TABLET, DELAYED RELEASE ORAL at 07:32

## 2021-04-18 RX ADMIN — PANTOPRAZOLE SODIUM 10 MG/HR: 20 TABLET, DELAYED RELEASE ORAL at 21:34

## 2021-04-18 RX ADMIN — SODIUM CHLORIDE 75 MILLILITER(S): 9 INJECTION, SOLUTION INTRAVENOUS at 21:35

## 2021-04-18 RX ADMIN — CHLORHEXIDINE GLUCONATE 1 APPLICATION(S): 213 SOLUTION TOPICAL at 11:08

## 2021-04-18 RX ADMIN — Medication 5 MILLIGRAM(S): at 17:54

## 2021-04-18 RX ADMIN — Medication 5 MILLIGRAM(S): at 11:07

## 2021-04-18 RX ADMIN — HALOPERIDOL DECANOATE 1 MILLIGRAM(S): 100 INJECTION INTRAMUSCULAR at 11:15

## 2021-04-18 RX ADMIN — Medication 5 MILLIGRAM(S): at 06:04

## 2021-04-18 RX ADMIN — Medication 100 MILLIGRAM(S): at 14:10

## 2021-04-18 RX ADMIN — Medication 0.25 MILLIGRAM(S): at 21:20

## 2021-04-18 NOTE — SWALLOW BEDSIDE ASSESSMENT ADULT - ORAL PHASE
Decreased anterior-posterior movement of the bolus/Delayed oral transit time Delayed oral transit time Within functional limits

## 2021-04-18 NOTE — PROGRESS NOTE ADULT - SUBJECTIVE AND OBJECTIVE BOX
.  Patient seen & examined in ICU.  Patient passed speech and swallow this morning.   Patient awake and confused but does deny abdominal.  Patient denies subjective fever, chills, tremors, N/V/D, CP or SOB.         I&O's Detail    2021 07:01  -  2021 07:00  --------------------------------------------------------  IN:    dextrose 5% w/ Additives: 100 mL    IV PiggyBack: 300 mL    IV PiggyBack: 50 mL    IV PiggyBack: 100 mL    Lactated Ringers: 750 mL    Oral Fluid: 100 mL    Pantoprazole: 240 mL    sodium chloride 0.45%: 825 mL  Total IN: 2465 mL    OUT:    Indwelling Catheter - Urethral (mL): 1489 mL  Total OUT: 1489 mL    Total NET: 976 mL      2021 07:01  -  2021 10:44  --------------------------------------------------------  IN:    Pantoprazole: 20 mL    sodium chloride 0.45%: 150 mL  Total IN: 170 mL    OUT:    Indwelling Catheter - Urethral (mL): 135 mL  Total OUT: 135 mL    Total NET: 35 mL          MEDICATIONS  (STANDING):  cefTRIAXone   IVPB 2000 milliGRAM(s) IV Intermittent every 24 hours  chlorhexidine 4% Liquid 1 Application(s) Topical <User Schedule>  metoprolol tartrate Injectable 5 milliGRAM(s) IV Push every 6 hours  metroNIDAZOLE  IVPB 500 milliGRAM(s) IV Intermittent every 8 hours  pantoprazole Infusion 8 mG/Hr (10 mL/Hr) IV Continuous <Continuous>  sodium chloride 0.45%. 1000 milliLiter(s) (75 mL/Hr) IV Continuous <Continuous>    MEDICATIONS  (PRN):  ALPRAZolam 0.25 milliGRAM(s) Oral daily PRN anxiety  haloperidol    Injectable 1 milliGRAM(s) IV Push once PRN agitation          Vital Signs Last 24 Hrs  T(C): 36.3 (2021 07:05), Max: 36.6 (2021 19:00)  T(F): 97.3 (2021 07:05), Max: 97.9 (2021 19:00)  HR: 90 (2021 07:00) (83 - 110)  BP: 144/116 (2021 07:00) (93/54 - 188/102)  BP(mean): 125 (2021 07:00) (68 - 146)  RR: 25 (2021 07:05) (12 - 30)  SpO2: 95% (2021 07:00) (92% - 98%)          Physical Exam:  General:  Conversant in NAD.   Abdomen:  + Bowel sounds, soft, no distention, non-tender,  no rebound/guarding or peritoneal signs.            LABS:                          9.0    11.00 )-----------( 162      ( 2021 05:31 )              28.3     04-18    149<H>  |  113<H>  |  49<H>  ----------------------------<  89  3.5   |  26  |  1.9<H>    Ca    7.9<L>      2021 05:31  Mg     2.2     04-18    TPro  4.8<L>  /  Alb  2.8<L>  /  TBili  0.7  /  DBili  x   /  AST  212<H>  /  ALT  62<H>  /  AlkPhos  72  04-18          Urinalysis Basic - ( 2021 11:00 )    Color: Yellow / Appearance: Clear / S.020 / pH: x  Gluc: x / Ketone: 15  / Bili: Small / Urobili: 1.0 mg/dL   Blood: x / Protein: >=300 mg/dL / Nitrite: Negative   Leuk Esterase: Negative / RBC: 3-5 /HPF / WBC 1-2 /HPF   Sq Epi: x / Non Sq Epi: Occasional /HPF / Bacteria: Moderate

## 2021-04-18 NOTE — SWALLOW BEDSIDE ASSESSMENT ADULT - SWALLOW EVAL: SECRETION MANAGEMENT
Per RN, patient with baseline congestive cough. Vocal quality is clear prior to initiating PO trials./baseline cough

## 2021-04-18 NOTE — PROGRESS NOTE ADULT - SUBJECTIVE AND OBJECTIVE BOX
Patient is a 89y old  Female who presents with a chief complaint of Anemia , Afib, leeann, acidosis (2021 22:28)      Over Night Events:  Patient seen and examined.   agitated bP stable     ROS:  See HPI    PHYSICAL EXAM    ICU Vital Signs Last 24 Hrs  T(C): 36.4 (2021 03:00), Max: 36.6 (2021 19:00)  T(F): 97.5 (2021 03:00), Max: 97.9 (2021 19:00)  HR: 86 (2021 06:38) (83 - 110)  BP: 157/74 (2021 06:38) (93/54 - 188/102)  BP(mean): 146 (2021 05:57) (68 - 146)  ABP: --  ABP(mean): --  RR: 16 (2021 06:38) (12 - 30)  SpO2: 95% (2021 06:38) (92% - 98%)      General:awake confused   HEENT:   [place]             Lymph Nodes: NO cervical LN   Lungs: Bilateral BS  Cardiovascular: Regular   Abdomen: Soft, Positive BS  Extremities: No clubbing   Skin: warm   Neurological: no focal   Musculoskeletal: move all ext     I&O's Detail    2021 07:01  -  2021 07:00  --------------------------------------------------------  IN:    dextrose 5% w/ Additives: 100 mL    IV PiggyBack: 300 mL    IV PiggyBack: 50 mL    IV PiggyBack: 100 mL    Lactated Ringers: 750 mL    Oral Fluid: 100 mL    Pantoprazole: 240 mL    sodium chloride 0.45%: 825 mL  Total IN: 2465 mL    OUT:    Indwelling Catheter - Urethral (mL): 1489 mL  Total OUT: 1489 mL    Total NET: 976 mL          LABS:                          9.0    11.00 )-----------( 162      ( 2021 05:31 )             28.3         2021 05:32    149    |  111    |  63     ----------------------------<  148    3.3     |  26     |  2.3      Ca    7.4        2021 05:32  Mg     2.5       2021 05:32                                                                                      Urinalysis Basic - ( 2021 11:00 )    Color: Yellow / Appearance: Clear / S.020 / pH: x  Gluc: x / Ketone: 15  / Bili: Small / Urobili: 1.0 mg/dL   Blood: x / Protein: >=300 mg/dL / Nitrite: Negative   Leuk Esterase: Negative / RBC: 3-5 /HPF / WBC 1-2 /HPF   Sq Epi: x / Non Sq Epi: Occasional /HPF / Bacteria: Moderate        Lactate, Blood: 1.3 mmol/L (21 @ 05:36)  Lactate, Blood: 3.1 mmol/L (04-15-21 @ 22:02)    CARDIAC MARKERS ( 2021 05:32 )  x     / x     / 7080 U/L / x     / x      CARDIAC MARKERS ( 2021 11:17 )  x     / 0.06 ng/mL / x     / x     / x                                                            Culture - Blood (collected 2021 11:17)  Source: .Blood None  Preliminary Report (2021 23:01):    No growth to date.    Culture - Urine (collected 2021 11:00)  Source: .Urine Clean Catch (Midstream)  Final Report (2021 18:53):    No growth                                                                                           MEDICATIONS  (STANDING):  cefTRIAXone   IVPB 2000 milliGRAM(s) IV Intermittent every 24 hours  chlorhexidine 4% Liquid 1 Application(s) Topical <User Schedule>  metoprolol tartrate Injectable 5 milliGRAM(s) IV Push every 6 hours  metroNIDAZOLE  IVPB 500 milliGRAM(s) IV Intermittent every 8 hours  pantoprazole Infusion 8 mG/Hr (10 mL/Hr) IV Continuous <Continuous>  sodium chloride 0.45%. 1000 milliLiter(s) (75 mL/Hr) IV Continuous <Continuous>    MEDICATIONS  (PRN):  ALPRAZolam 0.25 milliGRAM(s) Oral daily PRN anxiety          Xrays:  TLC:  OG:  ET tube:                                                                                       ECHO:  CAM ICU:

## 2021-04-18 NOTE — CHART NOTE - NSCHARTNOTEFT_GEN_A_CORE
spoke to daughter Wayne at the bedside with Junaid the son over the phone.  updated and all questions answered.  family would like to gets daily updates on this number [phone] (house number for [facility] the )

## 2021-04-18 NOTE — PROGRESS NOTE ADULT - ASSESSMENT
Impression:  88 y/o female with CT scan findings of punctate areas of abdominal free air.       Plan:  - Continue present management per ICU.   - Case discussed with Dr. Correa and recommends continue present conservative mgt. , continue with Ivabx and can advance diet to clears as tolerated.  Recommendations d/w ICU resident.

## 2021-04-18 NOTE — PROGRESS NOTE ADULT - ASSESSMENT
Patient ms better. Check cbc lytes. Speech swallow to see. If able oob to chair. Check cxr. Correct k if needed

## 2021-04-18 NOTE — PROGRESS NOTE ADULT - SUBJECTIVE AND OBJECTIVE BOX
Patient is a 89y old  Female who presents with a chief complaint of Anemia , Afib, leeann, acidosis (18 Apr 2021 07:06)      T(F): 97.5 (04-18-21 @ 03:00), Max: 97.9 (04-17-21 @ 19:00)  HR: 86 (04-18-21 @ 06:38)  BP: 157/74 (04-18-21 @ 06:38)  RR: 16 (04-18-21 @ 06:38)  SpO2: 95% (04-18-21 @ 06:38) (92% - 98%)    PHYSICAL EXAM:  GENERAL: NAD, well-groomed, well-developed  HEAD:  Atraumatic, Normocephalic  EYES: EOMI, PERRLA, conjunctiva and sclera clear  ENMT: No tonsillar erythema, exudates, or enlargement; Moist mucous membranes, Good dentition, No lesions  NECK: Supple, No JVD, Normal thyroid  NERVOUS SYSTEM:  Alert & Oriented X3,  Motor Strength 5/5 B/L upper and lower extremities  CHEST/LUNG: Clear to percussion bilaterally; No rales, rhonchi, wheezing, or rubs  HEART: Regular rate and rhythm; No murmurs, rubs, or gallops  ABDOMEN: Soft, Nontender, Nondistended; Bowel sounds present  EXTREMITIES:   No clubbing, cyanosis, or edema  LYMPH: No lymphadenopathy noted  SKIN: No rashes or lesions    labs  04-17    149<H>  |  111<H>  |  63<HH>  ----------------------------<  148<H>  3.3<L>   |  26  |  2.3<H>    Ca    7.4<L>      17 Apr 2021 05:32  Mg     2.5     04-17    TPro  4.5<L>  /  Alb  2.6<L>  /  TBili  0.6  /  DBili  x   /  AST  156<H>  /  ALT  48<H>  /  AlkPhos  62  04-17                          9.0    11.00 )-----------( 162      ( 18 Apr 2021 05:31 )             28.3       Culture - Blood (collected 16 Apr 2021 11:17)  Source: .Blood None  Preliminary Report (17 Apr 2021 23:01):    No growth to date.    Culture - Urine (collected 16 Apr 2021 11:00)  Source: .Urine Clean Catch (Midstream)  Final Report (17 Apr 2021 18:53):    No growth              ALPRAZolam 0.25 milliGRAM(s) Oral daily PRN  cefTRIAXone   IVPB 2000 milliGRAM(s) IV Intermittent every 24 hours  chlorhexidine 4% Liquid 1 Application(s) Topical <User Schedule>  metoprolol tartrate Injectable 5 milliGRAM(s) IV Push every 6 hours  metroNIDAZOLE  IVPB 500 milliGRAM(s) IV Intermittent every 8 hours  pantoprazole Infusion 8 mG/Hr IV Continuous <Continuous>  sodium chloride 0.45%. 1000 milliLiter(s) IV Continuous <Continuous>

## 2021-04-18 NOTE — PROGRESS NOTE ADULT - ASSESSMENT
IMPRESSION:  hypoglycemia  GI bleed s/p 1 unit of PRBCs  Possible microperforation  sepsis  hypotension  Rhabdomyolysis  Acute kidney injury  H/o of AFib on xarelto.      PLAN:    CNS: avoid depressants    HEENT:  Oral care    PULMONARY:  HOB @ 45 degrees    CARDIOVASCULAR: Continue IV fluids.   follow cardiology for rate control   lopressors 2.5  iv Q 68hrs if ok by cardiology while NPO     GI: GI prophylaxis  ,   protonix drip,   GI f/u.  F/u surgery  and GI  if can start feed                                   NPO     RENAL:  F/u  lytes.   Correct as needed.   accurate I/O    INFECTIOUS DISEASE:  continue rocephin/flagyl.   F/u cultures.    HEMATOLOGICAL:  DVT prophylaxis. hold xarelto, IPCs for now    ENDOCRINE:  Follow up FS.  Insulin protocol if needed.    MUSCULOSKELETAL: bed rest     CODE STATUS: FULL CODE    DISPOSITION: Pt requires continued monitoring in the MICU

## 2021-04-18 NOTE — SWALLOW BEDSIDE ASSESSMENT ADULT - PHARYNGEAL PHASE
Patient with + toleration observed without overt symptoms of penetration/aspiration of thin liquids via teaspoon. Patient noted with immediate cough response following trials via single cup sips from clinician and sequential cup sips when patient self-fed./Cough post oral intake + toleration observed without overt symptoms of penetration/aspiration when patient fed by clinician to ensure small cup sips. Patient observed with immediate cough response when patient self-fed and consume sequential cup sips. + toleration observed without overt symptoms of penetration/aspiration

## 2021-04-18 NOTE — PROGRESS NOTE ADULT - ASSESSMENT
IMPRESSION:  AMS- encephalopathy- 2/2 metabolic- Sepsis&/or hypoglycemia; ? CVA  GI bleed s/p 1 unit of PRBCs;     H/H again dros- Transfuse #2 now; f/u q12h  Possible microperforation- see Ct, GI and Surgery notes- Abd NT  Sepsis  Rhabdomyolysis- CK still high  Acute kidney injury- continue hydration w IV bicarb, and ask Renal to advise  H/o of AFib on xarelto      PLAN:    CNS: avoid depressants; repeat CT- ? any subsequent changes/damage;  PLEASE ORDER     D/W HO ON 4/17    HEENT:  Oral care; NPO still; SLP to see    PULMONARY:  HOB @ 45 degrees    CARDIOVASCULAR: Continue IV fluids. Metoprolol to IV     GI:  Continue protonix drip.  Keep NPO.  GI and surgery following.        CBC q12h ; clear w surgery to give PO intake                             RENAL:  F/u  lytes.  Correct as needed.  accurate I/O    INFECTIOUS DISEASE: Continue rocephin/flagyl. F/u cultures.    HEMATOLOGICAL:  DVT prophylaxis. hold xarelto, SCD.     H/H stable; consider restart of AC at D7-14    ENDOCRINE:  Follow up FS.      MUSCULOSKELETAL: bed rest     CODE STATUS: FULL CODE  Condition: serious;   Prognosis: guarded    DISPOSITION:  continued monitoring in the MICU--as per CC team

## 2021-04-19 LAB
ALBUMIN SERPL ELPH-MCNC: 2.6 G/DL — LOW (ref 3.5–5.2)
ALP SERPL-CCNC: 64 U/L — SIGNIFICANT CHANGE UP (ref 30–115)
ALT FLD-CCNC: 65 U/L — HIGH (ref 0–41)
ANION GAP SERPL CALC-SCNC: 11 MMOL/L — SIGNIFICANT CHANGE UP (ref 7–14)
APPEARANCE UR: CLEAR — SIGNIFICANT CHANGE UP
AST SERPL-CCNC: 199 U/L — HIGH (ref 0–41)
BACTERIA # UR AUTO: ABNORMAL
BASOPHILS # BLD AUTO: 0.01 K/UL — SIGNIFICANT CHANGE UP (ref 0–0.2)
BASOPHILS NFR BLD AUTO: 0.1 % — SIGNIFICANT CHANGE UP (ref 0–1)
BILIRUB SERPL-MCNC: 0.7 MG/DL — SIGNIFICANT CHANGE UP (ref 0.2–1.2)
BILIRUB UR-MCNC: NEGATIVE — SIGNIFICANT CHANGE UP
BUN SERPL-MCNC: 37 MG/DL — HIGH (ref 10–20)
CALCIUM SERPL-MCNC: 7.7 MG/DL — LOW (ref 8.5–10.1)
CHLORIDE SERPL-SCNC: 109 MMOL/L — SIGNIFICANT CHANGE UP (ref 98–110)
CK SERPL-CCNC: 6925 U/L — HIGH (ref 0–225)
CO2 SERPL-SCNC: 23 MMOL/L — SIGNIFICANT CHANGE UP (ref 17–32)
COD CRY URNS QL: NEGATIVE — SIGNIFICANT CHANGE UP
COLOR SPEC: YELLOW — SIGNIFICANT CHANGE UP
CREAT ?TM UR-MCNC: 104 MG/DL — SIGNIFICANT CHANGE UP
CREAT SERPL-MCNC: 1.6 MG/DL — HIGH (ref 0.7–1.5)
DIFF PNL FLD: ABNORMAL
EOSINOPHIL # BLD AUTO: 0.15 K/UL — SIGNIFICANT CHANGE UP (ref 0–0.7)
EOSINOPHIL # BLD AUTO: 0.15 K/UL — SIGNIFICANT CHANGE UP (ref 0–0.7)
EOSINOPHIL # BLD AUTO: 0.21 K/UL — SIGNIFICANT CHANGE UP (ref 0–0.7)
EOSINOPHIL NFR BLD AUTO: 1.4 % — SIGNIFICANT CHANGE UP (ref 0–8)
EOSINOPHIL NFR BLD AUTO: 1.4 % — SIGNIFICANT CHANGE UP (ref 0–8)
EOSINOPHIL NFR BLD AUTO: 2.1 % — SIGNIFICANT CHANGE UP (ref 0–8)
EPI CELLS # UR: ABNORMAL /HPF
GLUCOSE SERPL-MCNC: 82 MG/DL — SIGNIFICANT CHANGE UP (ref 70–99)
GLUCOSE UR QL: NEGATIVE MG/DL — SIGNIFICANT CHANGE UP
GRAN CASTS # UR COMP ASSIST: ABNORMAL /LPF
HCT VFR BLD CALC: 28.2 % — LOW (ref 37–47)
HCT VFR BLD CALC: 29 % — LOW (ref 37–47)
HCT VFR BLD CALC: 30.3 % — LOW (ref 37–47)
HGB BLD-MCNC: 8.8 G/DL — LOW (ref 12–16)
HGB BLD-MCNC: 9.1 G/DL — LOW (ref 12–16)
HGB BLD-MCNC: 9.7 G/DL — LOW (ref 12–16)
HYALINE CASTS # UR AUTO: ABNORMAL /LPF
IMM GRANULOCYTES NFR BLD AUTO: 0.5 % — HIGH (ref 0.1–0.3)
IMM GRANULOCYTES NFR BLD AUTO: 0.6 % — HIGH (ref 0.1–0.3)
IMM GRANULOCYTES NFR BLD AUTO: 0.7 % — HIGH (ref 0.1–0.3)
KETONES UR-MCNC: NEGATIVE — SIGNIFICANT CHANGE UP
LEUKOCYTE ESTERASE UR-ACNC: NEGATIVE — SIGNIFICANT CHANGE UP
LYMPHOCYTES # BLD AUTO: 0.76 K/UL — LOW (ref 1.2–3.4)
LYMPHOCYTES # BLD AUTO: 0.83 K/UL — LOW (ref 1.2–3.4)
LYMPHOCYTES # BLD AUTO: 0.84 K/UL — LOW (ref 1.2–3.4)
LYMPHOCYTES # BLD AUTO: 7.6 % — LOW (ref 20.5–51.1)
LYMPHOCYTES # BLD AUTO: 7.7 % — LOW (ref 20.5–51.1)
LYMPHOCYTES # BLD AUTO: 7.8 % — LOW (ref 20.5–51.1)
MAGNESIUM SERPL-MCNC: 1.9 MG/DL — SIGNIFICANT CHANGE UP (ref 1.8–2.4)
MCHC RBC-ENTMCNC: 29.8 PG — SIGNIFICANT CHANGE UP (ref 27–31)
MCHC RBC-ENTMCNC: 30.1 PG — SIGNIFICANT CHANGE UP (ref 27–31)
MCHC RBC-ENTMCNC: 30.6 PG — SIGNIFICANT CHANGE UP (ref 27–31)
MCHC RBC-ENTMCNC: 31.2 G/DL — LOW (ref 32–37)
MCHC RBC-ENTMCNC: 31.4 G/DL — LOW (ref 32–37)
MCHC RBC-ENTMCNC: 32 G/DL — SIGNIFICANT CHANGE UP (ref 32–37)
MCV RBC AUTO: 95.1 FL — SIGNIFICANT CHANGE UP (ref 81–99)
MCV RBC AUTO: 95.6 FL — SIGNIFICANT CHANGE UP (ref 81–99)
MCV RBC AUTO: 96.6 FL — SIGNIFICANT CHANGE UP (ref 81–99)
MONOCYTES # BLD AUTO: 1.04 K/UL — HIGH (ref 0.1–0.6)
MONOCYTES # BLD AUTO: 1.16 K/UL — HIGH (ref 0.1–0.6)
MONOCYTES # BLD AUTO: 1.51 K/UL — HIGH (ref 0.1–0.6)
MONOCYTES NFR BLD AUTO: 10.6 % — HIGH (ref 1.7–9.3)
MONOCYTES NFR BLD AUTO: 11 % — HIGH (ref 1.7–9.3)
MONOCYTES NFR BLD AUTO: 13.6 % — HIGH (ref 1.7–9.3)
NEUTROPHILS # BLD AUTO: 7.75 K/UL — HIGH (ref 1.4–6.5)
NEUTROPHILS # BLD AUTO: 8.37 K/UL — HIGH (ref 1.4–6.5)
NEUTROPHILS # BLD AUTO: 8.48 K/UL — HIGH (ref 1.4–6.5)
NEUTROPHILS NFR BLD AUTO: 76.6 % — HIGH (ref 42.2–75.2)
NEUTROPHILS NFR BLD AUTO: 79 % — HIGH (ref 42.2–75.2)
NEUTROPHILS NFR BLD AUTO: 79.1 % — HIGH (ref 42.2–75.2)
NITRITE UR-MCNC: NEGATIVE — SIGNIFICANT CHANGE UP
NRBC # BLD: 0 /100 WBCS — SIGNIFICANT CHANGE UP (ref 0–0)
PH UR: 7 — SIGNIFICANT CHANGE UP (ref 5–8)
PLATELET # BLD AUTO: 155 K/UL — SIGNIFICANT CHANGE UP (ref 130–400)
PLATELET # BLD AUTO: 179 K/UL — SIGNIFICANT CHANGE UP (ref 130–400)
PLATELET # BLD AUTO: 183 K/UL — SIGNIFICANT CHANGE UP (ref 130–400)
POTASSIUM SERPL-MCNC: 3.1 MMOL/L — LOW (ref 3.5–5)
POTASSIUM SERPL-SCNC: 3.1 MMOL/L — LOW (ref 3.5–5)
PROT ?TM UR-MCNC: 97 MG/DLG/24H — SIGNIFICANT CHANGE UP
PROT SERPL-MCNC: 4.5 G/DL — LOW (ref 6–8)
PROT UR-MCNC: >=300 MG/DL
PROT/CREAT UR-RTO: 0.9 RATIO — HIGH (ref 0–0.2)
RBC # BLD: 2.92 M/UL — LOW (ref 4.2–5.4)
RBC # BLD: 3.05 M/UL — LOW (ref 4.2–5.4)
RBC # BLD: 3.17 M/UL — LOW (ref 4.2–5.4)
RBC # FLD: 19.5 % — HIGH (ref 11.5–14.5)
RBC # FLD: 19.7 % — HIGH (ref 11.5–14.5)
RBC # FLD: 19.9 % — HIGH (ref 11.5–14.5)
RBC CASTS # UR COMP ASSIST: ABNORMAL /HPF
SODIUM SERPL-SCNC: 143 MMOL/L — SIGNIFICANT CHANGE UP (ref 135–146)
SP GR SPEC: 1.02 — SIGNIFICANT CHANGE UP (ref 1.01–1.03)
TRI-PHOS CRY UR QL COMP ASSIST: NEGATIVE — SIGNIFICANT CHANGE UP
URATE CRY FLD QL MICRO: NEGATIVE — SIGNIFICANT CHANGE UP
UROBILINOGEN FLD QL: 1 MG/DL (ref 0.2–0.2)
WBC # BLD: 10.58 K/UL — SIGNIFICANT CHANGE UP (ref 4.8–10.8)
WBC # BLD: 11.07 K/UL — HIGH (ref 4.8–10.8)
WBC # BLD: 9.82 K/UL — SIGNIFICANT CHANGE UP (ref 4.8–10.8)
WBC # FLD AUTO: 10.58 K/UL — SIGNIFICANT CHANGE UP (ref 4.8–10.8)
WBC # FLD AUTO: 11.07 K/UL — HIGH (ref 4.8–10.8)
WBC # FLD AUTO: 9.82 K/UL — SIGNIFICANT CHANGE UP (ref 4.8–10.8)
WBC UR QL: SIGNIFICANT CHANGE UP /HPF

## 2021-04-19 PROCEDURE — 99233 SBSQ HOSP IP/OBS HIGH 50: CPT

## 2021-04-19 PROCEDURE — 99232 SBSQ HOSP IP/OBS MODERATE 35: CPT

## 2021-04-19 PROCEDURE — 71045 X-RAY EXAM CHEST 1 VIEW: CPT | Mod: 26

## 2021-04-19 PROCEDURE — 76705 ECHO EXAM OF ABDOMEN: CPT | Mod: 26

## 2021-04-19 RX ORDER — POTASSIUM CHLORIDE 20 MEQ
40 PACKET (EA) ORAL EVERY 4 HOURS
Refills: 0 | Status: COMPLETED | OUTPATIENT
Start: 2021-04-19 | End: 2021-04-19

## 2021-04-19 RX ORDER — SODIUM CHLORIDE 9 MG/ML
1000 INJECTION, SOLUTION INTRAVENOUS
Refills: 0 | Status: DISCONTINUED | OUTPATIENT
Start: 2021-04-19 | End: 2021-04-19

## 2021-04-19 RX ORDER — METOPROLOL TARTRATE 50 MG
25 TABLET ORAL
Refills: 0 | Status: DISCONTINUED | OUTPATIENT
Start: 2021-04-19 | End: 2021-04-22

## 2021-04-19 RX ADMIN — PANTOPRAZOLE SODIUM 10 MG/HR: 20 TABLET, DELAYED RELEASE ORAL at 09:30

## 2021-04-19 RX ADMIN — Medication 100 MILLIGRAM(S): at 05:56

## 2021-04-19 RX ADMIN — Medication 25 MILLIGRAM(S): at 15:51

## 2021-04-19 RX ADMIN — CHLORHEXIDINE GLUCONATE 1 APPLICATION(S): 213 SOLUTION TOPICAL at 09:47

## 2021-04-19 RX ADMIN — Medication 40 MILLIEQUIVALENT(S): at 09:46

## 2021-04-19 RX ADMIN — PANTOPRAZOLE SODIUM 10 MG/HR: 20 TABLET, DELAYED RELEASE ORAL at 21:36

## 2021-04-19 RX ADMIN — Medication 100 MILLIGRAM(S): at 13:34

## 2021-04-19 RX ADMIN — Medication 5 MILLIGRAM(S): at 05:56

## 2021-04-19 RX ADMIN — Medication 40 MILLIEQUIVALENT(S): at 13:34

## 2021-04-19 RX ADMIN — Medication 5 MILLIGRAM(S): at 01:25

## 2021-04-19 RX ADMIN — SODIUM CHLORIDE 50 MILLILITER(S): 9 INJECTION, SOLUTION INTRAVENOUS at 09:46

## 2021-04-19 RX ADMIN — CEFTRIAXONE 100 MILLIGRAM(S): 500 INJECTION, POWDER, FOR SOLUTION INTRAMUSCULAR; INTRAVENOUS at 11:13

## 2021-04-19 RX ADMIN — Medication 100 MILLIGRAM(S): at 21:37

## 2021-04-19 NOTE — PROGRESS NOTE ADULT - ASSESSMENT
IMPRESSION:  hypoglycemia  GI bleed s/p 1 unit of PRBCs  Possible microperforation  sepsis  hypotension  Rhabdomyolysis  Acute kidney injury  H/o of AFib on xarelto.      PLAN:    CNS: avoid depressants    HEENT:  Oral care    PULMONARY:  HOB @ 45 degrees    CARDIOVASCULAR: d/c iv fluid in 10 hrs oral feed   resume metoprolol home dose     GI: GI prophylaxis  ,   protonix drip,  speech and swallow feeding   follow Gi and GSx     RENAL:  F/u  lytes. d/c huggins   replace K   Correct as needed.   accurate I/O    INFECTIOUS DISEASE:  continue rocephin/flagyl.   F/u cultures.    HEMATOLOGICAL:  DVT prophylaxis. off xarelto for Gi bleed   follow with Gi if can resume AC or at least for dvt prophylaxis I    ENDOCRINE:  Follow up FS.  Insulin protocol if needed.    MUSCULOSKELETAL: bed rest     CODE STATUS: FULL CODE  tele monitoring for 24 hrs

## 2021-04-19 NOTE — PROGRESS NOTE ADULT - ASSESSMENT
IMPRESSION:  Hypoglycemia - RESOLVED   GI bleed s/p 2 unit of PRBCs total  Possible microperforation  Sepsis  Rhabdomyolysis  Acute kidney injury - Improving   H/o of AFib on xarelto      PLAN:    CNS: avoid depressants    HEENT:  Oral care    PULMONARY:  HOB @ 45 degrees    CARDIOVASCULAR: Continue hypotonic fluids 1/2 NS 50 cc/hr (D/C at 7 pm). Strict I&Os.    GI:  Continue protonix drip.  f/u GI regarding anticoagulation.  Tolerating PO diet. Seen by speech and swallow.                               RENAL:  F/u  lytes.  Correct as needed.  Nephrology following.     INFECTIOUS DISEASE: Continue rocephin/flagyl. F/u cultures.  ID following.     HEMATOLOGICAL:  hold xarelto, SCD.    ENDOCRINE:  Follow up FS.      MUSCULOSKELETAL: increase activity as tolerated    CODE STATUS: FULL CODE.   Acute, from home. Lives along.   Downgrade to telemetry.    IMPRESSION:  Hypoglycemia - RESOLVED   GI bleed s/p 2 unit of PRBCs total  Possible microperforation  Sepsis  Rhabdomyolysis  Acute kidney injury - Improving   H/o of AFib on xarelto      PLAN:    CNS: avoid depressants    HEENT:  Oral care    PULMONARY:  HOB @ 45 degrees    CARDIOVASCULAR: Continue hypotonic fluids 1/2 NS 50 cc/hr (D/C at 7 pm). Strict I&Os.    GI:  Continue protonix drip.  f/u GI regarding anticoagulation.  Tolerating PO diet. Seen by speech and swallow.                               RENAL:  F/u  lytes.  Correct as needed.  Nephrology following.     INFECTIOUS DISEASE: Continue rocephin/flagyl. F/u cultures.  ID following.     HEMATOLOGICAL:  hold xarelto, SCD.    ENDOCRINE:  Follow up FS.      MUSCULOSKELETAL: increase activity as tolerated    CODE STATUS: FULL CODE.   Acute, from home. Spoke with granddaughter on 4/19 and updated her on patients condition. All questions answered.   Downgrade to telemetry.

## 2021-04-19 NOTE — PROGRESS NOTE ADULT - SUBJECTIVE AND OBJECTIVE BOX
[place]  89y  Female      Patient is a 89y old  Female who presents with a chief complaint of Anemia , Afib, tyrese, acidosis (19 Apr 2021 07:32)        REVIEW OF SYSTEMS:  CONSTITUTIONAL: No fever, weight loss, or fatigue  EYES: No eye pain, visual disturbances, or discharge  ENMT:  No difficulty hearing, tinnitus, vertigo; No sinus or throat pain  NECK: No pain or stiffness  BREASTS: No pain, masses, or nipple discharge  RESPIRATORY: No cough, wheezing, chills or hemoptysis; No shortness of breath  CARDIOVASCULAR: No chest pain, palpitations, dizziness, or leg swelling  GASTROINTESTINAL: No abdominal or epigastric pain. No nausea, vomiting, or hematemesis; No diarrhea or constipation. No melena or hematochezia.  GENITOURINARY: No dysuria, frequency, hematuria, or incontinence  NEUROLOGICAL: No headaches, memory loss, loss of strength, numbness, or tremors  SKIN: No itching, burning, rashes, or lesions   MUSCULOSKELETAL: No joint pain or swelling; No muscle, back, or extremity pain  PSYCHIATRIC: No depression, anxiety, mood swings, or difficulty sleeping  HEME/LYMPH: No easy bruising, or bleeding gums  ALLERY AND IMMUNOLOGIC: No hives or eczema  FAMILY HISTORY:  No pertinent family history in first degree relatives      T(C): 35.6 (04-19-21 @ 07:01), Max: 36.7 (04-18-21 @ 11:00)  HR: 86 (04-19-21 @ 05:55) (70 - 103)  BP: 170/70 (04-19-21 @ 05:55) (100/52 - 170/70)  RR: 18 (04-19-21 @ 07:01) (12 - 28)  SpO2: 96% (04-19-21 @ 01:00) (95% - 98%)  Wt(kg): --Vital Signs Last 24 Hrs  T(C): 35.6 (19 Apr 2021 07:01), Max: 36.7 (18 Apr 2021 11:00)  T(F): 96 (19 Apr 2021 07:01), Max: 98.1 (18 Apr 2021 11:00)  HR: 86 (19 Apr 2021 05:55) (70 - 103)  BP: 170/70 (19 Apr 2021 05:55) (100/52 - 170/70)  BP(mean): 101 (19 Apr 2021 05:55) (74 - 124)  RR: 18 (19 Apr 2021 07:01) (12 - 28)  SpO2: 96% (19 Apr 2021 01:00) (95% - 98%)  No Known Allergies      PHYSICAL EXAM:  GENERAL: NAD,   HEAD:  Atraumatic, Normocephalic  EYES: EOMI, PERRLA, conjunctiva and sclera clear  ENMT: No tonsillar erythema, exudates, or enlargement;  NECK: Supple, No JVD, Normal thyroid  NERVOUS SYSTEM:  Alert & Oriented   CHEST/LUNG: vbs slight decreased breath sound  HEART: Regular rate and rhythm; No murmurs, rubs, or gallops  ABDOMEN: Soft, Nontender, Nondistended; Bowel sounds present  EXTREMITIES:  , No clubbing, cyanosis, or edema  LYMPH: No lymphadenopathy noted  SKIN: No rashes or lesions      LABS:  04-19    143  |  109  |  37<H>  ----------------------------<  82  3.1<L>   |  23  |  1.6<H>    Ca    7.7<L>      19 Apr 2021 05:36  Mg     1.9     04-19    TPro  4.5<L>  /  Alb  2.6<L>  /  TBili  0.7  /  DBili  x   /  AST  199<H>  /  ALT  65<H>  /  AlkPhos  64  04-19                            8.8    9.82  )-----------( 155      ( 19 Apr 2021 05:36 )             28.2       RADIOLOGY & ADDITIONAL TESTS:    MEDICATION:  ALPRAZolam 0.25 milliGRAM(s) Oral daily PRN  cefTRIAXone   IVPB 2000 milliGRAM(s) IV Intermittent every 24 hours  chlorhexidine 4% Liquid 1 Application(s) Topical <User Schedule>  metoprolol tartrate Injectable 5 milliGRAM(s) IV Push every 6 hours  metroNIDAZOLE  IVPB 500 milliGRAM(s) IV Intermittent every 8 hours  pantoprazole Infusion 8 mG/Hr IV Continuous <Continuous>  potassium chloride   Powder 40 milliEquivalent(s) Oral every 4 hours  sodium chloride 0.45%. 1000 milliLiter(s) IV Continuous <Continuous>      HEALTH ISSUES - PROBLEM Dx:    perforated bowel start on clear liquid to full liquid,rocephin,metronidazole  TYRESE improving with iv hydration  anemia acute blood loss s/p 2 units prbc,gi bleeding on protonix fu for egd due to perforation  hx a fib hold xaelto,metoprolol  generalized weakness lives alone pt/ot  hypokelemia replace kcl

## 2021-04-19 NOTE — PROGRESS NOTE ADULT - ASSESSMENT
1) Fletcher on CKD 3 (most likely)  - Atrophic Lt kidney, no hydro  -creat has improved significantly  - cont hypotonic fluids 1/2 NS 50 cc/hr; replace K - KCL 40 x1 or 20 IV x1    2)  Proteinuria on U/A please repeat UA and protein/creat ratio    will follow

## 2021-04-19 NOTE — PROGRESS NOTE ADULT - ASSESSMENT
Patient ms better. Minimally confused. Check cbc lytes. Speech swallow to see. If able oob to chair. Check cxr. Correct k if needed. Medical rx for now. Out patient GI W/U when able. Recheck LFT. physician/consult arrival

## 2021-04-19 NOTE — PHYSICAL THERAPY INITIAL EVALUATION ADULT - ADDITIONAL COMMENTS
Per patient, has 7-8 steps with bilateral handrails to enter home; then another flight of steps with bilateral handrails to bedroom; was taking bus to get around and using no assistive device

## 2021-04-19 NOTE — PROGRESS NOTE ADULT - SUBJECTIVE AND OBJECTIVE BOX
Nephrology progress note    Patient is seen and examined, events over the last 24 h noted .  Feels better. Making urine.  Allergies:  No Known Allergies    Hospital Medications:   MEDICATIONS  (STANDING):  cefTRIAXone   IVPB 2000 milliGRAM(s) IV Intermittent every 24 hours  chlorhexidine 4% Liquid 1 Application(s) Topical <User Schedule>  metoprolol tartrate 25 milliGRAM(s) Oral two times a day  metroNIDAZOLE  IVPB 500 milliGRAM(s) IV Intermittent every 8 hours  pantoprazole Infusion 8 mG/Hr (10 mL/Hr) IV Continuous <Continuous>  potassium chloride   Powder 40 milliEquivalent(s) Oral every 4 hours  sodium chloride 0.45%. 1000 milliLiter(s) (50 mL/Hr) IV Continuous <Continuous>        VITALS:  T(F): 96 (21 @ 07:01), Max: 96.9 (21 @ 19:00)  HR: 78 (21 @ 09:00)  BP: 117/58 (21 @ 09:00)  RR: 19 (21 @ 09:00)  SpO2: 97% (21 @ 09:00)  Wt(kg): --     @ 07:  -   @ 07:00  --------------------------------------------------------  IN: 2465 mL / OUT: 1489 mL / NET: 976 mL     07:  -   @ 07:00  --------------------------------------------------------  IN: 2425 mL / OUT: 945 mL / NET: 1480 mL     @ 07:01  -   @ 11:51  --------------------------------------------------------  IN: 390 mL / OUT: 290 mL / NET: 100 mL          PHYSICAL EXAM:  Constitutional: NAD  HEENT: anicteric sclera  Neck: No JVD  Respiratory: CTA  Cardiovascular: S1, S2, RRR  Gastrointestinal: BS+, soft, NT/ND  Extremities: mild peripheral edema  Neurological: Awake alert  : Has huggins.   Skin: No rashes  Vascular Access:    LABS:      143  |  109  |  37<H>  ----------------------------<  82  3.1<L>   |  23  |  1.6<H>  Creatinine Trend: 1.6<--, 1.9<--, 2.3<--, 2.4<--, 2.4<--, 2.6<--    Ca    7.7<L>      2021 05:36  Mg     1.9         TPro  4.5<L>  /  Alb  2.6<L>  /  TBili  0.7  /  DBili      /  AST  199<H>  /  ALT  65<H>  /  AlkPhos  64                            9.7    10.58 )-----------( 183      ( 2021 11:28 )             30.3       Urine Studies:  Urinalysis Basic - ( 2021 11:00 )    Color: Yellow / Appearance: Clear / S.020 / pH:   Gluc:  / Ketone: 15  / Bili: Small / Urobili: 1.0 mg/dL   Blood:  / Protein: >=300 mg/dL / Nitrite: Negative   Leuk Esterase: Negative / RBC: 3-5 /HPF / WBC 1-2 /HPF   Sq Epi:  / Non Sq Epi: Occasional /HPF / Bacteria: Moderate        RADIOLOGY & ADDITIONAL STUDIES:  < from: Xray Chest 1 View- PORTABLE-Routine (Xray Chest 1 View- PORTABLE-Routine in AM.) (21 @ 06:13) >    No focal consolidation, pneumothorax or pleural effusion on frontal view.    < end of copied text >  < from: CT Abdomen and Pelvis No Cont (04.15.21 @ 14:43) >  KIDNEYS: The left kidney is atrophic. There may be a right parapelvic cyst, limited in evaluation due to motion. There is no hydronephrosis. There are subcentimeter renal hypodensities, too small to characterize.    < end of copied text >

## 2021-04-19 NOTE — PROGRESS NOTE ADULT - SUBJECTIVE AND OBJECTIVE BOX
89yFemale  Being followed for bowel perforation  Interval history:      PAST MEDICAL & SURGICAL HISTORY:   Atrial fibrillation    GERD (gastroesophageal reflux disease)    HTN (hypertension)    No significant past surgical history            Social History: No smoking. No alcohol. No illegal drug use.          MEDICATIONS:  MEDICATIONS  (STANDING):  cefTRIAXone   IVPB 2000 milliGRAM(s) IV Intermittent every 24 hours  chlorhexidine 4% Liquid 1 Application(s) Topical <User Schedule>  metoprolol tartrate 25 milliGRAM(s) Oral two times a day  metroNIDAZOLE  IVPB 500 milliGRAM(s) IV Intermittent every 8 hours  pantoprazole Infusion 8 mG/Hr (10 mL/Hr) IV Continuous <Continuous>  potassium chloride   Powder 40 milliEquivalent(s) Oral every 4 hours  sodium chloride 0.45%. 1000 milliLiter(s) (50 mL/Hr) IV Continuous <Continuous>    MEDICATIONS  (PRN):  ALPRAZolam 0.25 milliGRAM(s) Oral daily PRN anxiety      Allergies:      REVIEW OF SYSTEMS:  General:  No weight loss, fevers, or chills.  Eyes:  No reported pain or visual changes  ENT:  No sore throat or runny nose.  NECK: No stiffness   CV:  No chest pain or palpitations.  Resp:  No shortness of breath, cough  GI:  No abdominal pain, nausea, vomiting, dysphagia, diarrhea or constipation. No rectal bleeding, melena, or hematemesis.  Muscle:  No aches or weakness  Neuro:  No tingling, numbness   Heme:  No ecchymosis or easy bruisability        VITAL SIGNS:   T(F): 96 (04-19-21 @ 07:01), Max: 96.9 (04-18-21 @ 19:00)  HR: 78 (04-19-21 @ 09:00) (70 - 94)  BP: 117/58 (04-19-21 @ 09:00) (100/52 - 170/70)  RR: 19 (04-19-21 @ 09:00) (12 - 23)  SpO2: 97% (04-19-21 @ 09:00) (96% - 98%)    PHYSICAL EXAM:  GENERAL: AAOx3, no acute distress.  HEAD:  Atraumatic, Normocephalic  EYES: conjunctiva and sclera clear  NECK: Supple, no JVD or thyromegaly  CHEST/LUNG: Clear to auscultation bilaterally; No wheeze, rhonchi, or rales  HEART: Regular rate and rhythm; normal S1, S2, No murmurs.  ABDOMEN: Soft, nontender, nondistended; Bowel sounds present, no abdominal bruit, masses, or hepatosplenomegaly  EXTREMITIES:  2+ Peripheral Pulses. No clubbing, cyanosis, or edema, warm  NEUROLOGY: No asterixis or tremor.   SKIN: Intact, no jaundice            LABS:                        8.8    9.82  )-----------( 155      ( 19 Apr 2021 05:36 )             28.2     04-19    143  |  109  |  37<H>  ----------------------------<  82  3.1<L>   |  23  |  1.6<H>    Ca    7.7<L>      19 Apr 2021 05:36  Mg     1.9     04-19    TPro  4.5<L>  /  Alb  2.6<L>  /  TBili  0.7  /  DBili  x   /  AST  199<H>  /  ALT  65<H>  /  AlkPhos  64  04-19    LIVER FUNCTIONS - ( 19 Apr 2021 05:36 )  Alb: 2.6 g/dL / Pro: 4.5 g/dL / ALK PHOS: 64 U/L / ALT: 65 U/L / AST: 199 U/L / GGT: x               IMAGING:           89yFemale  Being followed for bowel perforation  Interval history: Patient denies nausea, vomiting, hematemesis, blood in stool, diarrhea, constipation, abdominal pain. Patient having intermittent melenic stools.      PAST MEDICAL & SURGICAL HISTORY:   Atrial fibrillation    GERD (gastroesophageal reflux disease)    HTN (hypertension)    No significant past surgical history            Social History: No smoking. No alcohol. No illegal drug use.            MEDICATIONS  (STANDING):  cefTRIAXone   IVPB 2000 milliGRAM(s) IV Intermittent every 24 hours  chlorhexidine 4% Liquid 1 Application(s) Topical <User Schedule>  metoprolol tartrate 25 milliGRAM(s) Oral two times a day  metroNIDAZOLE  IVPB 500 milliGRAM(s) IV Intermittent every 8 hours  pantoprazole Infusion 8 mG/Hr (10 mL/Hr) IV Continuous <Continuous>  potassium chloride   Powder 40 milliEquivalent(s) Oral every 4 hours  sodium chloride 0.45%. 1000 milliLiter(s) (50 mL/Hr) IV Continuous <Continuous>    MEDICATIONS  (PRN):  ALPRAZolam 0.25 milliGRAM(s) Oral daily PRN anxiety      Allergies:     No Known Allergies            REVIEW OF SYSTEMS:  General:  No weight loss, fevers, or chills.  Eyes:  No reported pain or visual changes  ENT:  No sore throat or runny nose.  NECK: No stiffness   CV:  No chest pain or palpitations.  Resp:  No shortness of breath, cough  GI:  No abdominal pain, nausea, vomiting, dysphagia, diarrhea or constipation. No rectal bleeding, melena, or hematemesis.  Neuro:  No tingling, numbness         VITAL SIGNS:   T(F): 96 (04-19-21 @ 07:01), Max: 96.9 (04-18-21 @ 19:00)  HR: 78 (04-19-21 @ 09:00) (70 - 94)  BP: 117/58 (04-19-21 @ 09:00) (100/52 - 170/70)  RR: 19 (04-19-21 @ 09:00) (12 - 23)  SpO2: 97% (04-19-21 @ 09:00) (96% - 98%)    PHYSICAL EXAM:  GENERAL: AAOx3, no acute distress.  HEAD:  Atraumatic, Normocephalic  EYES: conjunctiva and sclera clear  NECK: Supple, no JVD or thyromegaly  CHEST/LUNG: Clear to auscultation bilaterally; No wheeze, rhonchi, or rales  HEART: Regular rate and rhythm; normal S1, S2, No murmurs.  ABDOMEN: Soft, nontender, nondistended; Bowel sounds present  NEUROLOGY: No asterixis or tremor.   SKIN: Intact, no jaundice            LABS:                        8.8    9.82  )-----------( 155      ( 19 Apr 2021 05:36 )             28.2     04-19    143  |  109  |  37<H>  ----------------------------<  82  3.1<L>   |  23  |  1.6<H>    Ca    7.7<L>      19 Apr 2021 05:36  Mg     1.9     04-19    TPro  4.5<L>  /  Alb  2.6<L>  /  TBili  0.7  /  DBili  x   /  AST  199<H>  /  ALT  65<H>  /  AlkPhos  64  04-19    LIVER FUNCTIONS - ( 19 Apr 2021 05:36 )  Alb: 2.6 g/dL / Pro: 4.5 g/dL / ALK PHOS: 64 U/L / ALT: 65 U/L / AST: 199 U/L / GGT: x               IMAGING:    < from: CT Abdomen and Pelvis No Cont (04.15.21 @ 14:43) >    EXAM:  CT CHEST        EXAM:  CT ABDOMEN AND PELVIS            PROCEDURE DATE:  04/15/2021            INTERPRETATION:  CLINICAL STATEMENT: Status post fall    TECHNIQUE: Contiguous axial CT images were obtained from the chest to the pubic symphysiswithout intravenous contrast.  Oral contrast was not given.  Reformatted images in the coronal and sagittal planes were acquired. Additional MIP images were obtained.    COMPARISON CT: None.    Study is severely limited in evaluation due to motion artifact.      FINDINGS:    CHEST: There is a trace nonspecific pericardial effusion. The thyroid gland is present with the right thyroid extending into the superior mediastinum. The thyroid gland is incompletely evaluated on CT. There is no definite evidence of thoracic adenopathy. The heart size is enlarged. There is atherosclerosis. The thoracic aorta is normal in caliber. The central tracheobronchial tree is patent. There is no consolidation, pneumothorax or pleural effusion. There are patchy areas of subsegmental atelectasis.    HEPATOBILIARY: The gallbladder is distended.    SPLEEN: Unremarkable..    PANCREAS: Fatty infiltration of the pancreas.    ADRENAL GLANDS: The left adrenal gland is thickened. The right adrenal gland is unremarkable.    KIDNEYS: The left kidney is atrophic. There may be a right parapelvic cyst, limited in evaluation due to motion. There is no hydronephrosis. There are subcentimeter renal hypodensities, too small to characterize.    ABDOMINOPELVIC NODES: Unremarkable..    PELVIC ORGANS: The uterus and adnexa are incompletely evaluated on CT.    PERITONEUM/MESENTERY/BOWEL: There are punctate foci of free intraperitoneal air in the upper abdomen, for example in the right upper quadrant on image 185 of series 3 and in the left upper quadrant image 177 of series 3. There is no evidence of obstruction.    BONES/SOFT TISSUES: There is cortical irregularity to the coccyx on image 380 of series 3 consistent with fracture of indeterminate age. Age indeterminate compression deformity of L1. Clinical correlation is recommended. There are degenerative changes. There is a scoliosis. Evaluation of the ribs for traumatic injury is limited due to motion artifact; nondisplaced fractures are difficult to exclude. Thereis a large fat-containing right hip lesion measuring 10 cm, indeterminate.    OTHER: There are abdominal pelvic vascular calcifications. The infrarenal abdominal aorta measures up to 2.4 cm..      IMPRESSION:    Free intraperitoneal air in the upper abdomen suggestive of bowel perforation of unclear origin.    Age indeterminate compression deformity of L1 as well as age-indeterminate cortical irregularity to the coccyx. Findings are consistent with fracture of indeterminate age. Clinical correlation for point tenderness is recommended.    Distended gallbladder    Indeterminate fat-containing lesion within the right hip measuring 10 cm          Spoke with PILLO MOROCHO PA on 4/15/2021 3:41 PM with readback.              DERECK GIORDANO MD; Attending Radiologist  This document has been electronically signed. Apr 15 2021  3:49PM    < end of copied text >

## 2021-04-19 NOTE — PHYSICAL THERAPY INITIAL EVALUATION ADULT - PERTINENT HX OF CURRENT PROBLEM, REHAB EVAL
Admitted for GIB, perforated Ulcer, TYRESE, Rhabdomyolysis; was found on the floor at home with weakness; NGT placed in ED and was removed on 4/16/21; s/p blood transfusion; awaiting  possible EGD

## 2021-04-19 NOTE — PROGRESS NOTE ADULT - SUBJECTIVE AND OBJECTIVE BOX
Patient is a 89y old  Female who presents with a chief complaint of Anemia , Afib, leeann, acidosis (18 Apr 2021 10:44)      T(F): 96 (04-19-21 @ 07:01), Max: 98.1 (04-18-21 @ 11:00)  HR: 86 (04-19-21 @ 05:55)  BP: 170/70 (04-19-21 @ 05:55)  RR: 18 (04-19-21 @ 07:01)  SpO2: 96% (04-19-21 @ 01:00) (95% - 98%)    PHYSICAL EXAM:  GENERAL: NAD, well-groomed, well-developed  HEAD:  Atraumatic, Normocephalic  EYES: EOMI, PERRLA, conjunctiva and sclera clear  ENMT: No tonsillar erythema, exudates, or enlargement; Moist mucous membranes, Good dentition, No lesions  NECK: Supple, No JVD, Normal thyroid  NERVOUS SYSTEM:  Alert & Oriented X3,  Motor Strength 5/5 B/L upper and lower extremities  CHEST/LUNG: Clear to percussion bilaterally; No rales, rhonchi, wheezing, or rubs  HEART: Irreg No murmurs, rubs, or gallops  ABDOMEN: Soft, Nontender, Nondistended; Bowel sounds present  EXTREMITIES:   No clubbing, cyanosis, or edema  LYMPH: No lymphadenopathy noted  SKIN: No rashes or lesions    labs  04-18    149<H>  |  113<H>  |  49<H>  ----------------------------<  89  3.5   |  26  |  1.9<H>    Ca    7.9<L>      18 Apr 2021 05:31  Mg     2.2     04-18    TPro  4.8<L>  /  Alb  2.8<L>  /  TBili  0.7  /  DBili  x   /  AST  212<H>  /  ALT  62<H>  /  AlkPhos  72  04-18                          8.8    9.82  )-----------( 155      ( 19 Apr 2021 05:36 )             28.2       Culture - Blood (collected 16 Apr 2021 11:17)  Source: .Blood None  Preliminary Report (17 Apr 2021 23:01):    No growth to date.    Culture - Urine (collected 16 Apr 2021 11:00)  Source: .Urine Clean Catch (Midstream)  Final Report (17 Apr 2021 18:53):    No growth              ALPRAZolam 0.25 milliGRAM(s) Oral daily PRN  cefTRIAXone   IVPB 2000 milliGRAM(s) IV Intermittent every 24 hours  chlorhexidine 4% Liquid 1 Application(s) Topical <User Schedule>  metoprolol tartrate Injectable 5 milliGRAM(s) IV Push every 6 hours  metroNIDAZOLE  IVPB 500 milliGRAM(s) IV Intermittent every 8 hours  pantoprazole Infusion 8 mG/Hr IV Continuous <Continuous>  sodium chloride 0.45%. 1000 milliLiter(s) IV Continuous <Continuous>

## 2021-04-19 NOTE — PROGRESS NOTE ADULT - SUBJECTIVE AND OBJECTIVE BOX
SUBJECTIVE:    Patient is a 88 y/o Female who presents to [facility] after being found unresponsive at home.     Currently admitted to medicine with the primary diagnosis of GI bleed.     Today is hospital day 4. Patient was seen and examined at bedside. This morning she is resting comfortably in bed and reports no new issues or overnight events.     PAST MEDICAL & SURGICAL HISTORY  PAST MEDICAL & SURGICAL HISTORY:  Atrial fibrillation    GERD (gastroesophageal reflux disease)    HTN (hypertension)    No significant past surgical history      SOCIAL HISTORY:  Lives alone  NO smoking   No etoh    ALLERGIES:  No Known Allergies    MEDICATIONS:  STANDING MEDICATIONS  cefTRIAXone   IVPB 2000 milliGRAM(s) IV Intermittent every 24 hours  chlorhexidine 4% Liquid 1 Application(s) Topical <User Schedule>  metoprolol tartrate 25 milliGRAM(s) Oral two times a day  metroNIDAZOLE  IVPB 500 milliGRAM(s) IV Intermittent every 8 hours  pantoprazole Infusion 8 mG/Hr IV Continuous <Continuous>  sodium chloride 0.45%. 1000 milliLiter(s) IV Continuous <Continuous>    PRN MEDICATIONS  ALPRAZolam 0.25 milliGRAM(s) Oral daily PRN    VITALS:   T(F): 96  HR: 78  BP: 117/58  RR: 19  SpO2: 97%    LABS:                        9.7    10.58 )-----------( 183      ( 2021 11:28 )             30.3         143  |  109  |  37<H>  ----------------------------<  82  3.1<L>   |  23  |  1.6<H>    Ca    7.7<L>      2021 05:36  Mg     1.9         TPro  4.5<L>  /  Alb  2.6<L>  /  TBili  0.7  /  DBili  x   /  AST  199<H>  /  ALT  65<H>  /  AlkPhos  64        Urinalysis Basic - ( 2021 12:00 )    Color: Yellow / Appearance: Clear / S.025 / pH: x  Gluc: x / Ketone: Negative  / Bili: Negative / Urobili: 1.0 mg/dL   Blood: x / Protein: >=300 mg/dL / Nitrite: Negative   Leuk Esterase: Negative / RBC: x / WBC x   Sq Epi: x / Non Sq Epi: x / Bacteria: x        Creatine Kinase, Serum: 6925 U/L *H* (21 @ 05:36)      CARDIAC MARKERS ( 2021 05:36 )  x     / x     / 6925 U/L / x     / x      CARDIAC MARKERS ( 2021 05:31 )  x     / x     / 8688 U/L / x     / x          RADIOLOGY:  < from: US Abdomen Limited (21 @ 12:15) >    IMPRESSION:    Sludge within the gallbladder. Nonobstructing right renal calculus. Without significant change.      < end of copied text >  < from: CT Head No Cont (21 @ 11:55) >    IMPRESSION:  No evidence of acute transcortical infarct, acute intracranial hemorrhage, or mass effect.          < end of copied text >  < from: CT Chest No Cont (04.15.21 @ 14:43) >    IMPRESSION:    Free intraperitoneal air in the upper abdomen suggestive of bowel perforation of unclear origin.    Age indeterminate compression deformity of L1 as well as age-indeterminate cortical irregularity to the coccyx. Findings are consistent with fracture of indeterminate age. Clinical correlation for point tenderness is recommended.    Distended gallbladder    Indeterminate fat-containing lesion within the right hip measuring 10 cm        < end of copied text >        PHYSICAL EXAM:  GENERAL: NAD, speaks in full sentences, no signs of respiratory distress  HEAD: Atraumatic  NECK: Supple  CHEST/LUNG: Clear to auscultation bilaterally; No wheeze or crackles  HEART: S1, S2; RRR; No murmurs, rubs, or gallops  ABDOMEN: BS+; Soft, Non-tender, Non-distended  EXTREMITIES:  2+ Peripheral Pulses, No clubbing, cyanosis, or edema  PSYCH: AAOx3  NEUROLOGY: non-focal  SKIN: No rashes or lesions     SUBJECTIVE:    Patient is a 89 y/o Female who presents to [facility] after being found unresponsive at home.     Currently admitted to medicine with the primary diagnosis of GI bleed.     Today is hospital day 4. Patient was seen and examined at bedside. This morning she is resting comfortably in bed.  Several episodes of dark stool this AM.  Slightly confused overnight.     PAST MEDICAL & SURGICAL HISTORY  PAST MEDICAL & SURGICAL HISTORY:  Atrial fibrillation    GERD (gastroesophageal reflux disease)    HTN (hypertension)    No significant past surgical history    SOCIAL HISTORY:  Lives alone  NO smoking   No etoh    ALLERGIES:  No Known Allergies    MEDICATIONS:  STANDING MEDICATIONS  cefTRIAXone   IVPB 2000 milliGRAM(s) IV Intermittent every 24 hours  chlorhexidine 4% Liquid 1 Application(s) Topical <User Schedule>  metoprolol tartrate 25 milliGRAM(s) Oral two times a day  metroNIDAZOLE  IVPB 500 milliGRAM(s) IV Intermittent every 8 hours  pantoprazole Infusion 8 mG/Hr IV Continuous <Continuous>  sodium chloride 0.45%. 1000 milliLiter(s) IV Continuous <Continuous>    PRN MEDICATIONS  ALPRAZolam 0.25 milliGRAM(s) Oral daily PRN    VITALS:   T(F): 96  HR: 78  BP: 117/58  RR: 19  SpO2: 97%    LABS:                        9.7    10.58 )-----------( 183      ( 2021 11:28 )             30.3         143  |  109  |  37<H>  ----------------------------<  82  3.1<L>   |  23  |  1.6<H>    Ca    7.7<L>      2021 05:36  Mg     1.9         TPro  4.5<L>  /  Alb  2.6<L>  /  TBili  0.7  /  DBili  x   /  AST  199<H>  /  ALT  65<H>  /  AlkPhos  64        Urinalysis Basic - ( 2021 12:00 )    Color: Yellow / Appearance: Clear / S.025 / pH: x  Gluc: x / Ketone: Negative  / Bili: Negative / Urobili: 1.0 mg/dL   Blood: x / Protein: >=300 mg/dL / Nitrite: Negative   Leuk Esterase: Negative / RBC: x / WBC x   Sq Epi: x / Non Sq Epi: x / Bacteria: x        Creatine Kinase, Serum: 6925 U/L *H* (21 @ 05:36)      CARDIAC MARKERS ( 2021 05:36 )  x     / x     / 6925 U/L / x     / x      CARDIAC MARKERS ( 2021 05:31 )  x     / x     / 8688 U/L / x     / x          RADIOLOGY:  < from: US Abdomen Limited (21 @ 12:15) >    IMPRESSION:    Sludge within the gallbladder. Nonobstructing right renal calculus. Without significant change.      < end of copied text >  < from: CT Head No Cont (21 @ 11:55) >    IMPRESSION:  No evidence of acute transcortical infarct, acute intracranial hemorrhage, or mass effect.          < end of copied text >  < from: CT Chest No Cont (04.15.21 @ 14:43) >    IMPRESSION:    Free intraperitoneal air in the upper abdomen suggestive of bowel perforation of unclear origin.    Age indeterminate compression deformity of L1 as well as age-indeterminate cortical irregularity to the coccyx. Findings are consistent with fracture of indeterminate age. Clinical correlation for point tenderness is recommended.    Distended gallbladder    Indeterminate fat-containing lesion within the right hip measuring 10 cm        < end of copied text >        PHYSICAL EXAM:  GENERAL: NAD, speaks in full sentences, no signs of respiratory distress  HEAD: Atraumatic  NECK: Supple  CHEST/LUNG: Clear to auscultation bilaterally; No wheeze or crackles  HEART: S1, S2; RRR; No murmurs, rubs, or gallops  ABDOMEN: BS+; Soft, Non-tender, Non-distended  EXTREMITIES:  2+ Peripheral Pulses, No clubbing, cyanosis, or edema  NEUROLOGY: non-focal  SKIN: No rashes or lesions

## 2021-04-19 NOTE — PHYSICAL THERAPY INITIAL EVALUATION ADULT - GENERAL OBSERVATIONS, REHAB EVAL
10:07-10:32  Chart reviewed. Pt encountered semireclined in bed,  may be seen by Physical Therapist as confirmed with Nurse; confirmed with ICU Resident MD Tracey that patient may be seen by PT, aware of imaging results including age indeterminate L1 compression deformity.  Patient denied pain at rest and ready to get up now; +tele; +IV; +huggins

## 2021-04-19 NOTE — PROGRESS NOTE ADULT - ASSESSMENT
· Assessment	  Impression:  88 y/o female with CT scan findings of punctate areas of abdominal free air.       Plan:   - no acute surgical intervention   - from surgery perspective,  may advance diet as tolerated per S&S recommendations   - will sign off. reconsult PRN   - pt seen with Dr. Moura

## 2021-04-19 NOTE — PROGRESS NOTE ADULT - SUBJECTIVE AND OBJECTIVE BOX
Patient is a 89y old  Female who presents with a chief complaint of Anemia , Afib, leeann, acidosis (19 Apr 2021 08:13)      Over Night Events:  Patient seen and examined.       ROS:  See HPI    PHYSICAL EXAM    ICU Vital Signs Last 24 Hrs  T(C): 35.6 (19 Apr 2021 07:01), Max: 36.7 (18 Apr 2021 11:00)  T(F): 96 (19 Apr 2021 07:01), Max: 98.1 (18 Apr 2021 11:00)  HR: 86 (19 Apr 2021 05:55) (70 - 103)  BP: 170/70 (19 Apr 2021 05:55) (100/52 - 170/70)  BP(mean): 101 (19 Apr 2021 05:55) (74 - 124)  ABP: --  ABP(mean): --  RR: 18 (19 Apr 2021 07:01) (12 - 28)  SpO2: 96% (19 Apr 2021 01:00) (95% - 98%)      General:  HEENT:                Lymph Nodes: NO cervical LN   Lungs: Bilateral BS  Cardiovascular: Regular   Abdomen: Soft, Positive BS  Extremities: No clubbing   Skin: warm   Neurological:   Musculoskeletal: move all ext     I&O's Detail    18 Apr 2021 07:01  -  19 Apr 2021 07:00  --------------------------------------------------------  IN:    IV PiggyBack: 200 mL    IV PiggyBack: 50 mL    Oral Fluid: 220 mL    Pantoprazole: 230 mL    sodium chloride 0.45%: 1725 mL  Total IN: 2425 mL    OUT:    Indwelling Catheter - Urethral (mL): 945 mL  Total OUT: 945 mL    Total NET: 1480 mL      19 Apr 2021 07:01  -  19 Apr 2021 08:52  --------------------------------------------------------  IN:  Total IN: 0 mL    OUT:    Indwelling Catheter - Urethral (mL): 100 mL  Total OUT: 100 mL    Total NET: -100 mL          LABS:                          8.8    9.82  )-----------( 155      ( 19 Apr 2021 05:36 )             28.2         19 Apr 2021 05:36    143    |  109    |  37     ----------------------------<  82     3.1     |  23     |  1.6      Ca    7.7        19 Apr 2021 05:36  Mg     1.9       19 Apr 2021 05:36    TPro  4.5    /  Alb  2.6    /  TBili  0.7    /  DBili  x      /  AST  199    /  ALT  65     /  AlkPhos  64     19 Apr 2021 05:36  Amylase x     lipase x                                                                                              CARDIAC MARKERS ( 19 Apr 2021 05:36 )  x     / x     / 6925 U/L / x     / x      CARDIAC MARKERS ( 18 Apr 2021 05:31 )  x     / x     / 8688 U/L / x     / x                                                            Culture - Blood (collected 16 Apr 2021 11:17)  Source: .Blood None  Preliminary Report (17 Apr 2021 23:01):    No growth to date.    Culture - Urine (collected 16 Apr 2021 11:00)  Source: .Urine Clean Catch (Midstream)  Final Report (17 Apr 2021 18:53):    No growth                                                                                           MEDICATIONS  (STANDING):  cefTRIAXone   IVPB 2000 milliGRAM(s) IV Intermittent every 24 hours  chlorhexidine 4% Liquid 1 Application(s) Topical <User Schedule>  metoprolol tartrate Injectable 5 milliGRAM(s) IV Push every 6 hours  metroNIDAZOLE  IVPB 500 milliGRAM(s) IV Intermittent every 8 hours  pantoprazole Infusion 8 mG/Hr (10 mL/Hr) IV Continuous <Continuous>  potassium chloride   Powder 40 milliEquivalent(s) Oral every 4 hours  sodium chloride 0.45%. 1000 milliLiter(s) (50 mL/Hr) IV Continuous <Continuous>    MEDICATIONS  (PRN):  ALPRAZolam 0.25 milliGRAM(s) Oral daily PRN anxiety          Xrays:  TLC:  OG:  ET tube:                                                                                       ECHO:  CAM ICU:    IMPRESSION:      PLAN:    CNS:    HEENT:    PULMONARY:    CARDIOVASCULAR:    GI: GI prophylaxis.  Feeding     RENAL:    INFECTIOUS DISEASE:    HEMATOLOGICAL:  DVT prophylaxis.    ENDOCRINE:  Follow up FS.  Insulin protocol if needed.    MUSCULOSKELETAL:

## 2021-04-19 NOTE — PROGRESS NOTE ADULT - SUBJECTIVE AND OBJECTIVE BOX
S; Pt denies abdominal pain, has been tolerating clear liquids.   O; Vital Signs Last 24 Hrs  T(C): 35.6 (19 Apr 2021 07:01), Max: 36.1 (18 Apr 2021 19:00)  T(F): 96 (19 Apr 2021 07:01), Max: 96.9 (18 Apr 2021 19:00)  HR: 78 (19 Apr 2021 09:00) (70 - 94)  BP: 117/58 (19 Apr 2021 09:00) (100/52 - 170/70)  BP(mean): 83 (19 Apr 2021 09:00) (74 - 105)  RR: 19 (19 Apr 2021 09:00) (12 - 23)  SpO2: 97% (19 Apr 2021 09:00) (96% - 98%)

## 2021-04-19 NOTE — PROGRESS NOTE ADULT - ASSESSMENT
89 year old woman with PMH of Atrial Fibrillation on xeralto, GERD who presents after being found on floor with hx of black stool over past few weeks, found to have free air under diaphragm    IMPRESSION  #Sepsis on admission (WBC>12, HR>90), rule out GN bacteremia  - CT Abdomen and Pelvis No Cont (04.15.21 @ 14:43): Free intraperitoneal air in the upper abdomen suggestive of bowel perforation of unclear origin. Age indeterminate compression deformity of L1 as well as age-indeterminate cortical irregularity to the coccyx. Findings are consistent with fracture of indeterminate age. Clinical correlation for point tenderness is recommended. Distended gallbladder Indeterminate fat-containing lesion within the right hip measuring 10 cm    On cefTRIAXone   IVPB 2000 milliGRAM(s) IV Intermittent every 24 hours  metroNIDAZOLE  IVPB 500 milliGRAM(s) IV Intermittent every 8 hours  B/C x1 negative  T(F): 96 (19 Apr 2021 07:01), Max: 96.9 (18 Apr 2021 19:00)  wbc 10.58    #GI Bleed  #Transaminitis  #Atrial Fibrillation on xarelto   #Resolved hypernatremia  # CPK improving, Cr improving    RECOMMENDATIONS  Continue ceftriaxone 2g daily and flagyl 500 mg TID    - trend H/H, abdominal exam

## 2021-04-19 NOTE — PROGRESS NOTE ADULT - SUBJECTIVE AND OBJECTIVE BOX
CECILLE RODRIGUES  89y, Female  Allergy: No Known Allergies      CHIEF COMPLAINT: Anemia , Afib, leeann, acidosis (19 Apr 2021 11:51)      INTERVAL EVENTS/HPI  - No acute events overnight  - T(F): , Max: 96.9 (04-18-21 @ 19:00)  - Denies any worsening symptoms  - Tolerating medication  - WBC Count: 10.58 K/uL (04-19-21 @ 11:28)      ROS  General: Denies fevers, chills, nightsweats, weight loss  HEENT: Denies headache, rhinorrhea, sore throat, eye pain  CV: Denies CP, palpitations  PULM: Denies SOB, cough  GI: Denies abdominal pain, diarrhea  : Denies dysuria, hematuria  MSK: Denies arthralgias  SKIN: Denies rash   NEURO: Denies paresthesias, weakness  PSYCH: Denies depression    FH non-contributory   Social Hx non-contributory    VITALS:  T(F): 96, Max: 96.9 (04-18-21 @ 19:00)  HR: 78  BP: 117/58  RR: 19Vital Signs Last 24 Hrs  T(C): 35.6 (19 Apr 2021 07:01), Max: 36.1 (18 Apr 2021 19:00)  T(F): 96 (19 Apr 2021 07:01), Max: 96.9 (18 Apr 2021 19:00)  HR: 78 (19 Apr 2021 09:00) (70 - 94)  BP: 117/58 (19 Apr 2021 09:00) (100/52 - 170/70)  BP(mean): 83 (19 Apr 2021 09:00) (74 - 105)  RR: 19 (19 Apr 2021 09:00) (12 - 23)  SpO2: 97% (19 Apr 2021 09:00) (96% - 98%)    PHYSICAL EXAM:  Gen: NAD, resting in bed  HEENT: Normocephalic, atraumatic  Neck: supple, no lymphadenopathy  CV: Regular rate & regular rhythm  Lungs: decreased BS at bases, no fremitus  Abdomen: Soft, BS present  Ext: Warm, well perfused  Neuro: non focal, awake  Skin: no rash, no erythema      TESTS & MEASUREMENTS:                        9.7    10.58 )-----------( 183      ( 19 Apr 2021 11:28 )             30.3     04-19    143  |  109  |  37<H>  ----------------------------<  82  3.1<L>   |  23  |  1.6<H>    Ca    7.7<L>      19 Apr 2021 05:36  Mg     1.9     04-19    TPro  4.5<L>  /  Alb  2.6<L>  /  TBili  0.7  /  DBili  x   /  AST  199<H>  /  ALT  65<H>  /  AlkPhos  64  04-19    eGFR if Non African American: 28 mL/min/1.73M2 (04-19-21 @ 05:36)  eGFR if : 33 mL/min/1.73M2 (04-19-21 @ 05:36)    LIVER FUNCTIONS - ( 19 Apr 2021 05:36 )  Alb: 2.6 g/dL / Pro: 4.5 g/dL / ALK PHOS: 64 U/L / ALT: 65 U/L / AST: 199 U/L / GGT: x               Culture - Blood (collected 04-16-21 @ 11:17)  Source: .Blood None  Preliminary Report (04-17-21 @ 23:01):    No growth to date.    Culture - Urine (collected 04-16-21 @ 11:00)  Source: .Urine Clean Catch (Midstream)  Final Report (04-17-21 @ 18:53):    No growth        Lactate, Blood: 1.3 mmol/L (04-16-21 @ 05:36)  Lactate, Blood: 3.1 mmol/L (04-15-21 @ 22:02)  Blood Gas Venous - Lactate: 2.8 mmoL/L (04-15-21 @ 18:25)  Blood Gas Venous - Lactate: 9.2 mmoL/L (04-15-21 @ 13:16)      INFECTIOUS DISEASES TESTING      RADIOLOGY & ADDITIONAL TESTS:  I have personally reviewed the last Chest xray  CXR      CT      CARDIOLOGY TESTING  12 Lead ECG:   Ventricular Rate 77 BPM    Atrial Rate 111 BPM    QRS Duration 96 ms    Q-T Interval 402 ms    QTC Calculation(Bazett) 454 ms    R Axis 53 degrees    T Axis -31 degrees    Diagnosis Line Atrial fibrillation  T wave abnormality, consider anterior ischemia  Abnormal ECG    Confirmed by EBEN TOLEDO MD ([id number]) on 4/19/2021 11:23:37 AM (04-19-21 @ 07:40)  12 Lead ECG:   Ventricular Rate 96 BPM    Atrial Rate 234 BPM    QRS Duration 82 ms    Q-T Interval 328 ms    QTC Calculation(Bazett) 414 ms    R Axis 15 degrees    T Axis 194 degrees    Diagnosis Line Atrial fibrillation  Poor R wave progression  Confirmed by EBEN TOLEDO MD [id number]) on 4/19/2021 11:23:28 AM (04-18-21 @ 10:18)      MEDICATIONS  cefTRIAXone   IVPB 2000  chlorhexidine 4% Liquid 1  metoprolol tartrate 25  metroNIDAZOLE  IVPB 500  pantoprazole Infusion 8  potassium chloride   Powder 40  sodium chloride 0.45%. 1000      ANTIBIOTICS:  cefTRIAXone   IVPB 2000 milliGRAM(s) IV Intermittent every 24 hours  metroNIDAZOLE  IVPB 500 milliGRAM(s) IV Intermittent every 8 hours      All available historical data has been reviewed

## 2021-04-19 NOTE — PROGRESS NOTE ADULT - SUBJECTIVE AND OBJECTIVE BOX
Patient is a 89y old  Female who presents with a chief complaint of Anemia , Afib, leeann, acidosis (19 Apr 2021 08:52)      Over Night Events:  Patient seen and examined.   tolerating diet   VS stable     ROS:  See HPI    PHYSICAL EXAM    ICU Vital Signs Last 24 Hrs  T(C): 35.6 (19 Apr 2021 07:01), Max: 36.7 (18 Apr 2021 11:00)  T(F): 96 (19 Apr 2021 07:01), Max: 98.1 (18 Apr 2021 11:00)  HR: 80 (19 Apr 2021 07:15) (70 - 94)  BP: 147/67 (19 Apr 2021 07:15) (100/52 - 170/70)  BP(mean): 96 (19 Apr 2021 07:15) (74 - 105)  ABP: --  ABP(mean): --  RR: 19 (19 Apr 2021 07:15) (12 - 28)  SpO2: 98% (19 Apr 2021 07:15) (95% - 98%)      General: Awake   HEENT:   alma             Lymph Nodes: NO cervical LN   Lungs: Bilateral BS  Cardiovascular: Regular   Abdomen: Soft, Positive BS  Extremities: No clubbing   Skin: warm   Neurological: no focal   Musculoskeletal: move all ext     I&O's Detail    18 Apr 2021 07:01  -  19 Apr 2021 07:00  --------------------------------------------------------  IN:    IV PiggyBack: 200 mL    IV PiggyBack: 50 mL    Oral Fluid: 220 mL    Pantoprazole: 230 mL    sodium chloride 0.45%: 1725 mL  Total IN: 2425 mL    OUT:    Indwelling Catheter - Urethral (mL): 945 mL  Total OUT: 945 mL    Total NET: 1480 mL      19 Apr 2021 07:01  -  19 Apr 2021 09:19  --------------------------------------------------------  IN:    Oral Fluid: 120 mL    Pantoprazole: 20 mL    sodium chloride 0.45%: 150 mL  Total IN: 290 mL    OUT:    Indwelling Catheter - Urethral (mL): 250 mL  Total OUT: 250 mL    Total NET: 40 mL          LABS:                          8.8    9.82  )-----------( 155      ( 19 Apr 2021 05:36 )             28.2         19 Apr 2021 05:36    143    |  109    |  37     ----------------------------<  82     3.1     |  23     |  1.6      Ca    7.7        19 Apr 2021 05:36  Mg     1.9       19 Apr 2021 05:36    TPro  4.5    /  Alb  2.6    /  TBili  0.7    /  DBili  x      /  AST  199    /  ALT  65     /  AlkPhos  64     19 Apr 2021 05:36  Amylase x     lipase x                                                                                              CARDIAC MARKERS ( 19 Apr 2021 05:36 )  x     / x     / 6925 U/L / x     / x      CARDIAC MARKERS ( 18 Apr 2021 05:31 )  x     / x     / 8688 U/L / x     / x                                                            Culture - Blood (collected 16 Apr 2021 11:17)  Source: .Blood None  Preliminary Report (17 Apr 2021 23:01):    No growth to date.    Culture - Urine (collected 16 Apr 2021 11:00)  Source: .Urine Clean Catch (Midstream)  Final Report (17 Apr 2021 18:53):    No growth                                                                                           MEDICATIONS  (STANDING):  cefTRIAXone   IVPB 2000 milliGRAM(s) IV Intermittent every 24 hours  chlorhexidine 4% Liquid 1 Application(s) Topical <User Schedule>  metoprolol tartrate Injectable 5 milliGRAM(s) IV Push every 6 hours  metroNIDAZOLE  IVPB 500 milliGRAM(s) IV Intermittent every 8 hours  pantoprazole Infusion 8 mG/Hr (10 mL/Hr) IV Continuous <Continuous>  potassium chloride   Powder 40 milliEquivalent(s) Oral every 4 hours  sodium chloride 0.45%. 1000 milliLiter(s) (50 mL/Hr) IV Continuous <Continuous>    MEDICATIONS  (PRN):  ALPRAZolam 0.25 milliGRAM(s) Oral daily PRN anxiety          Xrays:  TLC:  OG:  ET tube:                                                                                       ECHO:  CAM ICU:

## 2021-04-19 NOTE — PROGRESS NOTE ADULT - ASSESSMENT
89yFemale pmh A-Fib on Eliquis last dose yesterday, GERD, HTN presents for fall. Patient reports intermittent dark brown stools and GI was called for her anemia.    Problem 1-Anemia with intermittent dark stools as per patient  ddx PUD, malignancy, retroperitoneal hemorrhage, diverticulosis  -rectal exam without evidence for active GI bleeding   Free intraperitoneal air in the upper abdomen suggestive of bowel perforation of unclear origin.  Rec  -monitor patient, continued diet resumption as per surgery  -EGD in one-two months after healing of perforation   -clear liquids  -Transfuse prn to hgb >8  -Two large bore IV lines  -PPI IV BID  -Monitor Vital Signs    Problem 2-Altered liver chemistries   -distended gallbladder  ddx cholestasis of sepsis, rhabdo , infectious etiology  Rec  -check RUQ ultrasound   -treat sepsis  -surgery on board  -If LFTs fail to improve Check Hepatitis B Sag, Hep B SAB, Hep A Ab, Hep C Ab,Smooth Muscle Antibody, Anti LK M1 antibody, AMA, KEVIN, Ceruloplasmin, Ferritin, Alpha-1-antitrypsin, CMV, Herpes serologies, EBV serologies,     89yFemale pmh A-Fib on Eliquis last dose yesterday, GERD, HTN presents for fall. Patient reports intermittent dark brown stools and GI was called for her anemia.    Problem 1-Anemia with intermittent dark stools as per patient  ddx PUD, malignancy, retroperitoneal hemorrhage, diverticulosis  -rectal exam without evidence for active GI bleeding   Free intraperitoneal air in the upper abdomen suggestive of bowel perforation of unclear origin.  Rec  -monitor patient, continued diet resumption as per surgery  -EGD in one-two months after healing of perforation   -clear liquids  -Transfuse prn to hgb >8  -Two large bore IV lines  -PPI IV BID  -abx  -Monitor Vital Signs    Problem 2-Altered liver chemistries   -distended gallbladder  ddx cholestasis of sepsis, rhabdo , infectious etiology  Rec  -RUQ ultrasound appreciated WNL no cbd dilation   -treat sepsis  -surgery on board  -If LFTs fail to improve Check Hepatitis B Sag, Hep B SAB, Hep A Ab, Hep C Ab,Smooth Muscle Antibody, Anti LK M1 antibody, AMA, KEVIN, Ceruloplasmin, Ferritin, Alpha-1-antitrypsin, CMV, Herpes serologies, EBV serologies,

## 2021-04-19 NOTE — PHYSICAL THERAPY INITIAL EVALUATION ADULT - GAIT DEVIATIONS NOTED, PT EVAL
stooped posture, dec heel strike/pushoff , dec MERCEDES/decreased adi/decreased step length/decreased weight-shifting ability

## 2021-04-20 LAB
ALBUMIN SERPL ELPH-MCNC: 2.6 G/DL — LOW (ref 3.5–5.2)
ALP SERPL-CCNC: 65 U/L — SIGNIFICANT CHANGE UP (ref 30–115)
ALT FLD-CCNC: 61 U/L — HIGH (ref 0–41)
AMMONIA BLD-MCNC: 24 UMOL/L — SIGNIFICANT CHANGE UP (ref 11–55)
ANION GAP SERPL CALC-SCNC: 10 MMOL/L — SIGNIFICANT CHANGE UP (ref 7–14)
APPEARANCE UR: CLEAR — SIGNIFICANT CHANGE UP
AST SERPL-CCNC: 136 U/L — HIGH (ref 0–41)
BACTERIA # UR AUTO: ABNORMAL
BASOPHILS # BLD AUTO: 0.01 K/UL — SIGNIFICANT CHANGE UP (ref 0–0.2)
BASOPHILS NFR BLD AUTO: 0.1 % — SIGNIFICANT CHANGE UP (ref 0–1)
BILIRUB SERPL-MCNC: 0.7 MG/DL — SIGNIFICANT CHANGE UP (ref 0.2–1.2)
BILIRUB UR-MCNC: NEGATIVE — SIGNIFICANT CHANGE UP
BUN SERPL-MCNC: 28 MG/DL — HIGH (ref 10–20)
CALCIUM SERPL-MCNC: 8 MG/DL — LOW (ref 8.5–10.1)
CHLORIDE SERPL-SCNC: 111 MMOL/L — HIGH (ref 98–110)
CO2 SERPL-SCNC: 21 MMOL/L — SIGNIFICANT CHANGE UP (ref 17–32)
COLOR SPEC: YELLOW — SIGNIFICANT CHANGE UP
CREAT SERPL-MCNC: 1.4 MG/DL — SIGNIFICANT CHANGE UP (ref 0.7–1.5)
DIFF PNL FLD: ABNORMAL
EOSINOPHIL # BLD AUTO: 0.15 K/UL — SIGNIFICANT CHANGE UP (ref 0–0.7)
EOSINOPHIL NFR BLD AUTO: 1.4 % — SIGNIFICANT CHANGE UP (ref 0–8)
GLUCOSE SERPL-MCNC: 92 MG/DL — SIGNIFICANT CHANGE UP (ref 70–99)
GLUCOSE UR QL: NEGATIVE MG/DL — SIGNIFICANT CHANGE UP
HCT VFR BLD CALC: 28.5 % — LOW (ref 37–47)
HCV AB S/CO SERPL IA: 0.04 COI — SIGNIFICANT CHANGE UP
HCV AB SERPL-IMP: SIGNIFICANT CHANGE UP
HGB BLD-MCNC: 9 G/DL — LOW (ref 12–16)
IMM GRANULOCYTES NFR BLD AUTO: 0.6 % — HIGH (ref 0.1–0.3)
KETONES UR-MCNC: NEGATIVE — SIGNIFICANT CHANGE UP
LEUKOCYTE ESTERASE UR-ACNC: NEGATIVE — SIGNIFICANT CHANGE UP
LYMPHOCYTES # BLD AUTO: 0.77 K/UL — LOW (ref 1.2–3.4)
LYMPHOCYTES # BLD AUTO: 7.4 % — LOW (ref 20.5–51.1)
MAGNESIUM SERPL-MCNC: 1.8 MG/DL — SIGNIFICANT CHANGE UP (ref 1.8–2.4)
MCHC RBC-ENTMCNC: 30.4 PG — SIGNIFICANT CHANGE UP (ref 27–31)
MCHC RBC-ENTMCNC: 31.6 G/DL — LOW (ref 32–37)
MCV RBC AUTO: 96.3 FL — SIGNIFICANT CHANGE UP (ref 81–99)
MONOCYTES # BLD AUTO: 1.19 K/UL — HIGH (ref 0.1–0.6)
MONOCYTES NFR BLD AUTO: 11.4 % — HIGH (ref 1.7–9.3)
NEUTROPHILS # BLD AUTO: 8.23 K/UL — HIGH (ref 1.4–6.5)
NEUTROPHILS NFR BLD AUTO: 79.1 % — HIGH (ref 42.2–75.2)
NITRITE UR-MCNC: NEGATIVE — SIGNIFICANT CHANGE UP
NRBC # BLD: 0 /100 WBCS — SIGNIFICANT CHANGE UP (ref 0–0)
PH UR: 7 — SIGNIFICANT CHANGE UP (ref 5–8)
PLATELET # BLD AUTO: 189 K/UL — SIGNIFICANT CHANGE UP (ref 130–400)
POTASSIUM SERPL-MCNC: 3.8 MMOL/L — SIGNIFICANT CHANGE UP (ref 3.5–5)
POTASSIUM SERPL-SCNC: 3.8 MMOL/L — SIGNIFICANT CHANGE UP (ref 3.5–5)
PROT SERPL-MCNC: 4.7 G/DL — LOW (ref 6–8)
PROT UR-MCNC: 100 MG/DL
RBC # BLD: 2.96 M/UL — LOW (ref 4.2–5.4)
RBC # FLD: 19.5 % — HIGH (ref 11.5–14.5)
RBC CASTS # UR COMP ASSIST: SIGNIFICANT CHANGE UP /HPF
SODIUM SERPL-SCNC: 142 MMOL/L — SIGNIFICANT CHANGE UP (ref 135–146)
SP GR SPEC: 1.02 — SIGNIFICANT CHANGE UP (ref 1.01–1.03)
UROBILINOGEN FLD QL: 0.2 MG/DL — SIGNIFICANT CHANGE UP (ref 0.2–0.2)
WBC # BLD: 10.41 K/UL — SIGNIFICANT CHANGE UP (ref 4.8–10.8)
WBC # FLD AUTO: 10.41 K/UL — SIGNIFICANT CHANGE UP (ref 4.8–10.8)
WBC UR QL: SIGNIFICANT CHANGE UP /HPF

## 2021-04-20 PROCEDURE — 99232 SBSQ HOSP IP/OBS MODERATE 35: CPT

## 2021-04-20 RX ORDER — ACETAMINOPHEN 500 MG
650 TABLET ORAL EVERY 6 HOURS
Refills: 0 | Status: DISCONTINUED | OUTPATIENT
Start: 2021-04-20 | End: 2021-04-22

## 2021-04-20 RX ORDER — PANTOPRAZOLE SODIUM 20 MG/1
40 TABLET, DELAYED RELEASE ORAL EVERY 12 HOURS
Refills: 0 | Status: DISCONTINUED | OUTPATIENT
Start: 2021-04-20 | End: 2021-04-22

## 2021-04-20 RX ADMIN — Medication 100 MILLIGRAM(S): at 13:43

## 2021-04-20 RX ADMIN — Medication 100 MILLIGRAM(S): at 05:24

## 2021-04-20 RX ADMIN — CEFTRIAXONE 100 MILLIGRAM(S): 500 INJECTION, POWDER, FOR SOLUTION INTRAMUSCULAR; INTRAVENOUS at 11:16

## 2021-04-20 RX ADMIN — CHLORHEXIDINE GLUCONATE 1 APPLICATION(S): 213 SOLUTION TOPICAL at 08:39

## 2021-04-20 RX ADMIN — PANTOPRAZOLE SODIUM 40 MILLIGRAM(S): 20 TABLET, DELAYED RELEASE ORAL at 17:20

## 2021-04-20 RX ADMIN — Medication 25 MILLIGRAM(S): at 05:23

## 2021-04-20 RX ADMIN — Medication 25 MILLIGRAM(S): at 17:20

## 2021-04-20 RX ADMIN — Medication 100 MILLIGRAM(S): at 21:21

## 2021-04-20 NOTE — PROGRESS NOTE ADULT - ASSESSMENT
89yFemale pmh A-Fib on Eliquis last dose yesterday, GERD, HTN presents for fall. Patient reports intermittent dark brown stools and GI was called for her anemia.    Problem 1-Anemia with intermittent dark stools as per patient  ddx PUD, malignancy, retroperitoneal hemorrhage, diverticulosis  -rectal exam without evidence for active GI bleeding   Free intraperitoneal air in the upper abdomen suggestive of bowel perforation of unclear origin.  Rec  -monitor patient, continued diet resumption as per surgery  -EGD in one-two months after healing of perforation   -patient tolerating full liquids   -Transfuse prn to hgb >8  -Two large bore IV lines  -PPI IV BID  -abx  -Monitor Vital Signs  - Follow up with our GI office located on [address] [place] [address], Phone number [phone] with Dr. Jama    Problem 2-Altered liver chemistries   -distended gallbladder  ddx cholestasis of sepsis, rhabdo , infectious etiology  Rec  -RUQ ultrasound appreciated WNL no cbd dilation   -surgery on board  -If LFTs fail to improve Check Hepatitis B Sag, Hep B SAB, Hep A Ab, Hep C Ab,Smooth Muscle Antibody, Anti LK M1 antibody, AMA, KEVIN, Ceruloplasmin, Ferritin, Alpha-1-antitrypsin, CMV, Herpes serologies, EBV serologies,     89yFemale pmh A-Fib on Eliquis last dose yesterday, GERD, HTN presents for fall. Patient reports intermittent dark brown stools and GI was called for her anemia.    Problem 1-Anemia with intermittent dark stools as per patient  ddx PUD, malignancy, retroperitoneal hemorrhage, diverticulosis  -rectal exam without evidence for active GI bleeding   Free intraperitoneal air in the upper abdomen suggestive of bowel perforation of unclear origin.  Rec  -monitor patient, continued diet resumption as per surgery  -EGD in one-two months after healing of perforation   -patient tolerating full liquids   -Transfuse prn to hgb >8  -Two large bore IV lines  -PPI IV BID  -abx  -Monitor Vital Signs  - Follow up with our GI office located on [address] [place] [address], Phone number [phone] with Dr. Jama    Problem 2-Altered liver chemistries   -distended gallbladder  ddx cholestasis of sepsis, rhabdo , infectious etiology  Rec  -LFTs improving  -RUQ ultrasound appreciated WNL no cbd dilation   -If LFTs fail to improve Check Hepatitis B Sag, Hep B SAB, Hep A Ab, Hep C Ab,Smooth Muscle Antibody, Anti LK M1 antibody, AMA, KEVIN, Ceruloplasmin, Ferritin, Alpha-1-antitrypsin, CMV, Herpes serologies, EBV serologies,

## 2021-04-20 NOTE — PROGRESS NOTE ADULT - SUBJECTIVE AND OBJECTIVE BOX
RAVICECILLE  89y  Female      Patient is a 89y old  Female who presents with a chief complaint of Anemia , Afib, tyrese, acidosis (20 Apr 2021 07:50)        REVIEW OF SYSTEMS:  CONSTITUTIONAL: No fever, weight loss, or fatigue  EYES: No eye pain, visual disturbances, or discharge  ENMT:  No difficulty hearing, tinnitus, vertigo; No sinus or throat pain  NECK: No pain or stiffness  BREASTS: No pain, masses, or nipple discharge  RESPIRATORY: No cough, wheezing, chills or hemoptysis; No shortness of breath  CARDIOVASCULAR: No chest pain, palpitations, dizziness, or leg swelling  GASTROINTESTINAL: No abdominal or epigastric pain. No nausea, vomiting, or hematemesis;   GENITOURINARY: No dysuria, frequency, hematuria, or incontinence  NEUROLOGICAL: No headaches, memory loss, loss of strength, numbness, or tremors  SKIN: No itching, burning, rashes, or lesions   MUSCULOSKELETAL: No joint pain or swelling; No muscle, back, or extremity pain  PSYCHIATRIC: No depression, anxiety, mood swings, or difficulty sleeping  HEME/LYMPH: No easy bruising, or bleeding gums  ALLERY AND IMMUNOLOGIC: No hives or eczema  FAMILY HISTORY:  No pertinent family history in first degree relatives      T(C): 36.1 (04-20-21 @ 07:01), Max: 36.1 (04-20-21 @ 07:01)  HR: 89 (04-20-21 @ 07:14) (78 - 112)  BP: 152/76 (04-20-21 @ 07:14) (117/58 - 161/69)  RR: 22 (04-20-21 @ 07:14) (18 - 24)  SpO2: 98% (04-20-21 @ 07:14) (97% - 99%)  Wt(kg): --Vital Signs Last 24 Hrs  T(C): 36.1 (20 Apr 2021 07:01), Max: 36.1 (20 Apr 2021 07:01)  T(F): 96.9 (20 Apr 2021 07:01), Max: 96.9 (20 Apr 2021 07:01)  HR: 89 (20 Apr 2021 07:14) (78 - 112)  BP: 152/76 (20 Apr 2021 07:14) (117/58 - 161/69)  BP(mean): 107 (20 Apr 2021 07:14) (83 - 108)  RR: 22 (20 Apr 2021 07:14) (18 - 24)  SpO2: 98% (20 Apr 2021 07:14) (97% - 99%)  No Known Allergies      PHYSICAL EXAM:  GENERAL: NAD,   HEAD:  Atraumatic, Normocephalic  EYES: EOMI, PERRLA, conjunctiva and sclera clear  ENMT: No tonsillar erythema, exudates, or enlargement;   NECK: Supple, No JVD, Normal thyroid  NERVOUS SYSTEM:  Alert & Oriented X3  CHEST/LUNG: Clear to percussion bilaterally; No rales, rhonchi, wheezing, or rubs  HEART: Regular rate and rhythm; No murmurs, rubs, or gallops  ABDOMEN: Soft, Nontender, Nondistended; Bowel sounds present  EXTREMITIES:  , No clubbing, cyanosis, or edema  LYMPH: No lymphadenopathy noted  SKIN: No rashes or lesions      LABS:  04-20    142  |  111<H>  |  28<H>  ----------------------------<  92  3.8   |  21  |  1.4    Ca    8.0<L>      20 Apr 2021 05:30  Mg     1.8     04-20    TPro  4.7<L>  /  Alb  2.6<L>  /  TBili  0.7  /  DBili  x   /  AST  136<H>  /  ALT  61<H>  /  AlkPhos  65  04-20                          9.0    10.41 )-----------( 189      ( 20 Apr 2021 05:30 )             28.5     < from: US Abdomen Limited (04.19.21 @ 12:15) >  Sludge within the gallbladder. Nonobstructing right renal calculus. Without significant change.    < end of copied text >      RADIOLOGY & ADDITIONAL TESTS:    MEDICATION:  acetaminophen   Tablet .. 650 milliGRAM(s) Oral every 6 hours PRN  acetaminophen   Tablet .. 650 milliGRAM(s) Oral every 6 hours PRN  ALPRAZolam 0.25 milliGRAM(s) Oral daily PRN  cefTRIAXone   IVPB 2000 milliGRAM(s) IV Intermittent every 24 hours  chlorhexidine 4% Liquid 1 Application(s) Topical <User Schedule>  metoprolol tartrate 25 milliGRAM(s) Oral two times a day  metroNIDAZOLE  IVPB 500 milliGRAM(s) IV Intermittent every 8 hours  pantoprazole  Injectable 40 milliGRAM(s) IV Push every 12 hours      HEALTH ISSUES - PROBLEM Dx:    s/p perforated bowel on diet tolerating,rocephin,metronidazole  anemia acute blood loss s/p 2 units prbc fu for egd as out pt,gi note appreciated  hx a fib xarelto on hold metoprolol  gerd ?ulcer on protonix  generalized weakness pt/ot  TYRESE s/p intravenous fluids improving  mild dysphagia follow up for thin liquid

## 2021-04-20 NOTE — PROGRESS NOTE ADULT - SUBJECTIVE AND OBJECTIVE BOX
Patient is a 89y old  Female who presents with a chief complaint of Anemia , Afib, leeann, acidosis (2021 08:27)      Over Night Events:  Patient seen and examined.   feel better awake   tolerate diet     ROS:  See HPI    PHYSICAL EXAM    ICU Vital Signs Last 24 Hrs  T(C): 36.1 (2021 07:01), Max: 36.1 (2021 07:01)  T(F): 96.9 (2021 07:01), Max: 96.9 (2021 07:01)  HR: 89 (2021 07:14) (78 - 112)  BP: 152/76 (2021 07:14) (117/58 - 161/69)  BP(mean): 107 (2021 07:14) (83 - 108)  ABP: --  ABP(mean): --  RR: 22 (2021 07:14) (18 - 24)  SpO2: 98% (2021 07:14) (97% - 99%)      General:Awake at baseline   HEENT:     alma           Lymph Nodes: NO cervical LN   Lungs: Bilateral BS  Cardiovascular: Regular   Abdomen: Soft, Positive BS  Extremities: No clubbing   Skin: warm   Neurological:   Musculoskeletal: move all ext     I&O's Detail    2021 07:01  -  2021 07:00  --------------------------------------------------------  IN:    IV PiggyBack: 200 mL    Oral Fluid: 660 mL    Pantoprazole: 240 mL    sodium chloride 0.45%: 150 mL    sodium chloride 0.45%: 200 mL  Total IN: 1450 mL    OUT:    Indwelling Catheter - Urethral (mL): 925 mL  Total OUT: 925 mL    Total NET: 525 mL      2021 07:01  -  2021 08:36  --------------------------------------------------------  IN:    Oral Fluid: 240 mL  Total IN: 240 mL    OUT:  Total OUT: 0 mL    Total NET: 240 mL          LABS:                          9.0    10.41 )-----------( 189      ( 2021 05:30 )             28.5         2021 05:30    142    |  111    |  28     ----------------------------<  92     3.8     |  21     |  1.4      Ca    8.0        2021 05:30  Mg     1.8       2021 05:30    TPro  4.7    /  Alb  2.6    /  TBili  0.7    /  DBili  x      /  AST  136    /  ALT  61     /  AlkPhos  65     2021 05:30  Amylase x     lipase x                                                                                        Urinalysis Basic - ( 2021 12:00 )    Color: Yellow / Appearance: Clear / S.025 / pH: x  Gluc: x / Ketone: Negative  / Bili: Negative / Urobili: 1.0 mg/dL   Blood: x / Protein: >=300 mg/dL / Nitrite: Negative   Leuk Esterase: Negative / RBC: 6-10 /HPF / WBC 3-5 /HPF   Sq Epi: x / Non Sq Epi: Few /HPF / Bacteria: Few          CARDIAC MARKERS ( 2021 05:36 )  x     / x     / 6925 U/L / x     / x                                                                                                                                                 MEDICATIONS  (STANDING):  cefTRIAXone   IVPB 2000 milliGRAM(s) IV Intermittent every 24 hours  chlorhexidine 4% Liquid 1 Application(s) Topical <User Schedule>  metoprolol tartrate 25 milliGRAM(s) Oral two times a day  metroNIDAZOLE  IVPB 500 milliGRAM(s) IV Intermittent every 8 hours  pantoprazole  Injectable 40 milliGRAM(s) IV Push every 12 hours    MEDICATIONS  (PRN):  acetaminophen   Tablet .. 650 milliGRAM(s) Oral every 6 hours PRN Temp greater or equal to 38C (100.4F)  acetaminophen   Tablet .. 650 milliGRAM(s) Oral every 6 hours PRN Mild Pain (1 - 3)  ALPRAZolam 0.25 milliGRAM(s) Oral daily PRN anxiety          Xrays:  TLC:  OG:  ET tube:                                                                                       ECHO:  [facility] ICU:

## 2021-04-20 NOTE — PROGRESS NOTE ADULT - ASSESSMENT
Patient ms better.  Check cbc lytes.  If able oob to chair. D/C huggins. Check cxr. Correct k if needed. Medical rx for now. Out patient GI W/U when able. Recheck LFT. Ambulate if stable.  Patient ms better.  Check cbc lytes.  If able oob to chair. D/C huggins. Check cxr. Correct k if needed. Medical rx for now. Out patient GI W/U when able. Recheck LFT.  Recheck cpk. Ambulate if stable.

## 2021-04-20 NOTE — PROGRESS NOTE ADULT - ASSESSMENT
IMPRESSION:  Hypoglycemia - RESOLVED   GI bleed s/p 2 unit of PRBCs total  Possible microperforation  Sepsis  Rhabdomyolysis  Acute kidney injury - Improving   H/o of AFib on xarelto      PLAN:    CNS: avoid depressants    HEENT:  Oral care    PULMONARY:  HOB @ 45 degrees    CARDIOVASCULAR:  Strict I&Os.    GI:  Continue IV protonix 40 mg IV BID.  Tolerating PO diet. Seen by speech and swallow.                               RENAL:  F/u  lytes.  Correct as needed.  Nephrology following.     INFECTIOUS DISEASE: Continue rocephin/flagyl. F/u cultures.  ID following.     HEMATOLOGICAL:  hold xarelto, SCD.    ENDOCRINE:  Follow up FS.      MUSCULOSKELETAL: increase activity as tolerated    CODE STATUS: FULL CODE.   Acute, from home.   Maintain on telemetry.

## 2021-04-20 NOTE — PROGRESS NOTE ADULT - ASSESSMENT
IMPRESSION:  hypoglycemia  GI bleed s/p 1 unit of PRBCs  Possible microperforation  sepsis  hypotension  Rhabdomyolysis  Acute kidney injury  H/o of AFib on xarelto.      PLAN:    CNS: avoid depressants    HEENT:  Oral care    PULMONARY:  HOB @ 45 degrees    CARDIOVASCULAR: keep is = os oral feed    metoprolol home dose     GI: GI prophylaxis  ,   speech and swallow feeding   follow Gi and GSx     RENAL:  F/u  lytes. d/c huggins   replace K   Correct as needed.   accurate I/O    INFECTIOUS DISEASE:  continue rocephin/flagyl.   F/u cultures.    HEMATOLOGICAL:  DVT prophylaxis. off xarelto for Gi bleed   follow with Gi if can resume AC or at least for dvt prophylaxis I    ENDOCRINE:  Follow up FS.  Insulin protocol if needed.    MUSCULOSKELETAL: bed rest     CODE STATUS: FULL CODE  transfer to floor

## 2021-04-20 NOTE — PROGRESS NOTE ADULT - SUBJECTIVE AND OBJECTIVE BOX
Patient is a 89y old  Female who presents with a chief complaint of Anemia , Afib, leeann, acidosis (19 Apr 2021 13:35)      T(F): 96.9 (04-20-21 @ 07:01), Max: 96.9 (04-20-21 @ 07:01)  HR: 100 (04-20-21 @ 04:40)  BP: 151/78 (04-20-21 @ 04:40)  RR: 20 (04-20-21 @ 07:01)  SpO2: 98% (04-20-21 @ 04:40) (97% - 99%)    PHYSICAL EXAM:  GENERAL: NAD, well-groomed, well-developed  HEAD:  Atraumatic, Normocephalic  EYES: EOMI, PERRLA, conjunctiva and sclera clear  ENMT: No tonsillar erythema, exudates, or enlargement; Moist mucous membranes, Good dentition, No lesions  NECK: Supple, No JVD, Normal thyroid  NERVOUS SYSTEM:  Alert & Oriented X3,  Motor Strength 5/5 B/L upper and lower extremities  CHEST/LUNG: Clear to percussion bilaterally; No rales, rhonchi, wheezing, or rubs  HEART: Regular rate and rhythm; No murmurs, rubs, or gallops  ABDOMEN: Soft, Nontender, Nondistended; Bowel sounds present  EXTREMITIES:   No clubbing, cyanosis, or edema  LYMPH: No lymphadenopathy noted  SKIN: No rashes or lesions    labs  04-20    142  |  111<H>  |  28<H>  ----------------------------<  92  3.8   |  21  |  1.4    Ca    8.0<L>      20 Apr 2021 05:30  Mg     1.8     04-20    TPro  4.7<L>  /  Alb  2.6<L>  /  TBili  0.7  /  DBili  x   /  AST  136<H>  /  ALT  61<H>  /  AlkPhos  65  04-20                          9.0    10.41 )-----------( 189      ( 20 Apr 2021 05:30 )             28.5               acetaminophen   Tablet .. 650 milliGRAM(s) Oral every 6 hours PRN  acetaminophen   Tablet .. 650 milliGRAM(s) Oral every 6 hours PRN  ALPRAZolam 0.25 milliGRAM(s) Oral daily PRN  cefTRIAXone   IVPB 2000 milliGRAM(s) IV Intermittent every 24 hours  chlorhexidine 4% Liquid 1 Application(s) Topical <User Schedule>  metoprolol tartrate 25 milliGRAM(s) Oral two times a day  metroNIDAZOLE  IVPB 500 milliGRAM(s) IV Intermittent every 8 hours  pantoprazole  Injectable 40 milliGRAM(s) IV Push every 12 hours

## 2021-04-20 NOTE — PROGRESS NOTE ADULT - SUBJECTIVE AND OBJECTIVE BOX
SUBJECTIVE:    Patient is a 88 y/o Female who presents to [facility] after being found unresponsive at home.     Currently admitted to medicine with the primary diagnosis of GI bleed.     Today is hospital day 5. Patient was seen and examined at bedside. This morning she is resting comfortably in bed.  No overnight events. Mentating well.     PAST MEDICAL & SURGICAL HISTORY  PAST MEDICAL & SURGICAL HISTORY:  Atrial fibrillation    GERD (gastroesophageal reflux disease)    HTN (hypertension)    No significant past surgical history    SOCIAL HISTORY:  Lives alone  NO smoking   No etoh    ALLERGIES:  No Known Allergies    MEDICATIONS:  MEDICATIONS  (STANDING):  cefTRIAXone   IVPB 2000 milliGRAM(s) IV Intermittent every 24 hours  chlorhexidine 4% Liquid 1 Application(s) Topical <User Schedule>  metoprolol tartrate 25 milliGRAM(s) Oral two times a day  metroNIDAZOLE  IVPB 500 milliGRAM(s) IV Intermittent every 8 hours  pantoprazole  Injectable 40 milliGRAM(s) IV Push every 12 hours    MEDICATIONS  (PRN):  acetaminophen   Tablet .. 650 milliGRAM(s) Oral every 6 hours PRN Temp greater or equal to 38C (100.4F)  acetaminophen   Tablet .. 650 milliGRAM(s) Oral every 6 hours PRN Mild Pain (1 - 3)  ALPRAZolam 0.25 milliGRAM(s) Oral daily PRN anxiety    VITALS:   Vital Signs (24 Hrs):  T(C): 36.1 (21 @ 07:01), Max: 36.1 (21 @ 07:01)  HR: 89 (21 @ 07:14) (87 - 112)  BP: 152/76 (21 @ 07:14) (136/63 - 161/69)  RR: 22 (21 @ 07:14) (18 - 24)  SpO2: 98% (21 @ 07:14) (97% - 99%)  Wt(kg): --  Daily     Daily Weight in k (2021 07:01)    I&O's Summary    2021 07:01  -  2021 07:00  --------------------------------------------------------  IN: 1450 mL / OUT: 925 mL / NET: 525 mL    2021 07:01  -  2021 11:18  --------------------------------------------------------  IN: 240 mL / OUT: 0 mL / NET: 240 mL        LABS:                                           9.0    10.41 )-----------( 189      ( 2021 05:30 )             28.5           142  |  111<H>  |  28<H>  ----------------------------<  92  3.8   |  21  |  1.4    Ca    8.0<L>      2021 05:30  Mg     1.8         TPro  4.7<L>  /  Alb  2.6<L>  /  TBili  0.7  /  DBili  x   /  AST  136<H>  /  ALT  61<H>  /  AlkPhos  65          RADIOLOGY:  < from: US Abdomen Limited (21 @ 12:15) >    IMPRESSION:    Sludge within the gallbladder. Nonobstructing right renal calculus. Without significant change.      < end of copied text >  < from: CT Head No Cont (21 @ 11:55) >    IMPRESSION:  No evidence of acute transcortical infarct, acute intracranial hemorrhage, or mass effect.          < end of copied text >  < from: CT Chest No Cont (04.15.21 @ 14:43) >    IMPRESSION:    Free intraperitoneal air in the upper abdomen suggestive of bowel perforation of unclear origin.    Age indeterminate compression deformity of L1 as well as age-indeterminate cortical irregularity to the coccyx. Findings are consistent with fracture of indeterminate age. Clinical correlation for point tenderness is recommended.    Distended gallbladder    Indeterminate fat-containing lesion within the right hip measuring 10 cm        < end of copied text >        PHYSICAL EXAM:  GENERAL: NAD, speaks in full sentences, no signs of respiratory distress  HEAD: Atraumatic  NECK: Supple  CHEST/LUNG: Clear to auscultation bilaterally; No wheeze or crackles  HEART: S1, S2; RRR; No murmurs, rubs, or gallops  ABDOMEN: BS+; Soft, Non-tender, Non-distended  EXTREMITIES:  2+ Peripheral Pulses, No clubbing, cyanosis, or edema  NEUROLOGY: non-focal  SKIN: No rashes or lesions

## 2021-04-20 NOTE — PROGRESS NOTE ADULT - SUBJECTIVE AND OBJECTIVE BOX
89yFemale  Being followed for abnormal CT scan findings   Interval history: Patient denies nausea, vomiting, hematemesis, melena, blood in stool, diarrhea, constipation, abdominal pain. Patient tolerating full liquids and has no complaints.      PAST MEDICAL & SURGICAL HISTORY:   Atrial fibrillation    GERD (gastroesophageal reflux disease)    HTN (hypertension)    No significant past surgical history            Social History: No smoking. No alcohol. No illegal drug use.            MEDICATIONS  (STANDING):  cefTRIAXone   IVPB 2000 milliGRAM(s) IV Intermittent every 24 hours  chlorhexidine 4% Liquid 1 Application(s) Topical <User Schedule>  metoprolol tartrate 25 milliGRAM(s) Oral two times a day  metroNIDAZOLE  IVPB 500 milliGRAM(s) IV Intermittent every 8 hours  pantoprazole  Injectable 40 milliGRAM(s) IV Push every 12 hours    MEDICATIONS  (PRN):  acetaminophen   Tablet .. 650 milliGRAM(s) Oral every 6 hours PRN Temp greater or equal to 38C (100.4F)  acetaminophen   Tablet .. 650 milliGRAM(s) Oral every 6 hours PRN Mild Pain (1 - 3)  ALPRAZolam 0.25 milliGRAM(s) Oral daily PRN anxiety      Allergies:   No Known Allergies            REVIEW OF SYSTEMS:  General:  No weight loss, fevers, or chills.  Eyes:  No reported pain or visual changes  ENT:  No sore throat or runny nose.  NECK: No stiffness   CV:  No chest pain or palpitations.  Resp:  No shortness of breath, cough  GI:  No abdominal pain, nausea, vomiting, dysphagia, diarrhea or constipation. No rectal bleeding, melena, or hematemesis.  Neuro:  No tingling, numbness         VITAL SIGNS:   T(F): 96.9 (04-20-21 @ 07:01), Max: 96.9 (04-20-21 @ 07:01)  HR: 89 (04-20-21 @ 07:14) (87 - 112)  BP: 152/76 (04-20-21 @ 07:14) (136/63 - 161/69)  RR: 22 (04-20-21 @ 07:14) (18 - 24)  SpO2: 98% (04-20-21 @ 07:14) (97% - 99%)    PHYSICAL EXAM:  GENERAL: AAOx3, no acute distress.  HEAD:  Atraumatic, Normocephalic  EYES: conjunctiva and sclera clear  NECK: Supple, no JVD or thyromegaly  CHEST/LUNG: Clear to auscultation bilaterally; No wheeze, rhonchi, or rales  HEART: Regular rate and rhythm; normal S1, S2, No murmurs.  ABDOMEN: Soft, nontender, nondistended; Bowel sounds present  NEUROLOGY: No asterixis or tremor.   SKIN: Intact, no jaundice            LABS:                        9.0    10.41 )-----------( 189      ( 20 Apr 2021 05:30 )             28.5     04-20    142  |  111<H>  |  28<H>  ----------------------------<  92  3.8   |  21  |  1.4    Ca    8.0<L>      20 Apr 2021 05:30  Mg     1.8     04-20    TPro  4.7<L>  /  Alb  2.6<L>  /  TBili  0.7  /  DBili  x   /  AST  136<H>  /  ALT  61<H>  /  AlkPhos  65  04-20    LIVER FUNCTIONS - ( 20 Apr 2021 05:30 )  Alb: 2.6 g/dL / Pro: 4.7 g/dL / ALK PHOS: 65 U/L / ALT: 61 U/L / AST: 136 U/L / GGT: x               IMAGING:    < from: CT Abdomen and Pelvis No Cont (04.15.21 @ 14:43) >    EXAM:  CT CHEST        EXAM:  CT ABDOMEN AND PELVIS            PROCEDURE DATE:  04/15/2021            INTERPRETATION:  CLINICAL STATEMENT: Status post fall    TECHNIQUE: Contiguous axial CT images were obtained from the chest to the pubic symphysiswithout intravenous contrast.  Oral contrast was not given.  Reformatted images in the coronal and sagittal planes were acquired. Additional MIP images were obtained.    COMPARISON CT: None.    Study is severely limited in evaluation due to motion artifact.      FINDINGS:    CHEST: There is a trace nonspecific pericardial effusion. The thyroid gland is present with the right thyroid extending into the superior mediastinum. The thyroid gland is incompletely evaluated on CT. There is no definite evidence of thoracic adenopathy. The heart size is enlarged. There is atherosclerosis. The thoracic aorta is normal in caliber. The central tracheobronchial tree is patent. There is no consolidation, pneumothorax or pleural effusion. There are patchy areas of subsegmental atelectasis.    HEPATOBILIARY: The gallbladder is distended.    SPLEEN: Unremarkable..    PANCREAS: Fatty infiltration of the pancreas.    ADRENAL GLANDS: The left adrenal gland is thickened. The right adrenal gland is unremarkable.    KIDNEYS: The left kidney is atrophic. There may be a right parapelvic cyst, limited in evaluation due to motion. There is no hydronephrosis. There are subcentimeter renal hypodensities, too small to characterize.    ABDOMINOPELVIC NODES: Unremarkable..    PELVIC ORGANS: The uterus and adnexa are incompletely evaluated on CT.    PERITONEUM/MESENTERY/BOWEL: There are punctate foci of free intraperitoneal air in the upper abdomen, for example in the right upper quadrant on image 185 of series 3 and in the left upper quadrant image 177 of series 3. There is no evidence of obstruction.    BONES/SOFT TISSUES: There is cortical irregularity to the coccyx on image 380 of series 3 consistent with fracture of indeterminate age. Age indeterminate compression deformity of L1. Clinical correlation is recommended. There are degenerative changes. There is a scoliosis. Evaluation of the ribs for traumatic injury is limited due to motion artifact; nondisplaced fractures are difficult to exclude. Thereis a large fat-containing right hip lesion measuring 10 cm, indeterminate.    OTHER: There are abdominal pelvic vascular calcifications. The infrarenal abdominal aorta measures up to 2.4 cm..      IMPRESSION:    Free intraperitoneal air in the upper abdomen suggestive of bowel perforation of unclear origin.    Age indeterminate compression deformity of L1 as well as age-indeterminate cortical irregularity to the coccyx. Findings are consistent with fracture of indeterminate age. Clinical correlation for point tenderness is recommended.    Distended gallbladder    Indeterminate fat-containing lesion within the right hip measuring 10 cm          Spoke with PILLO MOROCHO PA on 4/15/2021 3:41 PM with readback.              DERECK GIORDANO MD; Attending Radiologist  This document has been electronically signed. Apr 15 2021  3:49PM    < end of copied text >

## 2021-04-20 NOTE — PROGRESS NOTE ADULT - SUBJECTIVE AND OBJECTIVE BOX
Nephrology Progress Note    CECILLE RODRIGUES  MRN-[id number]  89y  Female    S:  Patient is seen and examined, events over the last 24h noted.    O:  Allergies:  No Known Allergies    Hospital Medications:   MEDICATIONS  (STANDING):  cefTRIAXone   IVPB 2000 milliGRAM(s) IV Intermittent every 24 hours  chlorhexidine 4% Liquid 1 Application(s) Topical <User Schedule>  metoprolol tartrate 25 milliGRAM(s) Oral two times a day  metroNIDAZOLE  IVPB 500 milliGRAM(s) IV Intermittent every 8 hours  pantoprazole  Injectable 40 milliGRAM(s) IV Push every 12 hours    MEDICATIONS  (PRN):  acetaminophen   Tablet .. 650 milliGRAM(s) Oral every 6 hours PRN Temp greater or equal to 38C (100.4F)  acetaminophen   Tablet .. 650 milliGRAM(s) Oral every 6 hours PRN Mild Pain (1 - 3)  ALPRAZolam 0.25 milliGRAM(s) Oral daily PRN anxiety    Home Medications:  Home Medications:  ALPRAZOLAM 0.25 MG TABLET: 1 tab(s) orally once a day, As Needed (15 Apr 2021 21:07)  EZETIMIBE 10 MG TABLET: TAKE 1 TABLET BY MOUTH EVERY DAY (15 Apr 2021 21:01)  LOSARTAN POTASSIUM 100 MG TAB: TAKE 1 TABLET BY MOUTH ONCE DAILY (15 Apr 2021 21:01)  METOPROLOL TARTRATE 50 MG TAB: 1.5 tab(s) orally once a day (15 Apr 2021 21:05)  ROSUVASTATIN CALCIUM 20 MG TAB: TAKE 1 TABLET BY MOUTH EVERY DAY (15 Apr 2021 21:01)  XARELTO 15 MG TABLET: TAKE 1 TABLET BY MOUTH EVERY DAY (15 Apr 2021 21:01)      VITALS:  Daily     Daily Weight in k (2021 07:01)  T(F): 96.9 (21 @ 07:01), Max: 96.9 (21 @ 07:01)  HR: 89 (21 @ 07:14)  BP: 152/76 (21 @ 07:14)  RR: 22 (21 @ 07:14)  SpO2: 98% (21 @ 07:14)  Wt(kg): --  I&O's Detail    2021 07:01  -  2021 07:00  --------------------------------------------------------  IN:    IV PiggyBack: 200 mL    Oral Fluid: 660 mL    Pantoprazole: 240 mL    sodium chloride 0.45%: 150 mL    sodium chloride 0.45%: 200 mL  Total IN: 1450 mL    OUT:    Indwelling Catheter - Urethral (mL): 925 mL  Total OUT: 925 mL    Total NET: 525 mL      2021 07:  -  2021 15:04  --------------------------------------------------------  IN:    Oral Fluid: 450 mL  Total IN: 450 mL    OUT:    Indwelling Catheter - Urethral (mL): 150 mL  Total OUT: 150 mL    Total NET: 300 mL        I&O's Summary    2021 07:  -  2021 07:00  --------------------------------------------------------  IN: 1450 mL / OUT: 925 mL / NET: 525 mL    2021 07:  -  2021 15:04  --------------------------------------------------------  IN: 450 mL / OUT: 150 mL / NET: 300 mL          PHYSICAL EXAM:  Gen: NAD  Resp: CTAB  Card: S1/S2  Abd: soft  Extremities: no edema   Carter      LABS:    Blood Gas Profile w/Lytes - Venous: Performed in Lab (04-15-21 @ 18:25)  Blood Gas Venous - Sodium: 143 mmoL/L (04-15-21 @ 18:25)  Blood Gas Venous - Potassium: 4.5 mmoL/L (04-15-21 @ 18:25)    04-20    142  |  111<H>  |  28<H>  ----------------------------<  92  3.8   |  21  |  1.4    Ca    8.0<L>      2021 05:30  Mg     1.8         TPro  4.7<L>  /  Alb  2.6<L>  /  TBili  0.7  /  DBili      /  AST  136<H>  /  ALT  61<H>  /  AlkPhos  65      eGFR if Non African American: 33 mL/min/1.73M2 (21 @ 05:30)  eGFR if : 39 mL/min/1.73M2 (21 @ 05:30)    Creatine Kinase, Serum: 6925 U/L (21 @ 05:36)  Creatine Kinase, Serum: 8688 U/L (21 @ 05:31)                            9.0    10.41 )-----------( 189      ( 2021 05:30 )             28.5     Mean Cell Volume: 96.3 fL (21 @ 05:30)        Urine Studies:  Urinalysis Basic - ( 2021 08:00 )    Color: Yellow / Appearance: Clear / S.025 / pH:   Gluc:  / Ketone: Negative  / Bili: Negative / Urobili: 0.2 mg/dL   Blood:  / Protein: 100 mg/dL / Nitrite: Negative   Leuk Esterase: Negative / RBC: 1-2 /HPF / WBC 1-2 /HPF   Sq Epi:  / Non Sq Epi:  / Bacteria: Few      Protein/Creatinine Ratio Calculation: 0.9 Ratio ( @ 13:40)  Creatinine, Random Urine: 104 mg/dL ( @ 13:40)    Creatinine trend:  Creatinine, Serum: 1.4 mg/dL (21 @ 05:30)  Creatinine, Serum: 1.6 mg/dL (21 @ 05:36)  Creatinine, Serum: 1.9 mg/dL (21 @ 05:31)  Creatinine, Serum: 2.3 mg/dL (21 @ 05:32)  Creatinine, Serum: 2.4 mg/dL (21 @ 05:36)  Creatinine, Serum: 2.4 mg/dL (04-15-21 @ 22:02)  Creatinine, Serum: 2.6 mg/dL (04-15-21 @ 12:53)    Hepatitis C Virus Cutoff Index: .04 COI (21 @ 05:30)  Hepatitis C Virus Interpretation Result: Nonreact (21 @ 05:30)    < from: CT Abdomen and Pelvis No Cont (04.15.21 @ 14:43) >    EXAM:  CT CHEST        EXAM:  CT ABDOMEN AND PELVIS            PROCEDURE DATE:  04/15/2021            INTERPRETATION:  CLINICAL STATEMENT: Status post fall    TECHNIQUE: Contiguous axial CT images were obtained from the chest to the pubic symphysiswithout intravenous contrast.  Oral contrast was not given.  Reformatted images in the coronal and sagittal planes were acquired. Additional MIP images were obtained.    COMPARISON CT: None.    Study is severely limited in evaluation due to motion artifact.      FINDINGS:    CHEST: There is a trace nonspecific pericardial effusion. The thyroid gland is present with the right thyroid extending into the superior mediastinum. The thyroid gland is incompletely evaluated on CT. There is no definite evidence of thoracic adenopathy. The heart size is enlarged. There is atherosclerosis. The thoracic aorta is normal in caliber. The central tracheobronchial tree is patent. There is no consolidation, pneumothorax or pleural effusion. There are patchy areas of subsegmental atelectasis.    HEPATOBILIARY: The gallbladder is distended.    SPLEEN: Unremarkable..    PANCREAS: Fatty infiltration of the pancreas.    ADRENAL GLANDS: The left adrenal gland is thickened. The right adrenal gland is unremarkable.    KIDNEYS: The left kidney is atrophic. There may be a right parapelvic cyst, limited in evaluation due to motion. There is no hydronephrosis. There are subcentimeter renal hypodensities, too small to characterize.    ABDOMINOPELVIC NODES: Unremarkable..    PELVIC ORGANS: The uterus and adnexa are incompletely evaluated on CT.    PERITONEUM/MESENTERY/BOWEL: There are punctate foci of free intraperitoneal air in the upper abdomen, for example in the right upper quadrant on image 185 of series 3 and in the left upper quadrant image 177 of series 3. There is no evidence of obstruction.    BONES/SOFT TISSUES: There is cortical irregularity to the coccyx on image 380 of series 3 consistent with fracture of indeterminate age. Age indeterminate compression deformity of L1. Clinical correlation is recommended. There are degenerative changes. There is a scoliosis. Evaluation of the ribs for traumatic injury is limited due to motion artifact; nondisplaced fractures are difficult to exclude. Thereis a large fat-containing right hip lesion measuring 10 cm, indeterminate.    OTHER: There are abdominal pelvic vascular calcifications. The infrarenal abdominal aorta measures up to 2.4 cm..      IMPRESSION:    Free intraperitoneal air in the upper abdomen suggestive of bowel perforation of unclear origin.    Age indeterminate compression deformity of L1 as well as age-indeterminate cortical irregularity to the coccyx. Findings are consistent with fracture of indeterminate age. Clinical correlation for point tenderness is recommended.    Distended gallbladder    Indeterminate fat-containing lesion within the right hip measuring 10 cm          Spoke with PILLO MOROCHO PA on 4/15/2021 3:41 PM with readback.              DERECK GIORDANO MD; Attending Radiologist  This document has been electronically signed. Apr 15 2021  3:49PM    < end of copied text >       Nephrology Progress Note    CECILLE RODRIGUES  MRN-[id number]  89y  Female    S:  Patient is seen and examined, events over the last 24h noted.    O:  Allergies:  No Known Allergies    Hospital Medications:   MEDICATIONS  (STANDING):  cefTRIAXone   IVPB 2000 milliGRAM(s) IV Intermittent every 24 hours  chlorhexidine 4% Liquid 1 Application(s) Topical <User Schedule>  metoprolol tartrate 25 milliGRAM(s) Oral two times a day  metroNIDAZOLE  IVPB 500 milliGRAM(s) IV Intermittent every 8 hours  pantoprazole  Injectable 40 milliGRAM(s) IV Push every 12 hours    MEDICATIONS  (PRN):  acetaminophen   Tablet .. 650 milliGRAM(s) Oral every 6 hours PRN Temp greater or equal to 38C (100.4F)  acetaminophen   Tablet .. 650 milliGRAM(s) Oral every 6 hours PRN Mild Pain (1 - 3)  ALPRAZolam 0.25 milliGRAM(s) Oral daily PRN anxiety    Home Medications:  Home Medications:  ALPRAZOLAM 0.25 MG TABLET: 1 tab(s) orally once a day, As Needed (15 Apr 2021 21:07)  EZETIMIBE 10 MG TABLET: TAKE 1 TABLET BY MOUTH EVERY DAY (15 Apr 2021 21:01)  LOSARTAN POTASSIUM 100 MG TAB: TAKE 1 TABLET BY MOUTH ONCE DAILY (15 Apr 2021 21:01)  METOPROLOL TARTRATE 50 MG TAB: 1.5 tab(s) orally once a day (15 Apr 2021 21:05)  ROSUVASTATIN CALCIUM 20 MG TAB: TAKE 1 TABLET BY MOUTH EVERY DAY (15 Apr 2021 21:01)  XARELTO 15 MG TABLET: TAKE 1 TABLET BY MOUTH EVERY DAY (15 Apr 2021 21:01)      VITALS:  Daily     Daily Weight in k (2021 07:01)  T(F): 96.9 (21 @ 07:01), Max: 96.9 (21 @ 07:01)  HR: 89 (21 @ 07:14)  BP: 152/76 (21 @ 07:14)  RR: 22 (21 @ 07:14)  SpO2: 98% (21 @ 07:14)  Wt(kg): --  I&O's Detail    2021 07:01  -  2021 07:00  --------------------------------------------------------  IN:    IV PiggyBack: 200 mL    Oral Fluid: 660 mL    Pantoprazole: 240 mL    sodium chloride 0.45%: 150 mL    sodium chloride 0.45%: 200 mL  Total IN: 1450 mL    OUT:    Indwelling Catheter - Urethral (mL): 925 mL  Total OUT: 925 mL    Total NET: 525 mL      2021 07:  -  2021 15:04  --------------------------------------------------------  IN:    Oral Fluid: 450 mL  Total IN: 450 mL    OUT:    Indwelling Catheter - Urethral (mL): 150 mL  Total OUT: 150 mL    Total NET: 300 mL        I&O's Summary    2021 07:  -  2021 07:00  --------------------------------------------------------  IN: 1450 mL / OUT: 925 mL / NET: 525 mL    2021 07:  -  2021 15:04  --------------------------------------------------------  IN: 450 mL / OUT: 150 mL / NET: 300 mL          PHYSICAL EXAM:  Gen: NAD  Resp: CTAB  Card: S1/S2  Abd: soft  Extremities: no edema     LABS:    Blood Gas Profile w/Lytes - Venous: Performed in Lab (04-15-21 @ 18:25)  Blood Gas Venous - Sodium: 143 mmoL/L (04-15-21 @ 18:25)  Blood Gas Venous - Potassium: 4.5 mmoL/L (04-15-21 @ 18:25)        142  |  111<H>  |  28<H>  ----------------------------<  92  3.8   |  21  |  1.4    Ca    8.0<L>      2021 05:30  Mg     1.8         TPro  4.7<L>  /  Alb  2.6<L>  /  TBili  0.7  /  DBili      /  AST  136<H>  /  ALT  61<H>  /  AlkPhos  65      eGFR if Non African American: 33 mL/min/1.73M2 (21 @ 05:30)  eGFR if : 39 mL/min/1.73M2 (21 @ 05:30)    Creatine Kinase, Serum: 6925 U/L (21 @ 05:36)  Creatine Kinase, Serum: 8688 U/L (21 @ 05:31)                            9.0    10.41 )-----------( 189      ( 2021 05:30 )             28.5     Mean Cell Volume: 96.3 fL (21 @ 05:30)        Urine Studies:  Urinalysis Basic - ( 2021 08:00 )    Color: Yellow / Appearance: Clear / S.025 / pH:   Gluc:  / Ketone: Negative  / Bili: Negative / Urobili: 0.2 mg/dL   Blood:  / Protein: 100 mg/dL / Nitrite: Negative   Leuk Esterase: Negative / RBC: 1-2 /HPF / WBC 1-2 /HPF   Sq Epi:  / Non Sq Epi:  / Bacteria: Few      Protein/Creatinine Ratio Calculation: 0.9 Ratio ( @ 13:40)  Creatinine, Random Urine: 104 mg/dL ( @ 13:40)    Creatinine trend:  Creatinine, Serum: 1.4 mg/dL (21 @ 05:30)  Creatinine, Serum: 1.6 mg/dL (21 @ 05:36)  Creatinine, Serum: 1.9 mg/dL (21 @ 05:31)  Creatinine, Serum: 2.3 mg/dL (21 @ 05:32)  Creatinine, Serum: 2.4 mg/dL (21 @ 05:36)  Creatinine, Serum: 2.4 mg/dL (04-15-21 @ 22:02)  Creatinine, Serum: 2.6 mg/dL (04-15-21 @ 12:53)    Hepatitis C Virus Cutoff Index: .04 COI (21 @ 05:30)  Hepatitis C Virus Interpretation Result: Nonreact (21 @ 05:30)    < from: CT Abdomen and Pelvis No Cont (04.15.21 @ 14:43) >    EXAM:  CT CHEST        EXAM:  CT ABDOMEN AND PELVIS            PROCEDURE DATE:  04/15/2021            INTERPRETATION:  CLINICAL STATEMENT: Status post fall    TECHNIQUE: Contiguous axial CT images were obtained from the chest to the pubic symphysiswithout intravenous contrast.  Oral contrast was not given.  Reformatted images in the coronal and sagittal planes were acquired. Additional MIP images were obtained.    COMPARISON CT: None.    Study is severely limited in evaluation due to motion artifact.      FINDINGS:    CHEST: There is a trace nonspecific pericardial effusion. The thyroid gland is present with the right thyroid extending into the superior mediastinum. The thyroid gland is incompletely evaluated on CT. There is no definite evidence of thoracic adenopathy. The heart size is enlarged. There is atherosclerosis. The thoracic aorta is normal in caliber. The central tracheobronchial tree is patent. There is no consolidation, pneumothorax or pleural effusion. There are patchy areas of subsegmental atelectasis.    HEPATOBILIARY: The gallbladder is distended.    SPLEEN: Unremarkable..    PANCREAS: Fatty infiltration of the pancreas.    ADRENAL GLANDS: The left adrenal gland is thickened. The right adrenal gland is unremarkable.    KIDNEYS: The left kidney is atrophic. There may be a right parapelvic cyst, limited in evaluation due to motion. There is no hydronephrosis. There are subcentimeter renal hypodensities, too small to characterize.    ABDOMINOPELVIC NODES: Unremarkable..    PELVIC ORGANS: The uterus and adnexa are incompletely evaluated on CT.    PERITONEUM/MESENTERY/BOWEL: There are punctate foci of free intraperitoneal air in the upper abdomen, for example in the right upper quadrant on image 185 of series 3 and in the left upper quadrant image 177 of series 3. There is no evidence of obstruction.    BONES/SOFT TISSUES: There is cortical irregularity to the coccyx on image 380 of series 3 consistent with fracture of indeterminate age. Age indeterminate compression deformity of L1. Clinical correlation is recommended. There are degenerative changes. There is a scoliosis. Evaluation of the ribs for traumatic injury is limited due to motion artifact; nondisplaced fractures are difficult to exclude. Thereis a large fat-containing right hip lesion measuring 10 cm, indeterminate.    OTHER: There are abdominal pelvic vascular calcifications. The infrarenal abdominal aorta measures up to 2.4 cm..      IMPRESSION:    Free intraperitoneal air in the upper abdomen suggestive of bowel perforation of unclear origin.    Age indeterminate compression deformity of L1 as well as age-indeterminate cortical irregularity to the coccyx. Findings are consistent with fracture of indeterminate age. Clinical correlation for point tenderness is recommended.    Distended gallbladder    Indeterminate fat-containing lesion within the right hip measuring 10 cm          Spoke with PILLO MOROCHO PA on 4/15/2021 3:41 PM with readback.              DERECK GIORDANO MD; Attending Radiologist  This document has been electronically signed. Apr 15 2021  3:49PM    < end of copied text >

## 2021-04-21 LAB
A1AT SERPL-MCNC: 205 MG/DL — HIGH (ref 90–200)
ALBUMIN SERPL ELPH-MCNC: 2.6 G/DL — LOW (ref 3.5–5.2)
ALP SERPL-CCNC: 73 U/L — SIGNIFICANT CHANGE UP (ref 30–115)
ALT FLD-CCNC: 57 U/L — HIGH (ref 0–41)
ANION GAP SERPL CALC-SCNC: 8 MMOL/L — SIGNIFICANT CHANGE UP (ref 7–14)
AST SERPL-CCNC: 104 U/L — HIGH (ref 0–41)
BASOPHILS # BLD AUTO: 0.01 K/UL — SIGNIFICANT CHANGE UP (ref 0–0.2)
BASOPHILS NFR BLD AUTO: 0.1 % — SIGNIFICANT CHANGE UP (ref 0–1)
BILIRUB SERPL-MCNC: 0.6 MG/DL — SIGNIFICANT CHANGE UP (ref 0.2–1.2)
BUN SERPL-MCNC: 25 MG/DL — HIGH (ref 10–20)
CALCIUM SERPL-MCNC: 8.2 MG/DL — LOW (ref 8.5–10.1)
CERULOPLASMIN SERPL-MCNC: 23 MG/DL — SIGNIFICANT CHANGE UP (ref 16–45)
CHLORIDE SERPL-SCNC: 112 MMOL/L — HIGH (ref 98–110)
CK SERPL-CCNC: 1746 U/L — HIGH (ref 0–225)
CMV IGG FLD QL: <0.2 U/ML — SIGNIFICANT CHANGE UP
CMV IGG SERPL-IMP: NEGATIVE — SIGNIFICANT CHANGE UP
CMV IGM FLD-ACNC: <8 AU/ML — SIGNIFICANT CHANGE UP
CMV IGM SERPL QL: NEGATIVE — SIGNIFICANT CHANGE UP
CO2 SERPL-SCNC: 23 MMOL/L — SIGNIFICANT CHANGE UP (ref 17–32)
CREAT SERPL-MCNC: 1.4 MG/DL — SIGNIFICANT CHANGE UP (ref 0.7–1.5)
CULTURE RESULTS: NO GROWTH — SIGNIFICANT CHANGE UP
CULTURE RESULTS: SIGNIFICANT CHANGE UP
EBV EA AB SER IA-ACNC: >150 U/ML — HIGH
EBV EA AB TITR SER IF: POSITIVE
EBV EA IGG SER-ACNC: POSITIVE
EBV NA IGG SER IA-ACNC: 153 U/ML — HIGH
EBV PATRN SPEC IB-IMP: SIGNIFICANT CHANGE UP
EBV VCA IGG AVIDITY SER QL IA: POSITIVE
EBV VCA IGM SER IA-ACNC: <10 U/ML — SIGNIFICANT CHANGE UP
EBV VCA IGM SER IA-ACNC: >750 U/ML — HIGH
EBV VCA IGM TITR FLD: NEGATIVE — SIGNIFICANT CHANGE UP
EOSINOPHIL # BLD AUTO: 0.23 K/UL — SIGNIFICANT CHANGE UP (ref 0–0.7)
EOSINOPHIL NFR BLD AUTO: 2.1 % — SIGNIFICANT CHANGE UP (ref 0–8)
FERRITIN SERPL-MCNC: 61 NG/ML — SIGNIFICANT CHANGE UP (ref 15–150)
GLUCOSE SERPL-MCNC: 94 MG/DL — SIGNIFICANT CHANGE UP (ref 70–99)
HAV IGG SER QL IA: SIGNIFICANT CHANGE UP
HAV IGM SER-ACNC: SIGNIFICANT CHANGE UP
HBV SURFACE AB SER-ACNC: SIGNIFICANT CHANGE UP
HBV SURFACE AG SER-ACNC: SIGNIFICANT CHANGE UP
HCT VFR BLD CALC: 30.6 % — LOW (ref 37–47)
HGB BLD-MCNC: 9.4 G/DL — LOW (ref 12–16)
HSV1 IGG SER-ACNC: <0.01 INDEX — SIGNIFICANT CHANGE UP
HSV1 IGG SERPL QL IA: NEGATIVE — SIGNIFICANT CHANGE UP
HSV2 IGG FLD-ACNC: 0.18 INDEX — SIGNIFICANT CHANGE UP
HSV2 IGG SERPL QL IA: NEGATIVE — SIGNIFICANT CHANGE UP
IMM GRANULOCYTES NFR BLD AUTO: 0.6 % — HIGH (ref 0.1–0.3)
LYMPHOCYTES # BLD AUTO: 0.96 K/UL — LOW (ref 1.2–3.4)
LYMPHOCYTES # BLD AUTO: 8.9 % — LOW (ref 20.5–51.1)
MAGNESIUM SERPL-MCNC: 1.8 MG/DL — SIGNIFICANT CHANGE UP (ref 1.8–2.4)
MCHC RBC-ENTMCNC: 29.7 PG — SIGNIFICANT CHANGE UP (ref 27–31)
MCHC RBC-ENTMCNC: 30.7 G/DL — LOW (ref 32–37)
MCV RBC AUTO: 96.5 FL — SIGNIFICANT CHANGE UP (ref 81–99)
MONOCYTES # BLD AUTO: 1.39 K/UL — HIGH (ref 0.1–0.6)
MONOCYTES NFR BLD AUTO: 12.8 % — HIGH (ref 1.7–9.3)
NEUTROPHILS # BLD AUTO: 8.19 K/UL — HIGH (ref 1.4–6.5)
NEUTROPHILS NFR BLD AUTO: 75.5 % — HIGH (ref 42.2–75.2)
NRBC # BLD: 0 /100 WBCS — SIGNIFICANT CHANGE UP (ref 0–0)
PLATELET # BLD AUTO: 233 K/UL — SIGNIFICANT CHANGE UP (ref 130–400)
POTASSIUM SERPL-MCNC: 4 MMOL/L — SIGNIFICANT CHANGE UP (ref 3.5–5)
POTASSIUM SERPL-SCNC: 4 MMOL/L — SIGNIFICANT CHANGE UP (ref 3.5–5)
PROT SERPL-MCNC: 4.8 G/DL — LOW (ref 6–8)
RBC # BLD: 3.17 M/UL — LOW (ref 4.2–5.4)
RBC # FLD: 19.2 % — HIGH (ref 11.5–14.5)
SMOOTH MUSCLE AB SER-ACNC: SIGNIFICANT CHANGE UP
SODIUM SERPL-SCNC: 143 MMOL/L — SIGNIFICANT CHANGE UP (ref 135–146)
SPECIMEN SOURCE: SIGNIFICANT CHANGE UP
SPECIMEN SOURCE: SIGNIFICANT CHANGE UP
WBC # BLD: 10.84 K/UL — HIGH (ref 4.8–10.8)
WBC # FLD AUTO: 10.84 K/UL — HIGH (ref 4.8–10.8)

## 2021-04-21 PROCEDURE — 99233 SBSQ HOSP IP/OBS HIGH 50: CPT

## 2021-04-21 PROCEDURE — 99232 SBSQ HOSP IP/OBS MODERATE 35: CPT

## 2021-04-21 RX ORDER — RIVAROXABAN 15 MG-20MG
15 KIT ORAL DAILY
Refills: 0 | Status: DISCONTINUED | OUTPATIENT
Start: 2021-04-21 | End: 2021-04-22

## 2021-04-21 RX ADMIN — Medication 100 MILLIGRAM(S): at 14:03

## 2021-04-21 RX ADMIN — PANTOPRAZOLE SODIUM 40 MILLIGRAM(S): 20 TABLET, DELAYED RELEASE ORAL at 17:54

## 2021-04-21 RX ADMIN — Medication 25 MILLIGRAM(S): at 17:54

## 2021-04-21 RX ADMIN — Medication 100 MILLIGRAM(S): at 05:28

## 2021-04-21 RX ADMIN — Medication 100 MILLIGRAM(S): at 22:27

## 2021-04-21 RX ADMIN — PANTOPRAZOLE SODIUM 40 MILLIGRAM(S): 20 TABLET, DELAYED RELEASE ORAL at 05:28

## 2021-04-21 RX ADMIN — RIVAROXABAN 15 MILLIGRAM(S): KIT at 12:01

## 2021-04-21 RX ADMIN — CEFTRIAXONE 100 MILLIGRAM(S): 500 INJECTION, POWDER, FOR SOLUTION INTRAMUSCULAR; INTRAVENOUS at 12:00

## 2021-04-21 RX ADMIN — Medication 25 MILLIGRAM(S): at 05:28

## 2021-04-21 RX ADMIN — CHLORHEXIDINE GLUCONATE 1 APPLICATION(S): 213 SOLUTION TOPICAL at 12:01

## 2021-04-21 NOTE — PROGRESS NOTE ADULT - ASSESSMENT
No complaints. She wants to go home. Rehab evaluation. Consider  resume ac today When stable out patient F/u

## 2021-04-21 NOTE — PHARMACOTHERAPY INTERVENTION NOTE - COMMENTS
Relayed the msg that metoprolol tartrate dosed twice daily usually not once daily. Looking at medical chart the prescription was written as metoprolol 50mg bid. Rec to change
rec to consider holding statin in setting of elevated ck
MD okay to resume back pt's xarelto. CrCl 25 mL/min, rec to initiate at 15mg per day

## 2021-04-21 NOTE — PROGRESS NOTE ADULT - ASSESSMENT
89yFemale pmh A-Fib on Eliquis last dose yesterday, GERD, HTN presents for fall. Patient reports intermittent dark brown stools and GI was called for her anemia.    Problem 1-Anemia with intermittent dark stools as per patient  ddx PUD, malignancy, retroperitoneal hemorrhage, diverticulosis  -rectal exam without evidence for active GI bleeding   Free intraperitoneal air in the upper abdomen suggestive of bowel perforation of unclear origin.  Rec  -okay to resume diet as per speech and swallow  -avoid N-SAIDs  -EGD in one-two months after healing of perforation   -patient tolerating full liquids   -Transfuse prn to hgb >8  -Two large bore IV lines  -PPI BID  -abx  -Monitor Vital Signs  - Follow up with our GI office located on [address] [place] [address], Phone number [phone] with Dr. Jama    Problem 2-Altered liver chemistries   -distended gallbladder  ddx cholestasis of sepsis, rhabdo , infectious etiology  Rec  -LFTs improving  -RUQ ultrasound appreciated WNL no cbd dilation   -If LFTs fail to improve Check Hepatitis B Sag, Hep B SAB, Hep A Ab, Hep C Ab,Smooth Muscle Antibody, Anti LK M1 antibody, AMA, KEVIN, Ceruloplasmin, Ferritin, Alpha-1-antitrypsin, CMV, Herpes serologies, EBV serologies,

## 2021-04-21 NOTE — PROGRESS NOTE ADULT - SUBJECTIVE AND OBJECTIVE BOX
CECILLE RODRIGUES  89y  Female      Patient is a 89y old  Female who presents with a chief complaint of Anemia , Afib, leeann, acidosis (20 Apr 2021 15:04)        REVIEW OF SYSTEMS:  CONSTITUTIONAL: No fever, weight loss, or fatigue  EYES: No eye pain, visual disturbances, or discharge  ENMT:  No difficulty hearing, tinnitus, vertigo; No sinus or throat pain  NECK: No pain or stiffness  BREASTS: No pain, masses, or nipple discharge  RESPIRATORY: No cough, wheezing, chills or hemoptysis; No shortness of breath  CARDIOVASCULAR: No chest pain, palpitations, dizziness, or leg swelling  GASTROINTESTINAL: No abdominal or epigastric pain. No nausea, vomiting, or hematemesis; No diarrhea or constipation. No melena or hematochezia.  GENITOURINARY: No dysuria, frequency, hematuria, or incontinence  NEUROLOGICAL: No headaches, memory loss, loss of strength, numbness, or tremors  SKIN: No itching, burning, rashes, or lesions   MUSCULOSKELETAL: No joint pain or swelling; No muscle, back, or extremity pain  PSYCHIATRIC: No depression, anxiety, mood swings, or difficulty sleeping  HEME/LYMPH: No easy bruising, or bleeding gums  ALLERY AND IMMUNOLOGIC: No hives or eczema  FAMILY HISTORY:  No pertinent family history in first degree relatives      T(C): 36.2 (04-21-21 @ 07:01), Max: 36.7 (04-20-21 @ 15:00)  HR: 92 (04-21-21 @ 07:10) (76 - 92)  BP: 170/85 (04-21-21 @ 07:10) (143/71 - 170/85)  RR: 18 (04-21-21 @ 07:10) (18 - 20)  SpO2: 98% (04-21-21 @ 07:10) (97% - 99%)  Wt(kg): --Vital Signs Last 24 Hrs  T(C): 36.2 (21 Apr 2021 07:01), Max: 36.7 (20 Apr 2021 15:00)  T(F): 97.2 (21 Apr 2021 07:01), Max: 98.1 (20 Apr 2021 15:00)  HR: 92 (21 Apr 2021 07:10) (76 - 92)  BP: 170/85 (21 Apr 2021 07:10) (143/71 - 170/85)  BP(mean): 119 (21 Apr 2021 07:10) (101 - 119)  RR: 18 (21 Apr 2021 07:10) (18 - 20)  SpO2: 98% (21 Apr 2021 07:10) (97% - 99%)  No Known Allergies      PHYSICAL EXAM:  GENERAL: NAD,   HEAD:  Atraumatic, Normocephalic  EYES: EOMI, PERRLA, conjunctiva and sclera clear  ENMT: No tonsillar erythema, exudates, or enlargement;   NECK: Supple, No JVD, Normal thyroid  NERVOUS SYSTEM:  Alert & Orientated  CHEST/LUNG: vbs  HEART: Regular rate and rhythm; No murmurs, rubs, or gallops  ABDOMEN: Soft, Nontender, Nondistended; Bowel sounds present  EXTREMITIES:  No clubbing, cyanosis, or edema  LYMPH: No lymphadenopathy noted  SKIN: No rashes or lesions      LABS:  04-21    143  |  112<H>  |  25<H>  ----------------------------<  94  4.0   |  23  |  1.4    Ca    8.2<L>      21 Apr 2021 05:48  Mg     1.8     04-21    TPro  4.8<L>  /  Alb  2.6<L>  /  TBili  0.6  /  DBili  x   /  AST  104<H>  /  ALT  57<H>  /  AlkPhos  73  04-21                          9.4    10.84 )-----------( 233      ( 21 Apr 2021 05:48 )             30.6         RADIOLOGY & ADDITIONAL TESTS:    MEDICATION:  acetaminophen   Tablet .. 650 milliGRAM(s) Oral every 6 hours PRN  acetaminophen   Tablet .. 650 milliGRAM(s) Oral every 6 hours PRN  ALPRAZolam 0.25 milliGRAM(s) Oral daily PRN  cefTRIAXone   IVPB 2000 milliGRAM(s) IV Intermittent every 24 hours  chlorhexidine 4% Liquid 1 Application(s) Topical <User Schedule>  metoprolol tartrate 25 milliGRAM(s) Oral two times a day  metroNIDAZOLE  IVPB 500 milliGRAM(s) IV Intermittent every 8 hours  pantoprazole  Injectable 40 milliGRAM(s) IV Push every 12 hours      HEALTH ISSUES - PROBLEM Dx:    s/p perforated bowel tolerating the diet,rocephin,metronidazole  s/p gi bleeding ,anemia due to acute blood loss s/p 2 units prbc,pantoparazole  hx A fib xarelto on hold ,  generalized weakness pt/ot  leeann better s/p intravenous fluids

## 2021-04-21 NOTE — PROGRESS NOTE ADULT - SUBJECTIVE AND OBJECTIVE BOX
89yFemale  Being followed for abnormal CT scan findings   Interval history: Patient denies nausea, vomiting, hematemesis, melena, blood in stool, diarrhea, constipation, abdominal pain. Patient still on full liquid diet. patient wants to go home.      PAST MEDICAL & SURGICAL HISTORY:   Atrial fibrillation    GERD (gastroesophageal reflux disease)    HTN (hypertension)    No significant past surgical history            Social History: No smoking. No alcohol. No illegal drug use.            MEDICATIONS  (STANDING):  cefTRIAXone   IVPB 2000 milliGRAM(s) IV Intermittent every 24 hours  chlorhexidine 4% Liquid 1 Application(s) Topical <User Schedule>  metoprolol tartrate 25 milliGRAM(s) Oral two times a day  metroNIDAZOLE  IVPB 500 milliGRAM(s) IV Intermittent every 8 hours  pantoprazole  Injectable 40 milliGRAM(s) IV Push every 12 hours  rivaroxaban 15 milliGRAM(s) Oral daily    MEDICATIONS  (PRN):  acetaminophen   Tablet .. 650 milliGRAM(s) Oral every 6 hours PRN Temp greater or equal to 38C (100.4F)  acetaminophen   Tablet .. 650 milliGRAM(s) Oral every 6 hours PRN Mild Pain (1 - 3)  ALPRAZolam 0.25 milliGRAM(s) Oral daily PRN anxiety      Allergies:   No Known Allergies          REVIEW OF SYSTEMS:  General:  No weight loss, fevers, or chills.  Eyes:  No reported pain or visual changes  ENT:  No sore throat or runny nose.  NECK: No stiffness   CV:  No chest pain or palpitations.  Resp:  No shortness of breath, cough  GI:  No abdominal pain, nausea, vomiting, dysphagia, diarrhea or constipation. No rectal bleeding, melena, or hematemesis.  Neuro:  No tingling, numbness           VITAL SIGNS:   T(F): 97.2 (04-21-21 @ 07:01), Max: 98.1 (04-20-21 @ 15:00)  HR: 92 (04-21-21 @ 07:10) (76 - 92)  BP: 170/85 (04-21-21 @ 07:10) (143/71 - 170/85)  RR: 18 (04-21-21 @ 07:10) (18 - 20)  SpO2: 98% (04-21-21 @ 07:10) (97% - 99%)    PHYSICAL EXAM:  GENERAL: AAOx3, no acute distress.  HEAD:  Atraumatic, Normocephalic  EYES: conjunctiva and sclera clear  NECK: Supple, no JVD or thyromegaly  CHEST/LUNG: Clear to auscultation bilaterally; No wheeze, rhonchi, or rales  HEART: Regular rate and rhythm; normal S1, S2, No murmurs.  ABDOMEN: Soft, nontender, nondistended; Bowel sounds present  NEUROLOGY: No asterixis or tremor.   SKIN: Intact, no jaundice            LABS:                        9.4    10.84 )-----------( 233      ( 21 Apr 2021 05:48 )             30.6     04-21    143  |  112<H>  |  25<H>  ----------------------------<  94  4.0   |  23  |  1.4    Ca    8.2<L>      21 Apr 2021 05:48  Mg     1.8     04-21    TPro  4.8<L>  /  Alb  2.6<L>  /  TBili  0.6  /  DBili  x   /  AST  104<H>  /  ALT  57<H>  /  AlkPhos  73  04-21    LIVER FUNCTIONS - ( 21 Apr 2021 05:48 )  Alb: 2.6 g/dL / Pro: 4.8 g/dL / ALK PHOS: 73 U/L / ALT: 57 U/L / AST: 104 U/L / GGT: x               IMAGING:    < from: CT Abdomen and Pelvis No Cont (04.15.21 @ 14:43) >    EXAM:  CT CHEST        EXAM:  CT ABDOMEN AND PELVIS            PROCEDURE DATE:  04/15/2021            INTERPRETATION:  CLINICAL STATEMENT: Status post fall    TECHNIQUE: Contiguous axial CT images were obtained from the chest to the pubic symphysiswithout intravenous contrast.  Oral contrast was not given.  Reformatted images in the coronal and sagittal planes were acquired. Additional MIP images were obtained.    COMPARISON CT: None.    Study is severely limited in evaluation due to motion artifact.      FINDINGS:    CHEST: There is a trace nonspecific pericardial effusion. The thyroid gland is present with the right thyroid extending into the superior mediastinum. The thyroid gland is incompletely evaluated on CT. There is no definite evidence of thoracic adenopathy. The heart size is enlarged. There is atherosclerosis. The thoracic aorta is normal in caliber. The central tracheobronchial tree is patent. There is no consolidation, pneumothorax or pleural effusion. There are patchy areas of subsegmental atelectasis.    HEPATOBILIARY: The gallbladder is distended.    SPLEEN: Unremarkable..    PANCREAS: Fatty infiltration of the pancreas.    ADRENAL GLANDS: The left adrenal gland is thickened. The right adrenal gland is unremarkable.    KIDNEYS: The left kidney is atrophic. There may be a right parapelvic cyst, limited in evaluation due to motion. There is no hydronephrosis. There are subcentimeter renal hypodensities, too small to characterize.    ABDOMINOPELVIC NODES: Unremarkable..    PELVIC ORGANS: The uterus and adnexa are incompletely evaluated on CT.    PERITONEUM/MESENTERY/BOWEL: There are punctate foci of free intraperitoneal air in the upper abdomen, for example in the right upper quadrant on image 185 of series 3 and in the left upper quadrant image 177 of series 3. There is no evidence of obstruction.    BONES/SOFT TISSUES: There is cortical irregularity to the coccyx on image 380 of series 3 consistent with fracture of indeterminate age. Age indeterminate compression deformity of L1. Clinical correlation is recommended. There are degenerative changes. There is a scoliosis. Evaluation of the ribs for traumatic injury is limited due to motion artifact; nondisplaced fractures are difficult to exclude. Thereis a large fat-containing right hip lesion measuring 10 cm, indeterminate.    OTHER: There are abdominal pelvic vascular calcifications. The infrarenal abdominal aorta measures up to 2.4 cm..      IMPRESSION:    Free intraperitoneal air in the upper abdomen suggestive of bowel perforation of unclear origin.    Age indeterminate compression deformity of L1 as well as age-indeterminate cortical irregularity to the coccyx. Findings are consistent with fracture of indeterminate age. Clinical correlation for point tenderness is recommended.    Distended gallbladder    Indeterminate fat-containing lesion within the right hip measuring 10 cm          Spoke with PILLO MOROCHO PA on 4/15/2021 3:41 PM with readback.              DERECK GIORDANO MD; Attending Radiologist  This document has been electronically signed. Apr 15 2021  3:49PM    < end of copied text >

## 2021-04-21 NOTE — SWALLOW BEDSIDE ASSESSMENT ADULT - SLP GENERAL OBSERVATIONS
Pt awake and alert, mild confusion noted. OOB to chair with no c/o pain
Patient received awake/alert, sitting upright in bed. Patient is AOx1, appears with confusion and decreased insight. Patient appears comfortable on room air.

## 2021-04-21 NOTE — PROGRESS NOTE ADULT - ASSESSMENT
IMPRESSION:  Hypoglycemia - RESOLVED   GI bleed s/p 2 unit of PRBCs total  Possible microperforation  Sepsis  Rhabdomyolysis  Acute kidney injury - Improving   H/o of AFib on xarelto      PLAN:    CNS: avoid depressants    HEENT:  Oral care    PULMONARY:  HOB @ 45 degrees    CARDIOVASCULAR:  Strict I&Os.    GI:  Continue IV protonix 40 mg IV BID.  Tolerating PO diet. Seen by speech and swallow and cleared for dysphagia 3 thin liquid diet.                               RENAL:  F/u  lytes.  Correct as needed.  Nephrology following.     INFECTIOUS DISEASE: Continue rocephin/flagyl. F/u with ID for duration of antibiotics.     HEMATOLOGICAL:  Start xarelto tonight.     ENDOCRINE:  Follow up FS.      MUSCULOSKELETAL: increase activity as tolerated    CODE STATUS: FULL CODE.   Acute, from home.   Maintain on telemetry. Anticipated for discharge on 4/22.   IMPRESSION:  Hypoglycemia - RESOLVED   GI bleed s/p 2 unit of PRBCs total  Possible microperforation  Sepsis  Rhabdomyolysis  Acute kidney injury - Improving   H/o of Chronic AFib on xarelto      PLAN:    CNS: avoid depressants    HEENT:  Oral care    PULMONARY:  HOB @ 45 degrees    CARDIOVASCULAR:  Strict I&Os.    GI:  Continue IV protonix 40 mg IV BID.  Tolerating PO diet. Seen by speech and swallow and cleared for dysphagia 3 thin liquid diet.                               RENAL:  F/u  lytes.  Correct as needed.  Nephrology following.     INFECTIOUS DISEASE: Continue rocephin/flagyl. F/u with ID for duration of antibiotics.     HEMATOLOGICAL:  Start xarelto tonight.     ENDOCRINE:  Follow up FS.      MUSCULOSKELETAL: increase activity as tolerated    CODE STATUS: FULL CODE.   Acute, from home.   Maintain on telemetry. Anticipated for discharge on 4/22.

## 2021-04-21 NOTE — SWALLOW BEDSIDE ASSESSMENT ADULT - COMMENTS
No s/s aspiration/penetration
Per discussion with MD (Naheedqy) patient has been cleared by GI to participate in dysphagia evaluation and consume PO trials of all consistencies/textures.

## 2021-04-21 NOTE — PROGRESS NOTE ADULT - ASSESSMENT
IMPRESSION:  hypoglycemia  GI bleed s/p 1 unit of PRBCs  Possible microperforation  sepsis  hypotension  Rhabdomyolysis  Acute kidney injury  H/o of AFib on xarelto.      PLAN:    CNS: avoid depressants    HEENT:  Oral care    PULMONARY:  HOB @ 45 degrees    CARDIOVASCULAR: keep is = os oral feed    metoprolol home dose     GI: GI prophylaxis  ,   speech and swallow feeding   follow Gi and GSx     RENAL:  F/u  lytes.   Correct as needed.   accurate I/O    INFECTIOUS DISEASE:  continue rocephin/flagyl. follow ID for duration   F/u cultures.    HEMATOLOGICAL:  DVT prophylaxis. off xarelto for Gi bleed   follow with Gi if can resume AC or at least for dvt prophylaxis I    ENDOCRINE:  Follow up FS.  Insulin protocol if needed.    MUSCULOSKELETAL: bed rest     CODE STATUS: FULL CODE  tele as per cardiology

## 2021-04-21 NOTE — SWALLOW BEDSIDE ASSESSMENT ADULT - NS SPL SWALLOW CLINIC TRIAL FT
No s/s aspiration/penetration for small individual cup sips, suspected pharyngeal dysphagia for sequential sips

## 2021-04-21 NOTE — SWALLOW BEDSIDE ASSESSMENT ADULT - SWALLOW EVAL: RECOMMENDED FEEDING/EATING TECHNIQUES
maintain upright posture during/after eating for 30 mins/no straws/oral hygiene/position upright (90 degrees)/small sips/bites
small sips/bites

## 2021-04-21 NOTE — SWALLOW BEDSIDE ASSESSMENT ADULT - SLP PERTINENT HISTORY OF CURRENT PROBLEM
yes  89 year old woman brought in by family for evaluation . patient was found on floor , confused as per grandson today. As per patient, she has been weak since receiving second moderna vaccine last week. Patient with decreased po intake and decreased urine output. patient denies any back pain, no SOB, no chest pain. Patient with hx of a.fib on xarelto, GERD on PPI. patient states she has been having black stools over past few weeks. Pt with Sepsis, GI bleed now resolved, possible microperforation? per chart. Cleared for PO per GI and MD Tracey

## 2021-04-21 NOTE — PROGRESS NOTE ADULT - SUBJECTIVE AND OBJECTIVE BOX
Patient is a 89y old  Female who presents with a chief complaint of Anemia , Afib, leeann, acidosis (2021 08:23)      Over Night Events:  Patient seen and examined.   stable not on distress HR better controlled     ROS:  See HPI    PHYSICAL EXAM    ICU Vital Signs Last 24 Hrs  T(C): 36.2 (2021 07:01), Max: 36.7 (2021 15:00)  T(F): 97.2 (2021 07:01), Max: 98.1 (2021 15:00)  HR: 92 (2021 07:10) (76 - 92)  BP: 170/85 (2021 07:10) (143/71 - 170/85)  BP(mean): 119 (2021 07:10) (101 - 119)  ABP: --  ABP(mean): --  RR: 18 (2021 07:10) (18 - 20)  SpO2: 98% (2021 07:10) (97% - 99%)      General: Awake   HEENT:                Lymph Nodes: NO cervical LN   Lungs: Bilateral BS  Cardiovascular: Regular   Abdomen: Soft, Positive BS  Extremities: No clubbing   Skin: warm   Neurological: no focal   Musculoskeletal: move all ext     I&O's Detail    2021 07:01  -  2021 07:00  --------------------------------------------------------  IN:    IV PiggyBack: 50 mL    IV PiggyBack: 200 mL    Oral Fluid: 630 mL  Total IN: 880 mL    OUT:    Indwelling Catheter - Urethral (mL): 150 mL    Voided (mL): 900 mL  Total OUT: 1050 mL    Total NET: -170 mL          LABS:                          9.4    10.84 )-----------( 233      ( 2021 05:48 )             30.6         2021 05:48    143    |  112    |  25     ----------------------------<  94     4.0     |  23     |  1.4      Ca    8.2        2021 05:48  Mg     1.8       2021 05:48    TPro  4.8    /  Alb  2.6    /  TBili  0.6    /  DBili  x      /  AST  104    /  ALT  57     /  AlkPhos  73     2021 05:48  Amylase x     lipase x                                                                                        Urinalysis Basic - ( 2021 08:00 )    Color: Yellow / Appearance: Clear / S.025 / pH: x  Gluc: x / Ketone: Negative  / Bili: Negative / Urobili: 0.2 mg/dL   Blood: x / Protein: 100 mg/dL / Nitrite: Negative   Leuk Esterase: Negative / RBC: 1-2 /HPF / WBC 1-2 /HPF   Sq Epi: x / Non Sq Epi: x / Bacteria: Few          CARDIAC MARKERS ( 2021 05:48 )  x     / x     / 1746 U/L / x     / x                                                                                                                                                 MEDICATIONS  (STANDING):  cefTRIAXone   IVPB 2000 milliGRAM(s) IV Intermittent every 24 hours  chlorhexidine 4% Liquid 1 Application(s) Topical <User Schedule>  metoprolol tartrate 25 milliGRAM(s) Oral two times a day  metroNIDAZOLE  IVPB 500 milliGRAM(s) IV Intermittent every 8 hours  pantoprazole  Injectable 40 milliGRAM(s) IV Push every 12 hours    MEDICATIONS  (PRN):  acetaminophen   Tablet .. 650 milliGRAM(s) Oral every 6 hours PRN Temp greater or equal to 38C (100.4F)  acetaminophen   Tablet .. 650 milliGRAM(s) Oral every 6 hours PRN Mild Pain (1 - 3)  ALPRAZolam 0.25 milliGRAM(s) Oral daily PRN anxiety          Xrays:  TLC:  OG:  ET tube:                                                                                       ECHO:  CAM ICU:

## 2021-04-21 NOTE — SWALLOW BEDSIDE ASSESSMENT ADULT - SWALLOW EVAL: DIAGNOSIS
No s/s aspiration/penetration for thin liquids via small individual cup sips, puree, mechanical soft. Suspected pharyngeal dysphagia for thin liquids via consecutive sips
Mild oral impairment with suspected s/s of pharyngeal impairment given overt s/s of aspiration/penetration observed at bedside

## 2021-04-21 NOTE — PROGRESS NOTE ADULT - SUBJECTIVE AND OBJECTIVE BOX
Patient is a 89y old  Female who presents with a chief complaint of Anemia , Afib, leeann, acidosis (21 Apr 2021 08:01)      T(F): 97.2 (04-21-21 @ 07:01), Max: 98.1 (04-20-21 @ 15:00)  HR: 92 (04-21-21 @ 07:10)  BP: 170/85 (04-21-21 @ 07:10)  RR: 18 (04-21-21 @ 07:10)  SpO2: 98% (04-21-21 @ 07:10) (97% - 99%)    PHYSICAL EXAM:  GENERAL: NAD, well-groomed, well-developed  HEAD:  Atraumatic, Normocephalic  EYES: EOMI, PERRLA, conjunctiva and sclera clear  ENMT: No tonsillar erythema, exudates, or enlargement; Moist mucous membranes, Good dentition, No lesions  NECK: Supple, No JVD, Normal thyroid  NERVOUS SYSTEM:  Alert & Oriented X3,  Motor Strength 5/5 B/L upper and lower extremities  CHEST/LUNG: Clear to percussion bilaterally; No rales, rhonchi, wheezing, or rubs  HEART: Regular rate and rhythm; No murmurs, rubs, or gallops  ABDOMEN: Soft, Nontender, Nondistended; Bowel sounds present  EXTREMITIES:   No clubbing, cyanosis, or edema  LYMPH: No lymphadenopathy noted  SKIN: No rashes or lesions    labs  04-21    143  |  112<H>  |  25<H>  ----------------------------<  94  4.0   |  23  |  1.4    Ca    8.2<L>      21 Apr 2021 05:48  Mg     1.8     04-21    TPro  4.8<L>  /  Alb  2.6<L>  /  TBili  0.6  /  DBili  x   /  AST  104<H>  /  ALT  57<H>  /  AlkPhos  73  04-21                          9.4    10.84 )-----------( 233      ( 21 Apr 2021 05:48 )             30.6           Creatine Kinase, Serum: 1746 U/L *H* (04-21-21 @ 05:48)      acetaminophen   Tablet .. 650 milliGRAM(s) Oral every 6 hours PRN  acetaminophen   Tablet .. 650 milliGRAM(s) Oral every 6 hours PRN  ALPRAZolam 0.25 milliGRAM(s) Oral daily PRN  cefTRIAXone   IVPB 2000 milliGRAM(s) IV Intermittent every 24 hours  chlorhexidine 4% Liquid 1 Application(s) Topical <User Schedule>  metoprolol tartrate 25 milliGRAM(s) Oral two times a day  metroNIDAZOLE  IVPB 500 milliGRAM(s) IV Intermittent every 8 hours  pantoprazole  Injectable 40 milliGRAM(s) IV Push every 12 hours

## 2021-04-21 NOTE — SWALLOW BEDSIDE ASSESSMENT ADULT - SWALLOW EVAL: RECOMMENDED DIET
Dysphagia 3 with thin liquids via small, individual cup sips
Dysphagia Diet III Mechanical soft consistency with cut up meats with nectar thickened liquids via small cup sips

## 2021-04-21 NOTE — PROGRESS NOTE ADULT - SUBJECTIVE AND OBJECTIVE BOX
SUBJECTIVE:    Patient is a 88 y/o Female who presents to [facility] after being found unresponsive at home.     Currently admitted to medicine with the primary diagnosis of GI bleed.     Today is hospital day 6. Patient was seen and examined at bedside. This morning she is resting comfortably in bed.  No overnight events. Mentating well and asking to be discharged home. Will resume Xarelto today and monitor for bleeding overnight.     PAST MEDICAL & SURGICAL HISTORY  PAST MEDICAL & SURGICAL HISTORY:  Atrial fibrillation    GERD (gastroesophageal reflux disease)    HTN (hypertension)    No significant past surgical history    SOCIAL HISTORY:  Lives alone  NO smoking   No etoh    ALLERGIES:  No Known Allergies    MEDICATIONS:  MEDICATIONS  (STANDING):  cefTRIAXone   IVPB 2000 milliGRAM(s) IV Intermittent every 24 hours  chlorhexidine 4% Liquid 1 Application(s) Topical <User Schedule>  metoprolol tartrate 25 milliGRAM(s) Oral two times a day  metroNIDAZOLE  IVPB 500 milliGRAM(s) IV Intermittent every 8 hours  pantoprazole  Injectable 40 milliGRAM(s) IV Push every 12 hours  rivaroxaban 15 milliGRAM(s) Oral daily    MEDICATIONS  (PRN):  acetaminophen   Tablet .. 650 milliGRAM(s) Oral every 6 hours PRN Temp greater or equal to 38C (100.4F)  acetaminophen   Tablet .. 650 milliGRAM(s) Oral every 6 hours PRN Mild Pain (1 - 3)  ALPRAZolam 0.25 milliGRAM(s) Oral daily PRN anxiety    VITALS:   Vital Signs (24 Hrs):  T(C): 36.2 (21 @ 07:01), Max: 36.7 (21 @ 15:00)  HR: 92 (21 @ 07:10) (76 - 92)  BP: 170/85 (21 @ 07:10) (143/71 - 170/85)  RR: 18 (21 @ 07:10) (18 - 20)  SpO2: 98% (21 @ 07:10) (97% - 99%)  Wt(kg): --  Daily     Daily Weight in k (2021 07:01)    I&O's Summary    2021 07:01  -  2021 07:00  --------------------------------------------------------  IN: 880 mL / OUT: 1050 mL / NET: -170 mL        LABS:                                          9.4    10.84 )-----------( 233      ( 2021 05:48 )             30.6           143  |  112<H>  |  25<H>  ----------------------------<  94  4.0   |  23  |  1.4    Ca    8.2<L>      2021 05:48  Mg     1.8         TPro  4.8<L>  /  Alb  2.6<L>  /  TBili  0.6  /  DBili  x   /  AST  104<H>  /  ALT  57<H>  /  AlkPhos  73            RADIOLOGY:  < from: US Abdomen Limited (21 @ 12:15) >    IMPRESSION:    Sludge within the gallbladder. Nonobstructing right renal calculus. Without significant change.      < end of copied text >  < from: CT Head No Cont (21 @ 11:55) >    IMPRESSION:  No evidence of acute transcortical infarct, acute intracranial hemorrhage, or mass effect.          < end of copied text >  < from: CT Chest No Cont (04.15.21 @ 14:43) >    IMPRESSION:    Free intraperitoneal air in the upper abdomen suggestive of bowel perforation of unclear origin.    Age indeterminate compression deformity of L1 as well as age-indeterminate cortical irregularity to the coccyx. Findings are consistent with fracture of indeterminate age. Clinical correlation for point tenderness is recommended.    Distended gallbladder    Indeterminate fat-containing lesion within the right hip measuring 10 cm        < end of copied text >        PHYSICAL EXAM:  GENERAL: NAD, speaks in full sentences, no signs of respiratory distress  HEAD: Atraumatic  NECK: Supple  CHEST/LUNG: Clear to auscultation bilaterally; No wheeze or crackles  HEART: S1, S2; RRR; No murmurs, rubs, or gallops  ABDOMEN: BS+; Soft, Non-tender, Non-distended  EXTREMITIES:  2+ Peripheral Pulses, No clubbing, cyanosis, or edema  NEUROLOGY: non-focal  SKIN: No rashes or lesions

## 2021-04-21 NOTE — PHARMACOTHERAPY INTERVENTION NOTE - INTERVENTION TYPE RECOOMEND
Dose Optimization/Non-renal Dose Adjustment
Therapy Recommended - Contraindication
Dose Adjustment - Renal Dose

## 2021-04-21 NOTE — SWALLOW BEDSIDE ASSESSMENT ADULT - H & P REVIEW
yes
89 year old woman brought in by family for evaluation . patient was found on floor , confused as per grandson today. As per patient, she has been weak since receiving second moderna vaccine last week. Patient with decreased po intake and decreased urine output. patient denies any back pain, no SOB, no chest pain. Patient with hx of a.fib on xarelto, GERD on PPI. patient states she has been having black stools over past few weeks. Pt denies any falls, no LOC./yes

## 2021-04-22 ENCOUNTER — TRANSCRIPTION ENCOUNTER (OUTPATIENT)
Age: 86
End: 2021-04-22

## 2021-04-22 VITALS — TEMPERATURE: 98 F | RESPIRATION RATE: 16 BRPM

## 2021-04-22 LAB
ALBUMIN SERPL ELPH-MCNC: 2.7 G/DL — LOW (ref 3.5–5.2)
ALP SERPL-CCNC: 72 U/L — SIGNIFICANT CHANGE UP (ref 30–115)
ALT FLD-CCNC: 49 U/L — HIGH (ref 0–41)
ANION GAP SERPL CALC-SCNC: 11 MMOL/L — SIGNIFICANT CHANGE UP (ref 7–14)
AST SERPL-CCNC: 71 U/L — HIGH (ref 0–41)
BASOPHILS # BLD AUTO: 0.01 K/UL — SIGNIFICANT CHANGE UP (ref 0–0.2)
BASOPHILS NFR BLD AUTO: 0.1 % — SIGNIFICANT CHANGE UP (ref 0–1)
BILIRUB SERPL-MCNC: 0.6 MG/DL — SIGNIFICANT CHANGE UP (ref 0.2–1.2)
BUN SERPL-MCNC: 23 MG/DL — HIGH (ref 10–20)
CALCIUM SERPL-MCNC: 7.9 MG/DL — LOW (ref 8.5–10.1)
CHLORIDE SERPL-SCNC: 110 MMOL/L — SIGNIFICANT CHANGE UP (ref 98–110)
CO2 SERPL-SCNC: 22 MMOL/L — SIGNIFICANT CHANGE UP (ref 17–32)
CREAT SERPL-MCNC: 1.4 MG/DL — SIGNIFICANT CHANGE UP (ref 0.7–1.5)
EOSINOPHIL # BLD AUTO: 0.24 K/UL — SIGNIFICANT CHANGE UP (ref 0–0.7)
EOSINOPHIL NFR BLD AUTO: 1.9 % — SIGNIFICANT CHANGE UP (ref 0–8)
GLUCOSE SERPL-MCNC: 107 MG/DL — HIGH (ref 70–99)
HCT VFR BLD CALC: 31 % — LOW (ref 37–47)
HGB BLD-MCNC: 9.6 G/DL — LOW (ref 12–16)
IMM GRANULOCYTES NFR BLD AUTO: 0.6 % — HIGH (ref 0.1–0.3)
LYMPHOCYTES # BLD AUTO: 1.29 K/UL — SIGNIFICANT CHANGE UP (ref 1.2–3.4)
LYMPHOCYTES # BLD AUTO: 10 % — LOW (ref 20.5–51.1)
MAGNESIUM SERPL-MCNC: 1.6 MG/DL — LOW (ref 1.8–2.4)
MCHC RBC-ENTMCNC: 29.4 PG — SIGNIFICANT CHANGE UP (ref 27–31)
MCHC RBC-ENTMCNC: 31 G/DL — LOW (ref 32–37)
MCV RBC AUTO: 95.1 FL — SIGNIFICANT CHANGE UP (ref 81–99)
MONOCYTES # BLD AUTO: 1.28 K/UL — HIGH (ref 0.1–0.6)
MONOCYTES NFR BLD AUTO: 10 % — HIGH (ref 1.7–9.3)
NEUTROPHILS # BLD AUTO: 9.96 K/UL — HIGH (ref 1.4–6.5)
NEUTROPHILS NFR BLD AUTO: 77.4 % — HIGH (ref 42.2–75.2)
NRBC # BLD: 0 /100 WBCS — SIGNIFICANT CHANGE UP (ref 0–0)
PLATELET # BLD AUTO: 291 K/UL — SIGNIFICANT CHANGE UP (ref 130–400)
POTASSIUM SERPL-MCNC: 3.5 MMOL/L — SIGNIFICANT CHANGE UP (ref 3.5–5)
POTASSIUM SERPL-SCNC: 3.5 MMOL/L — SIGNIFICANT CHANGE UP (ref 3.5–5)
PROT SERPL-MCNC: 4.8 G/DL — LOW (ref 6–8)
RBC # BLD: 3.26 M/UL — LOW (ref 4.2–5.4)
RBC # FLD: 19.2 % — HIGH (ref 11.5–14.5)
SODIUM SERPL-SCNC: 143 MMOL/L — SIGNIFICANT CHANGE UP (ref 135–146)
WBC # BLD: 12.86 K/UL — HIGH (ref 4.8–10.8)
WBC # FLD AUTO: 12.86 K/UL — HIGH (ref 4.8–10.8)

## 2021-04-22 PROCEDURE — 99233 SBSQ HOSP IP/OBS HIGH 50: CPT

## 2021-04-22 RX ORDER — PANTOPRAZOLE SODIUM 20 MG/1
1 TABLET, DELAYED RELEASE ORAL
Qty: 30 | Refills: 0
Start: 2021-04-22 | End: 2021-05-21

## 2021-04-22 RX ORDER — LOSARTAN POTASSIUM 100 MG/1
0 TABLET, FILM COATED ORAL
Qty: 0 | Refills: 2 | DISCHARGE

## 2021-04-22 RX ORDER — LOSARTAN POTASSIUM 100 MG/1
1 TABLET, FILM COATED ORAL
Qty: 90 | Refills: 2
Start: 2021-04-22 | End: 2022-01-16

## 2021-04-22 RX ORDER — MAGNESIUM SULFATE 500 MG/ML
2 VIAL (ML) INJECTION ONCE
Refills: 0 | Status: COMPLETED | OUTPATIENT
Start: 2021-04-22 | End: 2021-04-22

## 2021-04-22 RX ADMIN — PANTOPRAZOLE SODIUM 40 MILLIGRAM(S): 20 TABLET, DELAYED RELEASE ORAL at 05:29

## 2021-04-22 RX ADMIN — Medication 25 MILLIGRAM(S): at 05:29

## 2021-04-22 RX ADMIN — CHLORHEXIDINE GLUCONATE 1 APPLICATION(S): 213 SOLUTION TOPICAL at 11:16

## 2021-04-22 RX ADMIN — CEFTRIAXONE 100 MILLIGRAM(S): 500 INJECTION, POWDER, FOR SOLUTION INTRAMUSCULAR; INTRAVENOUS at 11:19

## 2021-04-22 RX ADMIN — RIVAROXABAN 15 MILLIGRAM(S): KIT at 11:16

## 2021-04-22 RX ADMIN — Medication 25 GRAM(S): at 08:40

## 2021-04-22 RX ADMIN — Medication 100 MILLIGRAM(S): at 05:29

## 2021-04-22 NOTE — PROGRESS NOTE ADULT - PROVIDER SPECIALTY LIST ADULT
Infectious Disease
Internal Medicine
Surgery
Surgery
Cardiology
Cardiology
Gastroenterology
Internal Medicine
Pulmonology
Cardiology
Cardiology
Critical Care
Critical Care
Gastroenterology
Internal Medicine
Nephrology
Surgery
Surgery
Critical Care
Gastroenterology
Internal Medicine
Pulmonology
Pulmonology
Cardiology
Cardiology
Gastroenterology
Nephrology

## 2021-04-22 NOTE — CHART NOTE - NSCHARTNOTEFT_GEN_A_CORE
<<<RESIDENT DISCHARGE NOTE>>>     CECILLE RODRIGUES  MRN-[id number]    VITAL SIGNS:  T(F): 97.5 (04-22-21 @ 07:05), Max: 98.6 (04-21-21 @ 23:23)  HR: 89 (04-22-21 @ 05:30)  BP: 152/69 (04-22-21 @ 05:30)  SpO2: 96% (04-22-21 @ 05:30)      PHYSICAL EXAMINATION:  GENERAL: NAD, speaks in full sentences, no signs of respiratory distress  HEAD: Atraumatic  NECK: Supple  CHEST/LUNG: Clear to auscultation bilaterally; No wheeze or crackles  HEART: S1, S2; RRR; No murmurs, rubs, or gallops  ABDOMEN: BS+; Soft, Non-tender, Non-distended  EXTREMITIES:  2+ Peripheral Pulses, No clubbing, cyanosis, or edema  NEUROLOGY: non-focal  SKIN: No rashes or lesions    TEST RESULTS:                        9.6    12.86 )-----------( 291      ( 22 Apr 2021 05:45 )             31.0       04-22    143  |  110  |  23<H>  ----------------------------<  107<H>  3.5   |  22  |  1.4    Ca    7.9<L>      22 Apr 2021 05:45  Mg     1.6     04-22    TPro  4.8<L>  /  Alb  2.7<L>  /  TBili  0.6  /  DBili  x   /  AST  71<H>  /  ALT  49<H>  /  AlkPhos  72  04-22      FINAL DISCHARGE INTERVIEW:  Resident(s) Present: (Name: Dr Soto)    DISCHARGE MEDICATION RECONCILIATION  reviewed with Attending (Name: Dr Vicente)    DISPOSITION:   [ X ] Home,    [  ] Home with Visiting Nursing Services,   [    ]  SNF/ NH,    [   ] Acute Rehab (4A),   [   ] Other (Specify:_________)

## 2021-04-22 NOTE — DISCHARGE NOTE NURSING/CASE MANAGEMENT/SOCIAL WORK - PATIENT PORTAL LINK FT
You can access the FollowMyHealth Patient Portal offered by [facility] by registering at the following website: [email or web address]. By joining [facility]’s FollowMyHealth portal, you will also be able to view your health information using other applications (apps) compatible with our system.

## 2021-04-22 NOTE — DISCHARGE NOTE PROVIDER - HOSPITAL COURSE
89 year old woman brought in by family for evaluation . patient was found on floor , confused as per grandson today. As per patient, she has been weak since receiving second moderna vaccine last week. Patient with decreased po intake and decreased urine output. patient denies any back pain, no SOB, no chest pain. Patient with hx of a.fib on xarelto, GERD on PPI. patient states she has been having black stools over past few weeks. Pt denies any falls, no LOC.     Patient was found to be hypoglycemia to <10, was in rhabdomyalysis, TYRESE with possible bowel perforation. also had GI bleed and received 2 units PRBC. Patient was seen and evaluated by GI and surgery. Treated with antibiotics. TYRESE improved. Patient mentating well. Seen ambulating comfortably with PT in hallways. Restarted xarelto with no further bleeding. Stable for discharge with close outpatient follow up.

## 2021-04-22 NOTE — PROGRESS NOTE ADULT - ASSESSMENT
89yFemale pmh A-Fib on Eliquis last dose yesterday, GERD, HTN presents for fall. Patient reports intermittent dark brown stools and GI was called for her anemia.    Problem 1-Anemia with intermittent dark stools as per patient  ddx PUD, malignancy, retroperitoneal hemorrhage, diverticulosis  -rectal exam without evidence for active GI bleeding   Free intraperitoneal air in the upper abdomen suggestive of bowel perforation of unclear origin.  Rec  -patient awaiting discharge   -okay to resume diet as per speech and swallow  -avoid N-SAIDs  -EGD in one-two months after healing of perforation   -PPI BID  -abx  -Monitor Vital Signs  - Follow up with our GI office located on [address] [place] [address], Phone number [phone] with Dr. Jama    Problem 2-Altered liver chemistries   -distended gallbladder  ddx cholestasis of sepsis, rhabdo , infectious etiology  Rec  -LFTs improving  -RUQ ultrasound appreciated WNL no cbd dilation   -can check CLD outpatient   - Follow up with our GI office located on [address] [place] [address], Phone number [phone] with Dr. Jama  89yFemale pmh A-Fib on Eliquis last dose yesterday, GERD, HTN presents for fall. Patient reports intermittent dark brown stools and GI was called for her anemia.    Problem 1-Anemia with intermittent dark stools as per patient  ddx PUD, malignancy, retroperitoneal hemorrhage, diverticulosis  -rectal exam without evidence for active GI bleeding   Free intraperitoneal air in the upper abdomen suggestive of bowel perforation of unclear origin.  Rec  -patient awaiting discharge   -okay to resume diet as per speech and swallow  -avoid N-SAIDs  -EGD in one-two months after healing of perforation   -PPI BID  -abx  -Monitor Vital Signs  - Follow up with our GI office located on [address] [place] [address], Phone number [phone] with Dr. Jama    Problem 2-Altered liver chemistries   -distended gallbladder  ddx cholestasis of sepsis, rhabdo , infectious etiology  Rec  -LFTs improving  -RUQ ultrasound appreciated WNL no cbd dilation   -can check CLD outpatient   - Follow up with our GI office located on [address] [place] [address], Phone number [phone] with Dr. Jama    Recall GI if needed

## 2021-04-22 NOTE — PROGRESS NOTE ADULT - ASSESSMENT
IMPRESSION:  Hypoglycemia - RESOLVED   GI bleed s/p 2 unit of PRBCs total  Possible microperforation- Probable PUD  Sepsis  Rhabdomyolysis  Acute kidney injury - Improving   H/o of AFib on xarelto- now restarted  Deconditioning from all      PLAN:    CNS: avoid depressants    HEENT:  Oral care    PULMONARY:  HOB @ 45 degrees    CARDIOVASCULAR:  Strict I&Os.    GI:  Continue IV protonix 40 mg IV BID.  Tolerating PO diet. Seen by speech and swallow and cleared for dysphagia 3 thin liquid diet.      Tolerating food;                               RENAL:  F/u  lytes.  Correct as needed.  Nephrology following.     INFECTIOUS DISEASE: Continue rocephin/flagyl. F/u with ID for duration of antibiotics.       Finish with 1 week of Augmentin as per ID    HEMATOLOGICAL:  Started xarelto  last night.     ENDOCRINE:  Follow up FS.      MUSCULOSKELETAL: increase activity as tolerated    CODE STATUS: FULL CODE.   Acute, from home.   Maintain on telemetry. OK for discharge on 4/22.  Rev'd w Dr Tesfaye- who spoke to her family, & knows pt very well, & HO

## 2021-04-22 NOTE — DISCHARGE NOTE PROVIDER - CARE PROVIDER_API CALL
[place] [address]  Phone: [phone]  Fax: [phone]  Follow Up Time:     Trav Tesfaye  CARDIOVASCULAR DISEASE  [address] [place] [address]  Phone: [phone]  Fax: [phone]  Follow Up Time:

## 2021-04-22 NOTE — PROGRESS NOTE ADULT - NSICDXPILOT_GEN_ALL_CORE
[place]

## 2021-04-22 NOTE — PROGRESS NOTE ADULT - ASSESSMENT
No complaints. She wants to go home. Tolerating AC. Ambulate. She has appointment outpatient to see me in aweek. Patient aware no advil

## 2021-04-22 NOTE — PROGRESS NOTE ADULT - ATTENDING COMMENTS
Patient seen and examined with surgery PA on rounds in ICU and discussed management plans. Patient denies any abd pain today. Abd soft benign nontender. On liquid diet. Hgb stable now. diet can be advanced  will follow prn
Pt seen and examined.  Agree with above.    Abd soft, nontender, nondistended, NGT had clear output (minimal)    D/C NGT  Ice chips okay  Continue Abx.  if WBC remains elevated, may need to repeat CT with po contrast since source was not definitively seen  Serial abdominal exams  Will follow closely with you  No surgical intervention necessary at this time
Attending Statement: I have personally performed a face to face diagnostic evaluation on this patient. The patient is suffering from hypoglycemia, renal failure, sepsis.  I have reviewed the above note and agree with the history, exam and suggestions for care, except as I have noted in the text. I have amended the treatment plans as necessary.
Bowel perforation of unclear origin.  Rec  -monitor patient, diet resumption as per surgery  -EGD in one-two months after healing of perforation   -NPO  -Given elevated troponin and CK patient will need cardiology evaluation and risk stratification prior to any outpatient intervention  -sepsis work-up  -Maintain Hemodynamic Stability   -Monitor CBC  -CMP,Optimize Electrolytes  -Transfuse prn to hgb >8  -Two large bore IV lines
I edited the note

## 2021-04-22 NOTE — PROGRESS NOTE ADULT - SUBJECTIVE AND OBJECTIVE BOX
Patient is a 89y old  Female who presents with a chief complaint of Anemia , Afib, leeann, acidosis (21 Apr 2021 08:01)      T(F): 97.2 (04-21-21 @ 07:01), Max: 98.1 (04-20-21 @ 15:00)  HR: 92 (04-21-21 @ 07:10)  BP: 170/85 (04-21-21 @ 07:10)  RR: 18 (04-21-21 @ 07:10)  SpO2: 98% (04-21-21 @ 07:10) (97% - 99%)    PHYSICAL EXAM:  GENERAL: NAD, well-groomed, well-developed  HEAD:  Atraumatic, Normocephalic  EYES: EOMI, PERRLA, conjunctiva and sclera clear  ENMT: No tonsillar erythema, exudates, or enlargement; Moist mucous membranes, Good dentition, No lesions  NECK: Supple, No JVD, Normal thyroid  NERVOUS SYSTEM:  Alert & Oriented X3,  Motor Strength 5/5 B/L upper and lower extremities  CHEST/LUNG: Clear to percussion bilaterally; No rales, rhonchi, wheezing, or rubs  HEART: Irreg No murmurs, rubs, or gallops  ABDOMEN: Soft, Nontender, Nondistended; Bowel sounds present  EXTREMITIES:   No clubbing, cyanosis, or edema  LYMPH: No lymphadenopathy noted  SKIN: No rashes or lesions    labs  04-21    143  |  112<H>  |  25<H>  ----------------------------<  94  4.0   |  23  |  1.4    Ca    8.2<L>      21 Apr 2021 05:48  Mg     1.8     04-21    TPro  4.8<L>  /  Alb  2.6<L>  /  TBili  0.6  /  DBili  x   /  AST  104<H>  /  ALT  57<H>  /  AlkPhos  73  04-21                          9.4    10.84 )-----------( 233      ( 21 Apr 2021 05:48 )             30.6           Creatine Kinase, Serum: 1746 U/L *H* (04-21-21 @ 05:48)      acetaminophen   Tablet .. 650 milliGRAM(s) Oral every 6 hours PRN  acetaminophen   Tablet .. 650 milliGRAM(s) Oral every 6 hours PRN  ALPRAZolam 0.25 milliGRAM(s) Oral daily PRN  cefTRIAXone   IVPB 2000 milliGRAM(s) IV Intermittent every 24 hours  chlorhexidine 4% Liquid 1 Application(s) Topical <User Schedule>  metoprolol tartrate 25 milliGRAM(s) Oral two times a day  metroNIDAZOLE  IVPB 500 milliGRAM(s) IV Intermittent every 8 hours  pantoprazole  Injectable 40 milliGRAM(s) IV Push every 12 hours

## 2021-04-22 NOTE — DISCHARGE NOTE PROVIDER - NSDCMRMEDTOKEN_GEN_ALL_CORE_FT
ALPRAZOLAM 0.25 MG TABLET: 1 tab(s) orally once a day, As Needed  EZETIMIBE 10 MG TABLET: TAKE 1 TABLET BY MOUTH EVERY DAY  METOPROLOL TARTRATE 50 MG TAB: 1.5 tab(s) orally once a day  ROSUVASTATIN CALCIUM 20 MG TAB: TAKE 1 TABLET BY MOUTH EVERY DAY  XARELTO 15 MG TABLET: TAKE 1 TABLET BY MOUTH EVERY DAY   ALPRAZOLAM 0.25 MG TABLET: 1 tab(s) orally once a day, As Needed  Augmentin 500 mg-125 mg oral tablet: 1 tab(s) orally 2 times a day   EZETIMIBE 10 MG TABLET: TAKE 1 TABLET BY MOUTH EVERY DAY  LOSARTAN POTASSIUM 100 MG TAB: 1 each orally once a day   METOPROLOL TARTRATE 50 MG TAB: 1.5 tab(s) orally once a day  Protonix 40 mg oral delayed release tablet: 1 tab(s) orally once a day   ROSUVASTATIN CALCIUM 20 MG TAB: TAKE 1 TABLET BY MOUTH EVERY DAY  XARELTO 15 MG TABLET: TAKE 1 TABLET BY MOUTH EVERY DAY

## 2021-04-22 NOTE — PROGRESS NOTE ADULT - SUBJECTIVE AND OBJECTIVE BOX
89yFemale  Being followed for bowel perforation   Interval history: Patient denies nausea, vomiting, hematemesis, melena, blood in stool, diarrhea, constipation, abdominal pain. Patient tolerating dysphagia 3 soft thin liquids.       PAST MEDICAL & SURGICAL HISTORY:   Atrial fibrillation    GERD (gastroesophageal reflux disease)    HTN (hypertension)    No significant past surgical history            Social History: No smoking. No alcohol. No illegal drug use.            MEDICATIONS  (STANDING):  cefTRIAXone   IVPB 2000 milliGRAM(s) IV Intermittent every 24 hours  chlorhexidine 4% Liquid 1 Application(s) Topical <User Schedule>  metoprolol tartrate 25 milliGRAM(s) Oral two times a day  metroNIDAZOLE  IVPB 500 milliGRAM(s) IV Intermittent every 8 hours  pantoprazole  Injectable 40 milliGRAM(s) IV Push every 12 hours  rivaroxaban 15 milliGRAM(s) Oral daily    MEDICATIONS  (PRN):  acetaminophen   Tablet .. 650 milliGRAM(s) Oral every 6 hours PRN Temp greater or equal to 38C (100.4F)  acetaminophen   Tablet .. 650 milliGRAM(s) Oral every 6 hours PRN Mild Pain (1 - 3)  ALPRAZolam 0.25 milliGRAM(s) Oral daily PRN anxiety      Allergies:   No Known Allergies          REVIEW OF SYSTEMS:  General:  No weight loss, fevers, or chills.  Eyes:  No reported pain or visual changes  ENT:  No sore throat or runny nose.  NECK: No stiffness   CV:  No chest pain or palpitations.  Resp:  No shortness of breath, cough  GI:  No abdominal pain, nausea, vomiting, dysphagia, diarrhea or constipation. No rectal bleeding, melena, or hematemesis.  Neuro:  No tingling, numbness       VITAL SIGNS:   T(F): 97.5 (04-22-21 @ 07:05), Max: 98.6 (04-21-21 @ 23:23)  HR: 89 (04-22-21 @ 05:30) (75 - 89)  BP: 152/69 (04-22-21 @ 05:30) (140/77 - 152/69)  RR: 16 (04-22-21 @ 07:05) (16 - 18)  SpO2: 96% (04-22-21 @ 05:30) (96% - 99%)    PHYSICAL EXAM:  GENERAL: AAOx3, no acute distress.  HEAD:  Atraumatic, Normocephalic  EYES: conjunctiva and sclera clear  NECK: Supple, no JVD or thyromegaly  CHEST/LUNG: Clear to auscultation bilaterally; No wheeze, rhonchi, or rales  HEART: Regular rate and rhythm; normal S1, S2, No murmurs.  ABDOMEN: Soft, nontender, nondistended; Bowel sounds present  NEUROLOGY: No asterixis or tremor.   SKIN: Intact, no jaundice            LABS:                        9.6    12.86 )-----------( 291      ( 22 Apr 2021 05:45 )             31.0     04-22    143  |  110  |  23<H>  ----------------------------<  107<H>  3.5   |  22  |  1.4    Ca    7.9<L>      22 Apr 2021 05:45  Mg     1.6     04-22    TPro  4.8<L>  /  Alb  2.7<L>  /  TBili  0.6  /  DBili  x   /  AST  71<H>  /  ALT  49<H>  /  AlkPhos  72  04-22    LIVER FUNCTIONS - ( 22 Apr 2021 05:45 )  Alb: 2.7 g/dL / Pro: 4.8 g/dL / ALK PHOS: 72 U/L / ALT: 49 U/L / AST: 71 U/L / GGT: x               IMAGING:    < from: CT Abdomen and Pelvis No Cont (04.15.21 @ 14:43) >    EXAM:  CT CHEST        EXAM:  CT ABDOMEN AND PELVIS            PROCEDURE DATE:  04/15/2021            INTERPRETATION:  CLINICAL STATEMENT: Status post fall    TECHNIQUE: Contiguous axial CT images were obtained from the chest to the pubic symphysiswithout intravenous contrast.  Oral contrast was not given.  Reformatted images in the coronal and sagittal planes were acquired. Additional MIP images were obtained.    COMPARISON CT: None.    Study is severely limited in evaluation due to motion artifact.      FINDINGS:    CHEST: There is a trace nonspecific pericardial effusion. The thyroid gland is present with the right thyroid extending into the superior mediastinum. The thyroid gland is incompletely evaluated on CT. There is no definite evidence of thoracic adenopathy. The heart size is enlarged. There is atherosclerosis. The thoracic aorta is normal in caliber. The central tracheobronchial tree is patent. There is no consolidation, pneumothorax or pleural effusion. There are patchy areas of subsegmental atelectasis.    HEPATOBILIARY: The gallbladder is distended.    SPLEEN: Unremarkable..    PANCREAS: Fatty infiltration of the pancreas.    ADRENAL GLANDS: The left adrenal gland is thickened. The right adrenal gland is unremarkable.    KIDNEYS: The left kidney is atrophic. There may be a right parapelvic cyst, limited in evaluation due to motion. There is no hydronephrosis. There are subcentimeter renal hypodensities, too small to characterize.    ABDOMINOPELVIC NODES: Unremarkable..    PELVIC ORGANS: The uterus and adnexa are incompletely evaluated on CT.    PERITONEUM/MESENTERY/BOWEL: There are punctate foci of free intraperitoneal air in the upper abdomen, for example in the right upper quadrant on image 185 of series 3 and in the left upper quadrant image 177 of series 3. There is no evidence of obstruction.    BONES/SOFT TISSUES: There is cortical irregularity to the coccyx on image 380 of series 3 consistent with fracture of indeterminate age. Age indeterminate compression deformity of L1. Clinical correlation is recommended. There are degenerative changes. There is a scoliosis. Evaluation of the ribs for traumatic injury is limited due to motion artifact; nondisplaced fractures are difficult to exclude. Thereis a large fat-containing right hip lesion measuring 10 cm, indeterminate.    OTHER: There are abdominal pelvic vascular calcifications. The infrarenal abdominal aorta measures up to 2.4 cm..      IMPRESSION:    Free intraperitoneal air in the upper abdomen suggestive of bowel perforation of unclear origin.    Age indeterminate compression deformity of L1 as well as age-indeterminate cortical irregularity to the coccyx. Findings are consistent with fracture of indeterminate age. Clinical correlation for point tenderness is recommended.    Distended gallbladder    Indeterminate fat-containing lesion within the right hip measuring 10 cm          Spoke with PILLO MOROCHO PA on 4/15/2021 3:41 PM with readback.              DERECK GIORDANO MD; Attending Radiologist  This document has been electronically signed. Apr 15 2021  3:49PM    < end of copied text >

## 2021-04-22 NOTE — PROGRESS NOTE ADULT - SUBJECTIVE AND OBJECTIVE BOX
Chart reviewed, patient examined. Pertinent results reviewed.  Case discussed with HO; specialist f/u reviewed  HD#7    SUBJECTIVE:    Patient is a 88 y/o Female who presents to [facility] after being found unresponsive at home.     Currently admitted to medicine with the primary diagnosis of GI bleed.   Patient was seen and examined at bedside. This morning she is resting comfortably in bed.  No overnight events. Mentating well and asking to be discharged home. Team did  resume Xarelto today and she had no signs of bleeding overnight.     PAST MEDICAL & SURGICAL HISTORY  PAST MEDICAL & SURGICAL HISTORY:  Atrial fibrillation    GERD (gastroesophageal reflux disease)    HTN (hypertension)    No significant past surgical history    SOCIAL HISTORY:  Lives alone  NO smoking   No etoh    ALLERGIES:  No Known Allergies    MEDICATIONS:  MEDICATIONS  (STANDING):  cefTRIAXone   IVPB 2000 milliGRAM(s) IV Intermittent every 24 hours  chlorhexidine 4% Liquid 1 Application(s) Topical <User Schedule>  metoprolol tartrate 25 milliGRAM(s) Oral two times a day  metroNIDAZOLE  IVPB 500 milliGRAM(s) IV Intermittent every 8 hours  pantoprazole  Injectable 40 milliGRAM(s) IV Push every 12 hours  rivaroxaban 15 milliGRAM(s) Oral daily    MEDICATIONS  (PRN):  acetaminophen   Tablet .. 650 milliGRAM(s) Oral every 6 hours PRN Temp greater or equal to 38C (100.4F)  acetaminophen   Tablet .. 650 milliGRAM(s) Oral every 6 hours PRN Mild Pain (1 - 3)  ALPRAZolam 0.25 milliGRAM(s) Oral daily PRN anxiety      VITALS:   Vital Signs Last 24 Hrs  T(C): 36.4 (2021 07:05), Max: 37 (2021 23:23)  T(F): 97.5 (2021 07:05), Max: 98.6 (2021 23:23)  HR: 89 (2021 05:30) (75 - 89)  BP: 152/69 (2021 05:30) (140/77 - 152/69)  BP(mean): 99 (2021 05:30) (99 - 101)  RR: 16 (2021 07:05) (16 - 18)  SpO2: 96% (2021 05:30) (96% - 99%)      Daily Weight in k (2021 07:01)    I&O's Summary    2021 07:01  -  2021 07:00  --------------------------------------------------------  IN: 880 mL / OUT: 1050 mL / NET: -170 mL        LABS:                                       9.6    12.86 )-----------( 291      ( 2021 05:45 )             31.0                            9.4    10.84 )-----------( 233      ( 2021 05:48 )             30.6   04-    143  |  110  |  23<H>  ----------------------------<  107<H>  3.5   |  22  |  1.4    Ca    7.9<L>      2021 05:45  Mg     1.6         TPro  4.8<L>  /  Alb  2.7<L>  /  TBili  0.6  /  DBili  x   /  AST  71<H>  /  ALT  49<H>  /  AlkPhos  72      143  |  112<H>  |  25<H>  ----------------------------<  94  4.0   |  23  |  1.4    Ca    8.2<L>      2021 05:48  Mg     1.8         TPro  4.8<L>  /  Alb  2.6<L>  /  TBili  0.6  /  DBili  x   /  AST  104<H>  /  ALT  57<H>  /  AlkPhos  73            RADIOLOGY:  < from: US Abdomen Limited (21 @ 12:15) >    IMPRESSION:    Sludge within the gallbladder. Nonobstructing right renal calculus. Without significant change.      < end of copied text >  < from: CT Head No Cont (21 @ 11:55) >    IMPRESSION:  No evidence of acute transcortical infarct, acute intracranial hemorrhage, or mass effect.          < end of copied text >  < from: CT Chest No Cont (04.15.21 @ 14:43) >    IMPRESSION:    Free intraperitoneal air in the upper abdomen suggestive of bowel perforation of unclear origin.    Age indeterminate compression deformity of L1 as well as age-indeterminate cortical irregularity to the coccyx. Findings are consistent with fracture of indeterminate age. Clinical correlation for point tenderness is recommended.    Distended gallbladder    Indeterminate fat-containing lesion within the right hip measuring 10 cm        < end of copied text >        PHYSICAL EXAM:  GENERAL: NAD, speaks in full sentences, no signs of respiratory distress  HEAD: Atraumatic  NECK: Supple  CHEST/LUNG: Clear  bilaterally; No wheeze or crackles  HEART: IIRR; No murmurs, rubs, or gallops  ABDOMEN: BS+; Soft, Non-tender, Non-distended  EXTREMITIES:  2+ Peripheral Pulses, No clubbing, cyanosis, or edema  NEUROLOGY: non-focal; Ox4  SKIN: No rashes or lesions

## 2021-04-22 NOTE — DISCHARGE NOTE PROVIDER - NSDCCPCAREPLAN_GEN_ALL_CORE_FT
PRINCIPAL DISCHARGE DIAGNOSIS  Diagnosis: GI bleed  Assessment and Plan of Treatment: - follow up with gastroenterology within 1 month for colonoscopy  - continue to monitor stool for bleeding.   - return to ER if you feel lightheaded or signs of bleeding  - follow up with Primary care doctor within 2 weeks of discharge.      SECONDARY DISCHARGE DIAGNOSES  Diagnosis: TYRESE (acute kidney injury)  Assessment and Plan of Treatment: - follow up with primary care doctor within 2 weeks of discharge    Diagnosis: Chronic atrial fibrillation  Assessment and Plan of Treatment: - continue xarelto     PRINCIPAL DISCHARGE DIAGNOSIS  Diagnosis: GI bleed  Assessment and Plan of Treatment: - follow up with gastroenterology within 1 month for colonoscopy  - continue to monitor stool for bleeding. .  - return to ER if you feel lightheaded or signs of bleeding  - follow up with Primary care doctor within 2 weeks of discharge.      SECONDARY DISCHARGE DIAGNOSES  Diagnosis: TYRESE (acute kidney injury)  Assessment and Plan of Treatment: - follow up with primary care doctor within 2 weeks of discharge    Diagnosis: Chronic atrial fibrillation  Assessment and Plan of Treatment: - continue xarelto

## 2021-05-05 DIAGNOSIS — E87.6 HYPOKALEMIA: ICD-10-CM

## 2021-05-05 DIAGNOSIS — M62.82 RHABDOMYOLYSIS: ICD-10-CM

## 2021-05-05 DIAGNOSIS — K21.9 GASTRO-ESOPHAGEAL REFLUX DISEASE WITHOUT ESOPHAGITIS: ICD-10-CM

## 2021-05-05 DIAGNOSIS — I10 ESSENTIAL (PRIMARY) HYPERTENSION: ICD-10-CM

## 2021-05-05 DIAGNOSIS — E16.2 HYPOGLYCEMIA, UNSPECIFIED: ICD-10-CM

## 2021-05-05 DIAGNOSIS — R53.1 WEAKNESS: ICD-10-CM

## 2021-05-05 DIAGNOSIS — E87.2 ACIDOSIS: ICD-10-CM

## 2021-05-05 DIAGNOSIS — I48.20 CHRONIC ATRIAL FIBRILLATION, UNSPECIFIED: ICD-10-CM

## 2021-05-05 DIAGNOSIS — D62 ACUTE POSTHEMORRHAGIC ANEMIA: ICD-10-CM

## 2021-05-05 DIAGNOSIS — G93.41 METABOLIC ENCEPHALOPATHY: ICD-10-CM

## 2021-05-05 DIAGNOSIS — N17.9 ACUTE KIDNEY FAILURE, UNSPECIFIED: ICD-10-CM

## 2021-05-05 DIAGNOSIS — Z79.01 LONG TERM (CURRENT) USE OF ANTICOAGULANTS: ICD-10-CM

## 2021-05-05 DIAGNOSIS — E87.0 HYPEROSMOLALITY AND HYPERNATREMIA: ICD-10-CM

## 2021-05-05 DIAGNOSIS — R74.01 ELEVATION OF LEVELS OF LIVER TRANSAMINASE LEVELS: ICD-10-CM

## 2021-05-05 DIAGNOSIS — I95.9 HYPOTENSION, UNSPECIFIED: ICD-10-CM

## 2021-05-05 DIAGNOSIS — N76.89 OTHER SPECIFIED INFLAMMATION OF VAGINA AND VULVA: ICD-10-CM

## 2021-05-05 DIAGNOSIS — R13.10 DYSPHAGIA, UNSPECIFIED: ICD-10-CM

## 2021-05-05 DIAGNOSIS — N26.1 ATROPHY OF KIDNEY (TERMINAL): ICD-10-CM

## 2021-05-05 DIAGNOSIS — A41.9 SEPSIS, UNSPECIFIED ORGANISM: ICD-10-CM

## 2021-05-05 DIAGNOSIS — K27.6 CHRONIC OR UNSPECIFIED PEPTIC ULCER, SITE UNSPECIFIED, WITH BOTH HEMORRHAGE AND PERFORATION: ICD-10-CM

## 2021-06-04 PROBLEM — I48.91 UNSPECIFIED ATRIAL FIBRILLATION: Chronic | Status: ACTIVE | Noted: 2021-04-15

## 2021-06-04 PROBLEM — I10 ESSENTIAL (PRIMARY) HYPERTENSION: Chronic | Status: ACTIVE | Noted: 2021-04-15

## 2021-06-04 PROBLEM — Z00.00 ENCOUNTER FOR PREVENTIVE HEALTH EXAMINATION: Status: ACTIVE | Noted: 2021-06-04

## 2021-06-04 PROBLEM — K21.9 GASTRO-ESOPHAGEAL REFLUX DISEASE WITHOUT ESOPHAGITIS: Chronic | Status: ACTIVE | Noted: 2021-04-15

## 2021-06-08 ENCOUNTER — INPATIENT (INPATIENT)
Facility: HOSPITAL | Age: 86
LOS: 8 days | Discharge: NURSING, REHAB OR LONG-TERM CARE | End: 2021-06-17
Attending: INTERNAL MEDICINE | Admitting: INTERNAL MEDICINE
Payer: MEDICARE

## 2021-06-08 VITALS
TEMPERATURE: 103 F | DIASTOLIC BLOOD PRESSURE: 65 MMHG | HEIGHT: 65 IN | RESPIRATION RATE: 20 BRPM | SYSTOLIC BLOOD PRESSURE: 118 MMHG | OXYGEN SATURATION: 95 % | HEART RATE: 116 BPM

## 2021-06-08 LAB
ALBUMIN SERPL ELPH-MCNC: 3.4 G/DL — LOW (ref 3.5–5.2)
ALP SERPL-CCNC: 79 U/L — SIGNIFICANT CHANGE UP (ref 30–115)
ALT FLD-CCNC: 7 U/L — SIGNIFICANT CHANGE UP (ref 0–41)
ANION GAP SERPL CALC-SCNC: 10 MMOL/L — SIGNIFICANT CHANGE UP (ref 7–14)
ANION GAP SERPL CALC-SCNC: 15 MMOL/L — HIGH (ref 7–14)
APAP SERPL-MCNC: 11 UG/ML — SIGNIFICANT CHANGE UP (ref 10–30)
APPEARANCE UR: CLEAR — SIGNIFICANT CHANGE UP
APTT BLD: 22.6 SEC — CRITICAL LOW (ref 27–39.2)
AST SERPL-CCNC: 19 U/L — SIGNIFICANT CHANGE UP (ref 0–41)
BACTERIA # UR AUTO: ABNORMAL
BASOPHILS # BLD AUTO: 0.01 K/UL — SIGNIFICANT CHANGE UP (ref 0–0.2)
BASOPHILS # BLD AUTO: 0.01 K/UL — SIGNIFICANT CHANGE UP (ref 0–0.2)
BASOPHILS NFR BLD AUTO: 0.1 % — SIGNIFICANT CHANGE UP (ref 0–1)
BASOPHILS NFR BLD AUTO: 0.1 % — SIGNIFICANT CHANGE UP (ref 0–1)
BILIRUB SERPL-MCNC: 1.2 MG/DL — SIGNIFICANT CHANGE UP (ref 0.2–1.2)
BILIRUB UR-MCNC: ABNORMAL
BLD GP AB SCN SERPL QL: SIGNIFICANT CHANGE UP
BUN SERPL-MCNC: 34 MG/DL — HIGH (ref 10–20)
BUN SERPL-MCNC: 39 MG/DL — HIGH (ref 10–20)
CALCIUM SERPL-MCNC: 7.6 MG/DL — LOW (ref 8.5–10.1)
CALCIUM SERPL-MCNC: 9.1 MG/DL — SIGNIFICANT CHANGE UP (ref 8.5–10.1)
CHLORIDE SERPL-SCNC: 112 MMOL/L — HIGH (ref 98–110)
CHLORIDE SERPL-SCNC: 119 MMOL/L — HIGH (ref 98–110)
CK SERPL-CCNC: 61 U/L — SIGNIFICANT CHANGE UP (ref 0–225)
CO2 SERPL-SCNC: 17 MMOL/L — SIGNIFICANT CHANGE UP (ref 17–32)
CO2 SERPL-SCNC: 20 MMOL/L — SIGNIFICANT CHANGE UP (ref 17–32)
COLOR SPEC: YELLOW — SIGNIFICANT CHANGE UP
CREAT SERPL-MCNC: 1.4 MG/DL — SIGNIFICANT CHANGE UP (ref 0.7–1.5)
CREAT SERPL-MCNC: 1.7 MG/DL — HIGH (ref 0.7–1.5)
DIFF PNL FLD: ABNORMAL
EOSINOPHIL # BLD AUTO: 0 K/UL — SIGNIFICANT CHANGE UP (ref 0–0.7)
EOSINOPHIL # BLD AUTO: 0 K/UL — SIGNIFICANT CHANGE UP (ref 0–0.7)
EOSINOPHIL NFR BLD AUTO: 0 % — SIGNIFICANT CHANGE UP (ref 0–8)
EOSINOPHIL NFR BLD AUTO: 0 % — SIGNIFICANT CHANGE UP (ref 0–8)
EPI CELLS # UR: ABNORMAL /HPF
GLUCOSE SERPL-MCNC: 110 MG/DL — HIGH (ref 70–99)
GLUCOSE SERPL-MCNC: 118 MG/DL — HIGH (ref 70–99)
GLUCOSE UR QL: NEGATIVE MG/DL — SIGNIFICANT CHANGE UP
HCT VFR BLD CALC: 22.2 % — LOW (ref 37–47)
HCT VFR BLD CALC: 32.2 % — LOW (ref 37–47)
HGB BLD-MCNC: 6.6 G/DL — CRITICAL LOW (ref 12–16)
HGB BLD-MCNC: 9.9 G/DL — LOW (ref 12–16)
IMM GRANULOCYTES NFR BLD AUTO: 0.4 % — HIGH (ref 0.1–0.3)
IMM GRANULOCYTES NFR BLD AUTO: 0.5 % — HIGH (ref 0.1–0.3)
INR BLD: 1.57 RATIO — HIGH (ref 0.65–1.3)
KETONES UR-MCNC: ABNORMAL
LEUKOCYTE ESTERASE UR-ACNC: NEGATIVE — SIGNIFICANT CHANGE UP
LYMPHOCYTES # BLD AUTO: 0.69 K/UL — LOW (ref 1.2–3.4)
LYMPHOCYTES # BLD AUTO: 0.96 K/UL — LOW (ref 1.2–3.4)
LYMPHOCYTES # BLD AUTO: 10.2 % — LOW (ref 20.5–51.1)
LYMPHOCYTES # BLD AUTO: 8.1 % — LOW (ref 20.5–51.1)
MCHC RBC-ENTMCNC: 29.7 G/DL — LOW (ref 32–37)
MCHC RBC-ENTMCNC: 30 PG — SIGNIFICANT CHANGE UP (ref 27–31)
MCHC RBC-ENTMCNC: 30.6 PG — SIGNIFICANT CHANGE UP (ref 27–31)
MCHC RBC-ENTMCNC: 30.7 G/DL — LOW (ref 32–37)
MCV RBC AUTO: 100.9 FL — HIGH (ref 81–99)
MCV RBC AUTO: 99.4 FL — HIGH (ref 81–99)
MONOCYTES # BLD AUTO: 0.77 K/UL — HIGH (ref 0.1–0.6)
MONOCYTES # BLD AUTO: 1.17 K/UL — HIGH (ref 0.1–0.6)
MONOCYTES NFR BLD AUTO: 12.5 % — HIGH (ref 1.7–9.3)
MONOCYTES NFR BLD AUTO: 9.1 % — SIGNIFICANT CHANGE UP (ref 1.7–9.3)
NEUTROPHILS # BLD AUTO: 7 K/UL — HIGH (ref 1.4–6.5)
NEUTROPHILS # BLD AUTO: 7.19 K/UL — HIGH (ref 1.4–6.5)
NEUTROPHILS NFR BLD AUTO: 76.7 % — HIGH (ref 42.2–75.2)
NEUTROPHILS NFR BLD AUTO: 82.3 % — HIGH (ref 42.2–75.2)
NITRITE UR-MCNC: NEGATIVE — SIGNIFICANT CHANGE UP
NRBC # BLD: 0 /100 WBCS — SIGNIFICANT CHANGE UP (ref 0–0)
NRBC # BLD: 0 /100 WBCS — SIGNIFICANT CHANGE UP (ref 0–0)
PH UR: 6 — SIGNIFICANT CHANGE UP (ref 5–8)
PLATELET # BLD AUTO: 234 K/UL — SIGNIFICANT CHANGE UP (ref 130–400)
PLATELET # BLD AUTO: 331 K/UL — SIGNIFICANT CHANGE UP (ref 130–400)
POTASSIUM SERPL-MCNC: 3.7 MMOL/L — SIGNIFICANT CHANGE UP (ref 3.5–5)
POTASSIUM SERPL-MCNC: 3.8 MMOL/L — SIGNIFICANT CHANGE UP (ref 3.5–5)
POTASSIUM SERPL-SCNC: 3.7 MMOL/L — SIGNIFICANT CHANGE UP (ref 3.5–5)
POTASSIUM SERPL-SCNC: 3.8 MMOL/L — SIGNIFICANT CHANGE UP (ref 3.5–5)
PROT SERPL-MCNC: 5.7 G/DL — LOW (ref 6–8)
PROT UR-MCNC: >=300 MG/DL
PROTHROM AB SERPL-ACNC: 18.1 SEC — HIGH (ref 9.95–12.87)
RAPID RVP RESULT: SIGNIFICANT CHANGE UP
RBC # BLD: 2.2 M/UL — LOW (ref 4.2–5.4)
RBC # BLD: 3.24 M/UL — LOW (ref 4.2–5.4)
RBC # FLD: 19.7 % — HIGH (ref 11.5–14.5)
RBC # FLD: 21.2 % — HIGH (ref 11.5–14.5)
RBC CASTS # UR COMP ASSIST: ABNORMAL /HPF
SARS-COV-2 RNA SPEC QL NAA+PROBE: SIGNIFICANT CHANGE UP
SODIUM SERPL-SCNC: 146 MMOL/L — SIGNIFICANT CHANGE UP (ref 135–146)
SODIUM SERPL-SCNC: 147 MMOL/L — HIGH (ref 135–146)
SP GR SPEC: >=1.03 (ref 1.01–1.03)
UROBILINOGEN FLD QL: 0.2 MG/DL — SIGNIFICANT CHANGE UP (ref 0.2–0.2)
WBC # BLD: 8.5 K/UL — SIGNIFICANT CHANGE UP (ref 4.8–10.8)
WBC # BLD: 9.38 K/UL — SIGNIFICANT CHANGE UP (ref 4.8–10.8)
WBC # FLD AUTO: 8.5 K/UL — SIGNIFICANT CHANGE UP (ref 4.8–10.8)
WBC # FLD AUTO: 9.38 K/UL — SIGNIFICANT CHANGE UP (ref 4.8–10.8)
WBC UR QL: SIGNIFICANT CHANGE UP /HPF

## 2021-06-08 PROCEDURE — 71045 X-RAY EXAM CHEST 1 VIEW: CPT | Mod: 26

## 2021-06-08 PROCEDURE — 99285 EMERGENCY DEPT VISIT HI MDM: CPT

## 2021-06-08 PROCEDURE — 73706 CT ANGIO LWR EXTR W/O&W/DYE: CPT | Mod: 26,RT,MA

## 2021-06-08 PROCEDURE — 71260 CT THORAX DX C+: CPT | Mod: 26,MA

## 2021-06-08 PROCEDURE — 93010 ELECTROCARDIOGRAM REPORT: CPT | Mod: NC

## 2021-06-08 PROCEDURE — 74177 CT ABD & PELVIS W/CONTRAST: CPT | Mod: 26,MA

## 2021-06-08 PROCEDURE — 70450 CT HEAD/BRAIN W/O DYE: CPT | Mod: 26,MA

## 2021-06-08 PROCEDURE — 72170 X-RAY EXAM OF PELVIS: CPT | Mod: 26

## 2021-06-08 PROCEDURE — 72125 CT NECK SPINE W/O DYE: CPT | Mod: 26,MA

## 2021-06-08 RX ORDER — ACETAMINOPHEN 500 MG
975 TABLET ORAL ONCE
Refills: 0 | Status: COMPLETED | OUTPATIENT
Start: 2021-06-08 | End: 2021-06-08

## 2021-06-08 RX ORDER — ALPRAZOLAM 0.25 MG
1 TABLET ORAL
Qty: 0 | Refills: 0 | DISCHARGE

## 2021-06-08 RX ORDER — LOSARTAN POTASSIUM 100 MG/1
100 TABLET, FILM COATED ORAL DAILY
Refills: 0 | Status: DISCONTINUED | OUTPATIENT
Start: 2021-06-08 | End: 2021-06-17

## 2021-06-08 RX ORDER — METOPROLOL TARTRATE 50 MG
50 TABLET ORAL
Refills: 0 | Status: DISCONTINUED | OUTPATIENT
Start: 2021-06-08 | End: 2021-06-17

## 2021-06-08 RX ORDER — SODIUM CHLORIDE 9 MG/ML
1000 INJECTION INTRAMUSCULAR; INTRAVENOUS; SUBCUTANEOUS
Refills: 0 | Status: DISCONTINUED | OUTPATIENT
Start: 2021-06-08 | End: 2021-06-08

## 2021-06-08 RX ORDER — EZETIMIBE 10 MG/1
0 TABLET ORAL
Qty: 0 | Refills: 1 | DISCHARGE

## 2021-06-08 RX ORDER — CEFEPIME 1 G/1
1000 INJECTION, POWDER, FOR SOLUTION INTRAMUSCULAR; INTRAVENOUS EVERY 12 HOURS
Refills: 0 | Status: DISCONTINUED | OUTPATIENT
Start: 2021-06-08 | End: 2021-06-11

## 2021-06-08 RX ORDER — PANTOPRAZOLE SODIUM 20 MG/1
40 TABLET, DELAYED RELEASE ORAL EVERY 12 HOURS
Refills: 0 | Status: DISCONTINUED | OUTPATIENT
Start: 2021-06-08 | End: 2021-06-12

## 2021-06-08 RX ORDER — METOPROLOL TARTRATE 50 MG
1.5 TABLET ORAL
Qty: 0 | Refills: 1 | DISCHARGE

## 2021-06-08 RX ORDER — SODIUM CHLORIDE 9 MG/ML
1000 INJECTION, SOLUTION INTRAVENOUS
Refills: 0 | Status: DISCONTINUED | OUTPATIENT
Start: 2021-06-08 | End: 2021-06-09

## 2021-06-08 RX ORDER — ATORVASTATIN CALCIUM 80 MG/1
80 TABLET, FILM COATED ORAL AT BEDTIME
Refills: 0 | Status: DISCONTINUED | OUTPATIENT
Start: 2021-06-08 | End: 2021-06-16

## 2021-06-08 RX ORDER — ROSUVASTATIN CALCIUM 5 MG/1
0 TABLET ORAL
Qty: 0 | Refills: 1 | DISCHARGE

## 2021-06-08 RX ORDER — ALPRAZOLAM 0.25 MG
0 TABLET ORAL
Qty: 0 | Refills: 0 | DISCHARGE

## 2021-06-08 RX ORDER — SODIUM CHLORIDE 9 MG/ML
1750 INJECTION INTRAMUSCULAR; INTRAVENOUS; SUBCUTANEOUS ONCE
Refills: 0 | Status: COMPLETED | OUTPATIENT
Start: 2021-06-08 | End: 2021-06-08

## 2021-06-08 RX ORDER — CEFEPIME 1 G/1
2000 INJECTION, POWDER, FOR SOLUTION INTRAMUSCULAR; INTRAVENOUS ONCE
Refills: 0 | Status: COMPLETED | OUTPATIENT
Start: 2021-06-08 | End: 2021-06-08

## 2021-06-08 RX ADMIN — SODIUM CHLORIDE 1750 MILLILITER(S): 9 INJECTION INTRAMUSCULAR; INTRAVENOUS; SUBCUTANEOUS at 14:46

## 2021-06-08 RX ADMIN — CEFEPIME 2000 MILLIGRAM(S): 1 INJECTION, POWDER, FOR SOLUTION INTRAMUSCULAR; INTRAVENOUS at 14:46

## 2021-06-08 RX ADMIN — SODIUM CHLORIDE 75 MILLILITER(S): 9 INJECTION, SOLUTION INTRAVENOUS at 20:23

## 2021-06-08 RX ADMIN — Medication 975 MILLIGRAM(S): at 11:22

## 2021-06-08 RX ADMIN — SODIUM CHLORIDE 1750 MILLILITER(S): 9 INJECTION INTRAMUSCULAR; INTRAVENOUS; SUBCUTANEOUS at 11:21

## 2021-06-08 RX ADMIN — Medication 975 MILLIGRAM(S): at 13:32

## 2021-06-08 RX ADMIN — ATORVASTATIN CALCIUM 80 MILLIGRAM(S): 80 TABLET, FILM COATED ORAL at 21:28

## 2021-06-08 RX ADMIN — SODIUM CHLORIDE 125 MILLILITER(S): 9 INJECTION INTRAMUSCULAR; INTRAVENOUS; SUBCUTANEOUS at 13:32

## 2021-06-08 RX ADMIN — CEFEPIME 100 MILLIGRAM(S): 1 INJECTION, POWDER, FOR SOLUTION INTRAMUSCULAR; INTRAVENOUS at 13:32

## 2021-06-08 NOTE — H&P ADULT - HISTORY OF PRESENT ILLNESS
Patient is a 89 year old female with HTN, GERD, a fib on Xarelto, CKD3, lumbar compression of L1 presents to ED with one day of confusion. Patient was admitted two months ago with upper GI bleed requiring transfusions. Xarelto was restarted upon discharge. Yesterday patient was found by her family confused with bruising over her abdomen. Patient does not recall any falls. Patient was brought to ED by EMS and now mentating back at baseline per granddaughter.    In ED: patient in afib with RVR -120, hgb 6, now s/p 2 u PRBC, A&Ox3, TYRESE Cr 1.7, hypernatremia 147, UA OK, CTH old infarct no hemorrhage, CT ab/pel revealed sq hematoma over R lateral hip, duodenal thickening, no acute traumatic injury. CTA R hip no extravasation.           Patient is a 89 year old female with HTN, GERD, a fib on Xarelto, CKD3, lumbar compression of L1 presents to ED with one day of confusion. Patient was admitted two months ago with upper GI bleed requiring transfusions. Xarelto was restarted upon discharge. Yesterday patient was found by her family confused with bruising over her abdomen. Patient does not recall any falls. Patient was brought to ED by EMS and now mentating back at baseline per granddaughter.    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 CTA R hip no extravasation.

## 2021-06-08 NOTE — ED PROVIDER NOTE - OBJECTIVE STATEMENT
89 y/o female with hx of HTN, GERD, a.fib on xarelto, TYRESE, lumbar compression of L1 presents to the ED for confusion as per family. Since discharge from hospital , patient lives alone and family has been checking on patient daily. As per dave, patient was tired yesterday and today was found in chair with confusion . patient denies any falls although patient has large hematoma to right upper thigh. patient c/o dry mouth . As per family , patient turned off air condition and house was warm today. no dysuria or cough. no sick contacts. no weakness to extremities. no vomiting or diarrhea . patient states she forgets to take her medications - unsure how many doses she missed.

## 2021-06-08 NOTE — PHARMACOTHERAPY INTERVENTION NOTE - COMMENTS
Levofloxacin 750mg IVPB daily ordered, pt. has Cr. Cl. of 18.8ml/min. Recommend a stat dose of Levofloxacin 750mg followed by 500mg IVPB q48h.

## 2021-06-08 NOTE — ED PROVIDER NOTE - PHYSICAL EXAMINATION
Vital Signs: I have reviewed the initial vital signs.  Constitutional: well-nourished, no acute distress, normocephalic  Eyes: PERRLA, EOMI, pallor conjunctiva  ENT: dry mucous membranes  Cardiovascular: irregular rate, irregular rhythm, no murmur appreciated  Respiratory: unlabored respiratory effort, clear to auscultation bilaterally  Gastrointestinal: soft, non-tender, non-distended  abdomen, no pulsatile mass, light brown stool on rectal examination   Musculoskeletal: supple neck, no lower extremity edema, no bony tenderness, pelvis stable   Integumentary: +right upper thigh large hematoma extending to right lower back and buttock  Neurologic: awake, alert, follows commands , cranial nerves II-XII grossly intact, extremities’ motor and sensory functions grossly intact, no focal deficits, GCS 15  Psychiatric: appropriate mood, appropriate affect

## 2021-06-08 NOTE — ED PROVIDER NOTE - PROGRESS NOTE DETAILS
SR: Hb noted, rectal exam performed + brown stool. Consent for blood obtained. patient to have IV contrast despite lab values and decreased GFR patient with hematoma noted to right upper thigh extending to right flank. will preform trauma scan with bedside ultrasound. spoke with dr. abdul who accepts admission SR: called radiology for read. difficulty iv access. intent to preform ultrasound guided iv access preformed

## 2021-06-08 NOTE — H&P ADULT - NSHPLABSRESULTS_GEN_ALL_CORE
6.6    8.50  )-----------( 331      ( 08 Jun 2021 10:52 )             22.2     06-08    147<H>  |  112<H>  |  39<H>  ----------------------------<  110<H>  3.8   |  20  |  1.7<H>    Ca    9.1      08 Jun 2021 10:52    TPro  5.7<L>  /  Alb  3.4<L>  /  TBili  1.2  /  DBili  x   /  AST  19  /  ALT  7   /  AlkPhos  79  06-08        Urinalysis Basic - ( 08 Jun 2021 10:52 )    Color: Yellow / Appearance: Clear / SG: >=1.030 / pH: x  Gluc: x / Ketone: Trace  / Bili: Small / Urobili: 0.2 mg/dL   Blood: x / Protein: >=300 mg/dL / Nitrite: Negative   Leuk Esterase: Negative / RBC: 6-10 /HPF / WBC 1-2 /HPF   Sq Epi: x / Non Sq Epi: Few /HPF / Bacteria: Few    PT/INR - ( 08 Jun 2021 10:52 )   PT: 18.10 sec;   INR: 1.57 ratio      PTT - ( 08 Jun 2021 10:52 )  PTT:22.6 sec    CARDIAC MARKERS ( 08 Jun 2021 10:52 )  x     / x     / 61 U/L / x     / x        < from: CT Chest w/ IV Cont (06.08.21 @ 14:44) >    IMPRESSION:    Right lateral hip 5 cm subcutaneous hematoma. Otherwise, no evidence of acute traumatic injury to the chest, abdomen, or pelvis.    Duodenal wall thickening, differential diagnosis includes duodenitis and peptic ulcer disease.    Small right pleural effusion.    Chronic findings as above.      LEONOR PORTILLO M.D., RESIDENT RADIOLOGIST  This document has been electronically signed.  JOSH CHAMBERS MD; Attending Radiologist  This document has been electronically signed. Jun 8 2021  3:55PM      < from: CT Cervical Spine No Cont (06.08.21 @ 14:44) >    IMPRESSION:    CTHEAD:  No acute intracranial pathology or hemorrhage. No acute calvarial fracture.    Chronic right parietal lobe infarct.    CT CERVICAL SPINE:  No acute fracture or subluxation.      JD AHUJA M.D., ATTENDING RADIOLOGIST  This document has been electronically signed. Jun 8 2021  3:06PM      < from: CT Angio Lower Extremity w/ IV Cont, Right (06.08.21 @ 14:44) >    IMPRESSION:    Small right hip hematoma measuring 2.5 x 4.9 x 6.5 cm. No evidence of acute fractures. No evidence of active extravasation.    Diffuse bilateral lower extremity peripheral vascular disease without significant flow-limiting stenosis.      LEONOR PORTILLO M.D., RESIDENT RADIOLOGIST  This document has been electronically signed.  JOSSUE BALL MD; Attending Interventional Radiologist  This document has been electronically signed. Jun 8 2021  5:01PM

## 2021-06-08 NOTE — ED PROVIDER NOTE - ATTENDING CONTRIBUTION TO CARE
89 year old female with a pmh of afib on xarelto, htn hld  & chronic back pain on tylenol brought in by her granddaughter for confusion. As per granddaughter patient lives at home 89 year old female with a pmh of afib on xarelto, htn hld  & chronic back pain on tylenol brought in by her granddaughter for confusion. As per granddaughter who is bedside patient lives at home alone and when they went to check on her today they noticed the Air conditioning was off, her bra was placed backwards and that she hasn't taken any of her medications since sunday. When granddaughter arrived patient was more confused than usual however after giving her some sips of water noticed she was better. As per patient and granddaughter they did not notice a fever at home recent cough cp sob n/v abdominal pain or urinary complaints.  On exam  CONSTITUTIONAL: WA / WN / NAD  HEAD: NCAT  EYES: PERRL; EOMI;   ENT: Normal pharynx  NECK: Supple; no meningeal signs  CARD: IRR; nl S1/S2; no M/R/G.   RESP: Respiratory rate and effort are normal; breath sounds clear and equal bilaterally.  ABD: Soft, NT ND   MSK/EXT: No gross deformities; full range of motion.  SKIN: Warm and dry;   NEURO: AAOx3  PSYCH:  Normal Affect 89 year old female with a pmh of afib on xarelto, htn hld  & chronic back pain on tylenol brought in by her granddaughter for confusion. As per granddaughter who is bedside patient lives at home alone and when they went to check on her today they noticed the Air conditioning was off, her bra was placed backwards and that she hasn't taken any of her medications since sunday. When granddaughter arrived patient was more confused than usual however after giving her some sips of water noticed she was better. As per patient and granddaughter they did not notice a fever at home recent cough cp sob n/v abdominal pain or urinary complaints.  On exam  CONSTITUTIONAL: WA / WN / NAD  HEAD: NCAT  EYES: PERRL; EOMI;   ENT: Normal pharynx  NECK: Supple; no meningeal signs  CARD: IRR; nl S1/S2; no M/R/G.   RESP: Respiratory rate and effort are normal; breath sounds clear and equal bilaterally.  ABD: Soft, NT ND  + right flank ecchymosis.  MSK/EXT: + right lower extremity posterior ecchymosis  SKIN: Warm and dry;   NEURO: AAOx3  PSYCH:  Normal Affect

## 2021-06-08 NOTE — H&P ADULT - ASSESSMENT
Patient is a 89 year old female with HTN, HLD, GERD, a fib on Xarelto, CKD3, lumbar compression of L1 presents to ED with one day of confusion. Patient was admitted two months ago with upper GI bleed requiring transfusions. Xarelto was restarted upon discharge. Yesterday patient was found by her family confused with bruising over her abdomen. Patient does not recall any falls. Patient was brought to ED by EMS and now mentating back at baseline per granddaughter.    In ED: patient in afib with RVR -120, hgb 6, now s/p 2 u PRBC, A&Ox3, TYRESE Cr 1.7, hypernatremia 147, UA OK, CTH old infarct no hemorrhage, CT ab/pel revealed sq hematoma over R lateral hip, duodenal thickening, no acute traumatic injury. CTA R hip no extravasation.    admit to medicine    # r/o GI bleed  # acute blood loss anemia  - clears, no red dye  - GI consult  - s/p 2 u PRBC  - trend Hgb  - iron studies, hapto, retic, ldh  - hold xarelto  - Figueroaic is patient's PMD, will consult cardio  - protonix 40mg iv bid    # a fib in RVR  - EKG in AM  - hold xarelto  - continue lopressor    # TYRESE on CKD3  - IVF  - monitor renal fn    # HLD  # HTN  # L1 compression fx  - home meds      Diet: clears  Activity: OOB  DVT PPX: SCD  GI PPX: protonix IV bid  Code status: FULL CODE  Dispo: acute from home, independent   Patient is a 89 year old female with HTN, HLD, GERD, a fib on Xarelto, CKD3, lumbar compression of L1 presents to ED with one day of confusion. Patient was admitted two months ago with upper GI bleed requiring transfusions. Xarelto was restarted upon discharge. Yesterday patient was found by her family confused with bruising over her abdomen. Patient does not recall any falls. Patient was brought to ED by EMS and now mentating back at baseline per granddaughter.    In ED: patient presented 89F with afib RVR -120, hgb 6, now s/p 2 u PRBC, A&Ox3, TYRESE Cr 1.7 (baseline 1.4), hypernatremia 147, UA OK, CTH old infarct no hemorrhage, CT ab/pel revealed sq hematoma over R lateral hip, duodenal thickening, no acute traumatic injury. CTA R hip no extravasation. L basilar opacity on CXR    admit to medicine    # r/o GI bleed  # acute metabolic encephalopathy  # acute blood loss anemia  # fall  # R thigh hematoma  - clears, no red dye  - brown stool reported on ED rectal exam  - GI consult  - s/p 2 u PRBC  - trend Hgb  - iron studies, hapto, retic, ldh  - hold xarelto  - Jumanakovic is patient's PMD, will consult cardio  - protonix 40mg iv bid  - imaging reviewed- no extravasation of hematoma  - fall precaution  - FU urine culture    # sepsis  # L basilar opacity/effusion  - s/p cefepime & levaquin, continue  - fu urine and blood cultures    # a fib in RVR  - EKG in AM  - hold xarelto  - continue lopressor  - rate in 90s at time of exam    # TYRESE on CKD3  # hypernatremia  - IVF 1/2NS @75  - monitor renal fn    # HLD  # HTN  # L1 compression fx  - home meds  - avoid benzos and sedatives    Diet: clears  Activity: OOB  DVT PPX: SCD  GI PPX: protonix IV bid  Code status: FULL CODE  Dispo: acute from home, independent

## 2021-06-08 NOTE — ED ADULT TRIAGE NOTE - CHIEF COMPLAINT QUOTE
Pt brought in by ambulance from home. As per EMT "She was found at home in a room that was very hot. She hasn't been taking her medications. She was tachy and confused."

## 2021-06-08 NOTE — ED PROVIDER NOTE - NS ED ROS FT
Review of Systems    Constitutional: (-) fever/ chills (+)loss of appetite  Eyes (-) visual changes  ENT: (-) epistaxis (-) sore throat (-) ear pain  Cardiovascular: (-) chest pain, (-) syncope (-) palpitations  Respiratory: (-) cough, (-) shortness of breath  Gastrointestinal: (-) vomiting, (-) diarrhea (-) abdominal pain  : (-) dysuria , hematuria   neck: (-) neck pain or stiffness  Musculoskeletal:  (-) back pain, (-) joint pain   Integumentary: (+)bruising to right upper thigh  Neurological: (-) headache, (+) altered mental status  Psychiatric: (-) hallucinations or depression

## 2021-06-08 NOTE — ED PROVIDER NOTE - CARE PLAN
Principal Discharge DX:	Fever  Secondary Diagnosis:	Hydronephrosis  Secondary Diagnosis:	Pneumonia  Secondary Diagnosis:	Anemia  Secondary Diagnosis:	Hematoma of leg, right, initial encounter

## 2021-06-08 NOTE — H&P ADULT - NSHPPHYSICALEXAM_GEN_ALL_CORE
Vital Signs (24 Hrs):  T(C): 35.7 (06-08-21 @ 17:05), Max: 39.4 (06-08-21 @ 10:30)  HR: 96 (06-08-21 @ 17:05) (91 - 116)  BP: 143/65 (06-08-21 @ 17:05) (118/65 - 163/69)  RR: 18 (06-08-21 @ 17:05) (18 - 20)  SpO2: 98% (06-08-21 @ 17:05) (95% - 98%)  Wt(kg): --  Daily Height in cm: 165.1 (08 Jun 2021 10:30)    Daily     I&O's Summary    08 Jun 2021 07:01  -  08 Jun 2021 18:16  --------------------------------------------------------  IN: 1950 mL / OUT: 300 mL / NET: 1650 mL    PHYSICAL EXAM:  GENERAL: NAD, pale  SKIN: hematoma extending from R lateral thigh to low back  HEAD:  Atraumatic, Normocephalic  EYES: EOMI, PERRLA, conjunctiva and sclera clear  NECK: Supple, No JVD  CHEST/LUNG: Clear to auscultation bilaterally; No wheeze  HEART: irregular, No murmurs, rubs, or gallops. distal pulses 2+ and equal bilateral  ABDOMEN: Soft, Nontender, Nondistended; Bowel sounds present  EXTREMITIES:  No clubbing, cyanosis, or edema  CNS: AAOx3

## 2021-06-08 NOTE — ED PROVIDER NOTE - CLINICAL SUMMARY MEDICAL DECISION MAKING FREE TEXT BOX
89 year old female with a pmh of afib on xarelto, htn hld  & chronic back pain on tylenol brought in by her granddaughter for confusion. As per granddaughter who is bedside patient lives at home alone and when they went to check on her today they noticed the Air conditioning was off, her bra was placed backwards and that she hasn't taken any of her medications since sunday. VS reviewed. On exam patient was febrile, antiypretics and antibiotics given for +UA & Pna. Patient also found to have right flank and posterior right leg ecchymosis, trauma imaging obtained which demonstrated Small right hip hematoma measuring 2.5 x 4.9 x 6.5 cm. No evidence of acute fractures. No evidence of active extravasation. Patient also found to be anemic with Hb drop. Patient consented for blood and blood products given. Patient admitted for anemia, fever 2/2 uti & pneumonia.

## 2021-06-08 NOTE — ED ADULT NURSE NOTE - NSIMPLEMENTINTERV_GEN_ALL_ED
Implemented All Fall with Harm Risk Interventions:  [place] to call system. Call bell, personal items and telephone within reach. Instruct patient to call for assistance. Room bathroom lighting operational. Non-slip footwear when patient is off stretcher. Physically safe environment: no spills, clutter or unnecessary equipment. Stretcher in lowest position, wheels locked, appropriate side rails in place. Provide visual cue, wrist band, yellow gown, etc. Monitor gait and stability. Monitor for mental status changes and reorient to person, place, and time. Review medications for side effects contributing to fall risk. Reinforce activity limits and safety measures with patient and family. Provide visual clues: red socks.

## 2021-06-09 LAB
ANION GAP SERPL CALC-SCNC: 8 MMOL/L — SIGNIFICANT CHANGE UP (ref 7–14)
BASOPHILS # BLD AUTO: 0.01 K/UL — SIGNIFICANT CHANGE UP (ref 0–0.2)
BASOPHILS NFR BLD AUTO: 0.1 % — SIGNIFICANT CHANGE UP (ref 0–1)
BUN SERPL-MCNC: 31 MG/DL — HIGH (ref 10–20)
CALCIUM SERPL-MCNC: 7.9 MG/DL — LOW (ref 8.5–10.1)
CHLORIDE SERPL-SCNC: 115 MMOL/L — HIGH (ref 98–110)
CO2 SERPL-SCNC: 19 MMOL/L — SIGNIFICANT CHANGE UP (ref 17–32)
CREAT SERPL-MCNC: 1.3 MG/DL — SIGNIFICANT CHANGE UP (ref 0.7–1.5)
EOSINOPHIL # BLD AUTO: 0 K/UL — SIGNIFICANT CHANGE UP (ref 0–0.7)
EOSINOPHIL NFR BLD AUTO: 0 % — SIGNIFICANT CHANGE UP (ref 0–8)
GLUCOSE SERPL-MCNC: 99 MG/DL — SIGNIFICANT CHANGE UP (ref 70–99)
HCT VFR BLD CALC: 35.6 % — LOW (ref 37–47)
HGB BLD-MCNC: 10.9 G/DL — LOW (ref 12–16)
IMM GRANULOCYTES NFR BLD AUTO: 0.8 % — HIGH (ref 0.1–0.3)
IRON SATN MFR SERPL: 20 % — SIGNIFICANT CHANGE UP (ref 15–50)
IRON SATN MFR SERPL: 67 UG/DL — SIGNIFICANT CHANGE UP (ref 35–150)
LDH SERPL L TO P-CCNC: 324 — HIGH (ref 50–242)
LYMPHOCYTES # BLD AUTO: 0.84 K/UL — LOW (ref 1.2–3.4)
LYMPHOCYTES # BLD AUTO: 7.6 % — LOW (ref 20.5–51.1)
MAGNESIUM SERPL-MCNC: 1.9 MG/DL — SIGNIFICANT CHANGE UP (ref 1.8–2.4)
MCHC RBC-ENTMCNC: 30.5 PG — SIGNIFICANT CHANGE UP (ref 27–31)
MCHC RBC-ENTMCNC: 30.6 G/DL — LOW (ref 32–37)
MCV RBC AUTO: 99.7 FL — HIGH (ref 81–99)
MONOCYTES # BLD AUTO: 0.93 K/UL — HIGH (ref 0.1–0.6)
MONOCYTES NFR BLD AUTO: 8.4 % — SIGNIFICANT CHANGE UP (ref 1.7–9.3)
NEUTROPHILS # BLD AUTO: 9.19 K/UL — HIGH (ref 1.4–6.5)
NEUTROPHILS NFR BLD AUTO: 83.1 % — HIGH (ref 42.2–75.2)
NRBC # BLD: 0 /100 WBCS — SIGNIFICANT CHANGE UP (ref 0–0)
PLATELET # BLD AUTO: 264 K/UL — SIGNIFICANT CHANGE UP (ref 130–400)
POTASSIUM SERPL-MCNC: 3.8 MMOL/L — SIGNIFICANT CHANGE UP (ref 3.5–5)
POTASSIUM SERPL-SCNC: 3.8 MMOL/L — SIGNIFICANT CHANGE UP (ref 3.5–5)
RBC # BLD: 3.57 M/UL — LOW (ref 4.2–5.4)
RBC # BLD: 3.57 M/UL — LOW (ref 4.2–5.4)
RBC # FLD: 19.9 % — HIGH (ref 11.5–14.5)
RETICS #: 138.9 K/UL — HIGH (ref 25–125)
RETICS/RBC NFR: 3.9 % — HIGH (ref 0.5–1.5)
SODIUM SERPL-SCNC: 142 MMOL/L — SIGNIFICANT CHANGE UP (ref 135–146)
TIBC SERPL-MCNC: 330 UG/DL — SIGNIFICANT CHANGE UP (ref 220–430)
UIBC SERPL-MCNC: 263 UG/DL — SIGNIFICANT CHANGE UP (ref 110–370)
WBC # BLD: 11.06 K/UL — HIGH (ref 4.8–10.8)
WBC # FLD AUTO: 11.06 K/UL — HIGH (ref 4.8–10.8)

## 2021-06-09 PROCEDURE — 99223 1ST HOSP IP/OBS HIGH 75: CPT

## 2021-06-09 RX ORDER — SODIUM CHLORIDE 9 MG/ML
1000 INJECTION, SOLUTION INTRAVENOUS
Refills: 0 | Status: DISCONTINUED | OUTPATIENT
Start: 2021-06-09 | End: 2021-06-09

## 2021-06-09 RX ADMIN — PANTOPRAZOLE SODIUM 40 MILLIGRAM(S): 20 TABLET, DELAYED RELEASE ORAL at 06:43

## 2021-06-09 RX ADMIN — CEFEPIME 100 MILLIGRAM(S): 1 INJECTION, POWDER, FOR SOLUTION INTRAMUSCULAR; INTRAVENOUS at 17:08

## 2021-06-09 RX ADMIN — LOSARTAN POTASSIUM 100 MILLIGRAM(S): 100 TABLET, FILM COATED ORAL at 05:07

## 2021-06-09 RX ADMIN — ATORVASTATIN CALCIUM 80 MILLIGRAM(S): 80 TABLET, FILM COATED ORAL at 22:00

## 2021-06-09 RX ADMIN — CEFEPIME 100 MILLIGRAM(S): 1 INJECTION, POWDER, FOR SOLUTION INTRAMUSCULAR; INTRAVENOUS at 05:06

## 2021-06-09 RX ADMIN — Medication 50 MILLIGRAM(S): at 05:06

## 2021-06-09 RX ADMIN — Medication 50 MILLIGRAM(S): at 17:15

## 2021-06-09 RX ADMIN — PANTOPRAZOLE SODIUM 40 MILLIGRAM(S): 20 TABLET, DELAYED RELEASE ORAL at 17:15

## 2021-06-09 RX ADMIN — SODIUM CHLORIDE 50 MILLILITER(S): 9 INJECTION, SOLUTION INTRAVENOUS at 10:12

## 2021-06-09 NOTE — PHYSICAL THERAPY INITIAL EVALUATION ADULT - GENERAL OBSERVATIONS, REHAB EVAL
14:15-14:50 Chart reviewed. Patient available to be seen for physical therapy, denies pain, confirmed with RN. Pt rec'd in bed +2L nasal O2 in NAD

## 2021-06-09 NOTE — CONSULT NOTE ADULT - SUBJECTIVE AND OBJECTIVE BOX
Gastroenterology Consultation    89y year old  Female with anemia.     Patient is a 89y old  Female who presents with a chief complaint of anemia (09 Jun 2021 08:18)      HPI:  Patient is a 89 year old female with HTN, GERD, a fib on Xarelto, CKD3, lumbar compression of L1 presents to ED with one day of confusion. Patient was admitted two months ago with upper GI bleed requiring transfusions. This was assiciated with a perforation , presumably of the duodenum.  Xarelto was restarted upon discharge. Yesterday patient was found by her family confused with bruising over her abdomen. Patient does not recall any falls. Patient was brought to ED by EMS and now mentating back at baseline per granddaughter.    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 CTA R hip no extravasation.           (08 Jun 2021 18:05)      PAST MEDICAL & SURGICAL HISTORY:  Atrial fibrillation    GERD (gastroesophageal reflux disease)    HTN (hypertension)    No significant past surgical history        Allergies: No Known Allergies    Medications; atorvastatin 80 milliGRAM(s) Oral at bedtime  cefepime   IVPB 1000 milliGRAM(s) IV Intermittent every 12 hours  losartan 100 milliGRAM(s) Oral daily  metoprolol tartrate 50 milliGRAM(s) Oral two times a day  pantoprazole  Injectable 40 milliGRAM(s) IV Push every 12 hours  sodium chloride 0.45%. 1000 milliLiter(s) IV Continuous <Continuous>    Social History:  lives home alone  independent (08 Jun 2021 18:05)    FAMILY HISTORY:  No pertinent family history in first degree relatives      Physical Examination:  T(C): 35.7 (06-09-21 @ 05:37), Max: 39.4 (06-08-21 @ 10:30)  HR: 99 (06-09-21 @ 05:37) (91 - 116)  BP: 155/73 (06-09-21 @ 05:37) (118/65 - 163/69)  RR: 18 (06-09-21 @ 05:37) (18 - 20)  SpO2: 98% (06-08-21 @ 17:05) (95% - 98%)    GENERAL:  Appears stated age, well-groomed, well-nourished, no distress  HEENT:  NC/AT,  conjunctivae clear and pink, no thyromegaly, nodules, adenopathy, no JVD, sclera -anicteric  CHEST:  Full & symmetric excursion, no increased effort, breath sounds clear  HEART:  Irregular rhythm,  no abdominal bruitABDOMEN:  Soft, non-tender, non-distended, normoactive bowel sounds,  no masses ,no hepato-splenomegaly, no signs of chronic liver disease  EXTREMITy: large right hip hematoma  SKIN:  No rash/erythema/ecchymoses/petechiae/wounds/abscess/warm/dry  NEURO:  Alert, oriented, no asterixis, no tremor, no encephalopathy   Rectal brown    Data:                        9.9    9.38  )-----------( 234      ( 08 Jun 2021 21:44 )             32.2     06-08    146  |  119<H>  |  34<H>  ----------------------------<  118<H>  3.7   |  17  |  1.4    Ca    7.6<L>      08 Jun 2021 21:44    TPro  5.7<L>  /  Alb  3.4<L>  /  TBili  1.2  /  DBili  x   /  AST  19  /  ALT  7   /  AlkPhos  79  06-08    LIVER FUNCTIONS - ( 08 Jun 2021 10:52 )  Alb: 3.4 g/dL / Pro: 5.7 g/dL / ALK PHOS: 79 U/L / ALT: 7 U/L / AST: 19 U/L / GGT: x           PT/INR - ( 08 Jun 2021 10:52 )   PT: 18.10 sec;   INR: 1.57 ratio         PTT - ( 08 Jun 2021 10:52 )  PTT:22.6 sec          CT Abdomen and Pelvis w/ IV Cont:   EXAM:  CT ABDOMEN AND PELVIS IC        EXAM:  CT CHEST IC            PROCEDURE DATE:  06/08/2021            INTERPRETATION:  CLINICAL STATEMENT: Fall.    TECHNIQUE: Contiguous axial CT images were obtained from the thoracic inlet to the pubic symphysis following administration of 100c Optiray 320 intravenous contrast.  Oral contrast was not administered.  Reformatted images in the coronal and sagittal planes were acquired.    COMPARISON : CT abdomen and pelvis dated 4/15/2021.      FINDINGS:    CHEST:    [place] of the lungs are not imaged.    LUNGS/PLEURA/AIRWAYS: Central airways are patent. Small right pleural effusion. Bilateral left greater than right subsegmental atelectasis. Left lower lobe mucus plugging. No pneumothorax.    MEDIASTINUM/THORACIC NODES: No lymphadenopathy.    HEART/GREAT VESSELS: Heart is enlarged. No pericardial effusion Enlarged pulmonary artery measuring 3.3 cm.. Aortic and coronary artery calcifications.    OTHER: The thyroid gland is present with the right thyroid extending into the superior mediastinum. The thyroid gland is incompletely evaluated on CT.      ABDOMEN/PELVIS:    HEPATOBILIARY: Distended gallbladder. Otherwise, unremarkable.    SPLEEN: Unremarkable.    PANCREAS: Atrophic.    ADRENAL GLANDS: Unremarkable.    KIDNEYS: Symmetric renal enhancement. Mild fullness in the right renal pelvis without evidence of obstructing stone or mass. Scarred and atrophic left kidney. Subcentimeter hypodensities too small to further characterize..    ABDOMINOPELVIC NODES: No lymphadenopathy.    PELVIC ORGANS: Focus of air within the urinary bladder, correlation for recent instrumentation.    PERITONEUM/MESENTERY/BOWEL: No evidence of bowel obstruction, ascites or free air. Unremarkable appendix. Small umbilical hernia. Duodenal wall thickening.    BONES/SOFT TISSUES: Degenerative changes of the spine. Slight interval increase in L1 moderate compression deformity. Right thigh intramuscular lipoma, stable. Right lateral hip 5 cm subcutaneous hematoma.    OTHER: Atherosclerotic calcifications of the aorta and its branches      IMPRESSION:    Right lateral hip 5 cm subcutaneous hematoma. Otherwise, no evidence of acute traumatic injury to the chest, abdomen, or pelvis.    Duodenal wall thickening, differential diagnosis includes duodenitis and peptic ulcer disease.    Small right pleural effusion.    Chronic findings as above.                  LEONOR PORTILLO M.D., RESIDENT RADIOLOGIST  This document has been electronically signed.  JOSH CHAMBERS MD; Attending Radiologist  This document has been electronically signed. Jun 8 2021  3:55PM (06-08-21 @ 14:44)        Impression: 89y year old  Female with Anemia.  Etiology likely right hip hematoma from bleeding secondary to xarelto.  Stool is brown.  Risk benefit evaluation should be done for outpatient endoscopy and colonoscopy.  No indication for inpatient gi workup.  Recommendation:  Ava miramontes      Discussion of case with other physicians:    Personal review of imaging tracing or specimen:    Main GI issue improving/stable/not improving/unstable:    If procedure anticipated, were risks and benefits reviewed with the patient:    Were lab tests ordered or reviewed:    Were radiology tests ordered or reviewed:    Were old records reviewed :    Was history obtained from someone other than the patient :   Gastroenterology Consultation    89y year old  Female with anemia.     Patient is a 89y old  Female who presents with a chief complaint of anemia (09 Jun 2021 08:18)      HPI:  Patient is a 89 year old female with HTN, GERD, a fib on Xarelto, CKD3, lumbar compression of L1 presents to ED with one day of confusion. Patient was admitted two months ago with upper GI bleed requiring transfusions. This was assiciated with a perforation , presumably of the duodenum.  Xarelto was restarted upon discharge. Yesterday patient was found by her family confused with bruising over her abdomen. Patient does not recall any falls. Patient was brought to ED by EMS and now mentating back at baseline per granddaughter.    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 CTA R hip no extravasation.           (08 Jun 2021 18:05)      PAST MEDICAL & SURGICAL HISTORY:  Atrial fibrillation    GERD (gastroesophageal reflux disease)    HTN (hypertension)    No significant past surgical history        Allergies: No Known Allergies    Medications; atorvastatin 80 milliGRAM(s) Oral at bedtime  cefepime   IVPB 1000 milliGRAM(s) IV Intermittent every 12 hours  losartan 100 milliGRAM(s) Oral daily  metoprolol tartrate 50 milliGRAM(s) Oral two times a day  pantoprazole  Injectable 40 milliGRAM(s) IV Push every 12 hours  sodium chloride 0.45%. 1000 milliLiter(s) IV Continuous <Continuous>    Social History:  lives home alone  independent (08 Jun 2021 18:05)    FAMILY HISTORY:  No pertinent family history in first degree relatives      Physical Examination:  T(C): 35.7 (06-09-21 @ 05:37), Max: 39.4 (06-08-21 @ 10:30)  HR: 99 (06-09-21 @ 05:37) (91 - 116)  BP: 155/73 (06-09-21 @ 05:37) (118/65 - 163/69)  RR: 18 (06-09-21 @ 05:37) (18 - 20)  SpO2: 98% (06-08-21 @ 17:05) (95% - 98%)    GENERAL:  Appears stated age, well-groomed, well-nourished, no distress  HEENT:  NC/AT,  conjunctivae clear and pink, no thyromegaly, nodules, adenopathy, no JVD, sclera -anicteric  CHEST:  Full & symmetric excursion, no increased effort, breath sounds clear  HEART:  Irregular rhythm,  no abdominal bruitABDOMEN:  Soft, non-tender, non-distended, normoactive bowel sounds,  no masses ,no hepato-splenomegaly, no signs of chronic liver disease  EXTREMITy: large right hip hematoma  SKIN:  No rash/erythema/ecchymoses/petechiae/wounds/abscess/warm/dry  NEURO:  Alert, oriented, no asterixis, no tremor, no encephalopathy   Rectal brown    Data:                        9.9    9.38  )-----------( 234      ( 08 Jun 2021 21:44 )             32.2     06-08    146  |  119<H>  |  34<H>  ----------------------------<  118<H>  3.7   |  17  |  1.4    Ca    7.6<L>      08 Jun 2021 21:44    TPro  5.7<L>  /  Alb  3.4<L>  /  TBili  1.2  /  DBili  x   /  AST  19  /  ALT  7   /  AlkPhos  79  06-08    LIVER FUNCTIONS - ( 08 Jun 2021 10:52 )  Alb: 3.4 g/dL / Pro: 5.7 g/dL / ALK PHOS: 79 U/L / ALT: 7 U/L / AST: 19 U/L / GGT: x           PT/INR - ( 08 Jun 2021 10:52 )   PT: 18.10 sec;   INR: 1.57 ratio         PTT - ( 08 Jun 2021 10:52 )  PTT:22.6 sec          CT Abdomen and Pelvis w/ IV Cont:   EXAM:  CT ABDOMEN AND PELVIS IC        EXAM:  CT CHEST IC            PROCEDURE DATE:  06/08/2021            INTERPRETATION:  CLINICAL STATEMENT: Fall.    TECHNIQUE: Contiguous axial CT images were obtained from the thoracic inlet to the pubic symphysis following administration of 100c Optiray 320 intravenous contrast.  Oral contrast was not administered.  Reformatted images in the coronal and sagittal planes were acquired.    COMPARISON : CT abdomen and pelvis dated 4/15/2021.      FINDINGS:    CHEST:    [place] of the lungs are not imaged.    LUNGS/PLEURA/AIRWAYS: Central airways are patent. Small right pleural effusion. Bilateral left greater than right subsegmental atelectasis. Left lower lobe mucus plugging. No pneumothorax.    MEDIASTINUM/THORACIC NODES: No lymphadenopathy.    HEART/GREAT VESSELS: Heart is enlarged. No pericardial effusion Enlarged pulmonary artery measuring 3.3 cm.. Aortic and coronary artery calcifications.    OTHER: The thyroid gland is present with the right thyroid extending into the superior mediastinum. The thyroid gland is incompletely evaluated on CT.      ABDOMEN/PELVIS:    HEPATOBILIARY: Distended gallbladder. Otherwise, unremarkable.    SPLEEN: Unremarkable.    PANCREAS: Atrophic.    ADRENAL GLANDS: Unremarkable.    KIDNEYS: Symmetric renal enhancement. Mild fullness in the right renal pelvis without evidence of obstructing stone or mass. Scarred and atrophic left kidney. Subcentimeter hypodensities too small to further characterize..    ABDOMINOPELVIC NODES: No lymphadenopathy.    PELVIC ORGANS: Focus of air within the urinary bladder, correlation for recent instrumentation.    PERITONEUM/MESENTERY/BOWEL: No evidence of bowel obstruction, ascites or free air. Unremarkable appendix. Small umbilical hernia. Duodenal wall thickening.    BONES/SOFT TISSUES: Degenerative changes of the spine. Slight interval increase in L1 moderate compression deformity. Right thigh intramuscular lipoma, stable. Right lateral hip 5 cm subcutaneous hematoma.    OTHER: Atherosclerotic calcifications of the aorta and its branches      IMPRESSION:    Right lateral hip 5 cm subcutaneous hematoma. Otherwise, no evidence of acute traumatic injury to the chest, abdomen, or pelvis.    Duodenal wall thickening, differential diagnosis includes duodenitis and peptic ulcer disease.    Small right pleural effusion.    Chronic findings as above.                  LEONOR PORTILLO M.D., RESIDENT RADIOLOGIST  This document has been electronically signed.  JOSH CHAMBERS MD; Attending Radiologist  This document has been electronically signed. Jun 8 2021  3:55PM (06-08-21 @ 14:44)

## 2021-06-09 NOTE — PROGRESS NOTE ADULT - SUBJECTIVE AND OBJECTIVE BOX
[place]  89y  Female      Patient is a 89y old  Female who presents with a chief complaint of anemia (08 Jun 2021 18:05)    s/p fall    REVIEW OF SYSTEMS:  CONSTITUTIONAL: No fever, weight loss, or fatigue  EYES: No eye pain, visual disturbances, or discharge  ENMT:  No difficulty hearing, tinnitus, vertigo; No sinus or throat pain  NECK: No pain or stiffness  BREASTS: No pain, masses, or nipple discharge  RESPIRATORY: No cough, wheezing, chills or hemoptysis; No shortness of breath  CARDIOVASCULAR: No chest pain, palpitations, dizziness, or leg swelling  GASTROINTESTINAL: No abdominal or epigastric pain. No nausea, vomiting, or hematemesis; No diarrhea or constipation. No melena or hematochezia.  GENITOURINARY: No dysuria, frequency, hematuria, or incontinence  NEUROLOGICAL: No headaches,  SKIN: No itching, burning, rashes, or lesions   LYMPH NODES: No enlarged glands  MUSCULOSKELETAL: No joint pain or swelling; No muscle, back, rt hip brusie+  PSYCHIATRIC: No depression, anxiety, mood swings, or difficulty sleeping:  No pertinent family history in first degree relatives      T(C): 35.7 (06-09-21 @ 05:37), Max: 39.4 (06-08-21 @ 10:30)  HR: 99 (06-09-21 @ 05:37) (91 - 116)  BP: 155/73 (06-09-21 @ 05:37) (118/65 - 163/69)  RR: 18 (06-09-21 @ 05:37) (18 - 20)  SpO2: 98% (06-08-21 @ 17:05) (95% - 98%)  Wt(kg): --Vital Signs Last 24 Hrs  T(C): 35.7 (09 Jun 2021 05:37), Max: 39.4 (08 Jun 2021 10:30)  T(F): 96.2 (09 Jun 2021 05:37), Max: 102.9 (08 Jun 2021 10:30)  HR: 99 (09 Jun 2021 05:37) (91 - 116)  BP: 155/73 (09 Jun 2021 05:37) (118/65 - 163/69)  BP(mean): --  RR: 18 (09 Jun 2021 05:37) (18 - 20)  SpO2: 98% (08 Jun 2021 17:05) (95% - 98%)  No Known Allergies      PHYSICAL EXAM:  GENERAL: NAD,   HEAD:  Atraumatic, Normocephalic  EYES: EOMI, PERRLA, conjunctiva and sclera clear  ENMT: No tonsillar erythema, exudates, or enlargement;   NECK: Supple, No JVD, Normal thyroid  NERVOUS SYSTEM:  Alert & Oriented   CHEST/LUNG: Clear to percussion bilaterally; No rales, rhonchi, wheezing, or rubs  HEART: Regular rate and rhythm; No murmurs, rubs, or gallops  ABDOMEN: Soft, Nontender, Nondistended; Bowel sounds present  EXTREMITIES:  No clubbing, cyanosis, or edema,rt hip brusie=,ecchymosis+  LYMPH: No lymphadenopathy noted  SKIN: No rashes or lesions      LABS:  06-08    146  |  119<H>  |  34<H>  ----------------------------<  118<H>  3.7   |  17  |  1.4    Ca    7.6<L>      08 Jun 2021 21:44    TPro  5.7<L>  /  Alb  3.4<L>  /  TBili  1.2  /  DBili  x   /  AST  19  /  ALT  7   /  AlkPhos  79  06-08                          9.9    9.38  )-----------( 234      ( 08 Jun 2021 21:44 )             32.2     < from: 12 Lead ECG (06.08.21 @ 10:48) >    Diagnosis Line Atrial fibrillation with rapid ventricular response  Poor R wave progression  Nonspecific ST-T changes  Confirmed by EBEN TOLEDO MD ([id number]) on 6/8/2021 12:40:40 PM    < end of copied text >    < from: CT Angio Lower Extremity w/ IV Cont, Right (06.08.21 @ 14:44) >  Small right hip hematoma measuring 2.5 x 4.9 x 6.5 cm. No evidence of acute fractures. No evidence of active extravasation.    Diffuse bilateral lower extremity peripheral vascular disease without significant flow-limiting stenosis.    < end of copied text >  < from: CT Abdomen and Pelvis w/ IV Cont (06.08.21 @ 14:44) >  Right lateral hip 5 cm subcutaneous hematoma. Otherwise, no evidence of acute traumatic injury to the chest, abdomen, or pelvis.    Duodenal wall thickening, differential diagnosis includes duodenitis and peptic ulcer disease.    Small right pleural effusion.    Chronic findings as above.    < end of copied text >  < from: CT Cervical Spine No Cont (06.08.21 @ 14:44) >  No acute intracranial pathology or hemorrhage. No acute calvarial fracture.    Chronic right parietal lobe infarct.    CT CERVICAL SPINE:  No acute fracture or subluxation.    < end of copied text >    RADIOLOGY & ADDITIONAL TESTS:    MEDICATION:  atorvastatin 80 milliGRAM(s) Oral at bedtime  cefepime   IVPB 1000 milliGRAM(s) IV Intermittent every 12 hours  losartan 100 milliGRAM(s) Oral daily  metoprolol tartrate 50 milliGRAM(s) Oral two times a day  pantoprazole  Injectable 40 milliGRAM(s) IV Push every 12 hours  sodium chloride 0.45%. 1000 milliLiter(s) IV Continuous <Continuous>      HEALTH ISSUES - PROBLEM Dx:    s/p fall rt hip hematoma off xarelto  anemia acute blood loss s/p 2 units prbc now hgb 9.9,gi consult  hx chronic a fib xarelto,cardiology  sepsis probably UTI cefepime  GERD on protonix   hx rt parietal infarct   ckd#3 stable

## 2021-06-09 NOTE — PHYSICAL THERAPY INITIAL EVALUATION ADULT - ADDITIONAL COMMENTS
Pt poor historian, reports she lives alone, and uses RW or st cane. Pt says there are 6 steps outside house with railings, and reports level living inside home.

## 2021-06-10 LAB
ALBUMIN SERPL ELPH-MCNC: 2.9 G/DL — LOW (ref 3.5–5.2)
ALP SERPL-CCNC: 69 U/L — SIGNIFICANT CHANGE UP (ref 30–115)
ALT FLD-CCNC: 8 U/L — SIGNIFICANT CHANGE UP (ref 0–41)
ANION GAP SERPL CALC-SCNC: 11 MMOL/L — SIGNIFICANT CHANGE UP (ref 7–14)
AST SERPL-CCNC: 22 U/L — SIGNIFICANT CHANGE UP (ref 0–41)
BILIRUB SERPL-MCNC: 1.1 MG/DL — SIGNIFICANT CHANGE UP (ref 0.2–1.2)
BUN SERPL-MCNC: 26 MG/DL — HIGH (ref 10–20)
CALCIUM SERPL-MCNC: 8.3 MG/DL — LOW (ref 8.5–10.1)
CHLORIDE SERPL-SCNC: 112 MMOL/L — HIGH (ref 98–110)
CK SERPL-CCNC: 99 U/L — SIGNIFICANT CHANGE UP (ref 0–225)
CO2 SERPL-SCNC: 20 MMOL/L — SIGNIFICANT CHANGE UP (ref 17–32)
CREAT SERPL-MCNC: 1.4 MG/DL — SIGNIFICANT CHANGE UP (ref 0.7–1.5)
CULTURE RESULTS: SIGNIFICANT CHANGE UP
FERRITIN SERPL-MCNC: 50 NG/ML — SIGNIFICANT CHANGE UP (ref 15–150)
GLUCOSE SERPL-MCNC: 92 MG/DL — SIGNIFICANT CHANGE UP (ref 70–99)
HAPTOGLOB SERPL-MCNC: 82 MG/DL — SIGNIFICANT CHANGE UP (ref 34–200)
HCT VFR BLD CALC: 35.6 % — LOW (ref 37–47)
HGB BLD-MCNC: 10.8 G/DL — LOW (ref 12–16)
MCHC RBC-ENTMCNC: 30.2 PG — SIGNIFICANT CHANGE UP (ref 27–31)
MCHC RBC-ENTMCNC: 30.3 G/DL — LOW (ref 32–37)
MCV RBC AUTO: 99.4 FL — HIGH (ref 81–99)
NRBC # BLD: 0 /100 WBCS — SIGNIFICANT CHANGE UP (ref 0–0)
PLATELET # BLD AUTO: 204 K/UL — SIGNIFICANT CHANGE UP (ref 130–400)
POTASSIUM SERPL-MCNC: 4 MMOL/L — SIGNIFICANT CHANGE UP (ref 3.5–5)
POTASSIUM SERPL-SCNC: 4 MMOL/L — SIGNIFICANT CHANGE UP (ref 3.5–5)
PROT SERPL-MCNC: 4.9 G/DL — LOW (ref 6–8)
RBC # BLD: 3.58 M/UL — LOW (ref 4.2–5.4)
RBC # FLD: 19.9 % — HIGH (ref 11.5–14.5)
SODIUM SERPL-SCNC: 143 MMOL/L — SIGNIFICANT CHANGE UP (ref 135–146)
SPECIMEN SOURCE: SIGNIFICANT CHANGE UP
WBC # BLD: 11.32 K/UL — HIGH (ref 4.8–10.8)
WBC # FLD AUTO: 11.32 K/UL — HIGH (ref 4.8–10.8)

## 2021-06-10 RX ADMIN — CEFEPIME 100 MILLIGRAM(S): 1 INJECTION, POWDER, FOR SOLUTION INTRAMUSCULAR; INTRAVENOUS at 17:16

## 2021-06-10 RX ADMIN — PANTOPRAZOLE SODIUM 40 MILLIGRAM(S): 20 TABLET, DELAYED RELEASE ORAL at 17:24

## 2021-06-10 RX ADMIN — PANTOPRAZOLE SODIUM 40 MILLIGRAM(S): 20 TABLET, DELAYED RELEASE ORAL at 06:28

## 2021-06-10 RX ADMIN — Medication 50 MILLIGRAM(S): at 17:24

## 2021-06-10 RX ADMIN — CEFEPIME 100 MILLIGRAM(S): 1 INJECTION, POWDER, FOR SOLUTION INTRAMUSCULAR; INTRAVENOUS at 06:28

## 2021-06-10 RX ADMIN — Medication 50 MILLIGRAM(S): at 06:28

## 2021-06-10 RX ADMIN — LOSARTAN POTASSIUM 100 MILLIGRAM(S): 100 TABLET, FILM COATED ORAL at 06:28

## 2021-06-10 NOTE — PROGRESS NOTE ADULT - SUBJECTIVE AND OBJECTIVE BOX
Chart reviewed, patient examined. Pertinent results reviewed.  reviewed with the PA; specialist f/u reviewed  HD#2; pt is mildly confused    89y year old  Female with anemia.     Patient is a 89y old  Female who presented with a chief complaint of syncope- found by her family.      HPI:  Patient is a 89 year old female with HTN, GERD, a fib on Xarelto, CKD3, lumbar compression of L1 presents to ED with one day of confusion. Patient was admitted two months ago with upper GI bleed requiring transfusions. This was associated with a perforation , presumably of the duodenum.  Xarelto was restarted upon discharge. Yesterday patient was found by her family confused with bruising over her abdomen. Patient does not recall any falls. Patient was brought to ED by EMS and now mentating back at baseline per granddaughter. She had a fever in the ED.    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 CTA R hip no extravasation. She had pan-CTs, and blood & urine C&S.    Interval; VSS, but confused and weak; was seen by PT            PAST MEDICAL & SURGICAL HISTORY:  Atrial fibrillation    GERD (gastroesophageal reflux disease)    HTN (hypertension)    No significant past surgical history        Allergies: No Known Allergies    Medications; atorvastatin 80 milliGRAM(s) Oral at bedtime  cefepime   IVPB 1000 milliGRAM(s) IV Intermittent every 12 hours  losartan 100 milliGRAM(s) Oral daily  metoprolol tartrate 50 milliGRAM(s) Oral two times a day  pantoprazole  Injectable 40 milliGRAM(s) IV Push every 12 hours  sodium chloride 0.45%. 1000 milliLiter(s) IV Continuous <Continuous>    Social History:  lives home alone  independent (08 Jun 2021 18:05)    FAMILY HISTORY:  No pertinent family history in first degree relatives      Physical Examination:  T(C): 35.7 (06-09-21 @ 05:37), Max: 39.4 (06-08-21 @ 10:30)  HR: 99 (06-09-21 @ 05:37) (91 - 116)  BP: 155/73 (06-09-21 @ 05:37) (118/65 - 163/69)  RR: 18 (06-09-21 @ 05:37) (18 - 20)  SpO2: 98% (06-08-21 @ 17:05) (95% - 98%)    GENERAL:  Appears stated age, well-groomed, well-nourished, no distress; slightly confused; refuses to stand up  HEENT:  NC/AT,  conjunctivae clear and pink, no thyromegaly, nodules, adenopathy, no JVD, sclera -anicteric  CHEST:  Full & symmetric excursion, no increased effort, breath sounds clear  HEART:  IRR IRR, mod VR;     ABDOMEN:  Soft, non-tender, non-distended, normoactive bowel sounds,  no masses ,no hepato-splenomegaly, M  EXTREMITy: large right hip hematoma- NT  SKIN:  No rash/erythema/petechiae/wounds/abscess/warm/dry  NEURO:  Alert, oriented x 2; where- a restaurant in [facility] [place]- I came here for lunch;      gen weakness w/o gross focal deficit      Data:                            10.8   11.32 )-----------( 204      ( 10 Rubin 2021 08:20 )             35.6                     9.9    9.38  )-----------( 234      ( 08 Jun 2021 21:44 )             32.2     06-10    143  |  112<H>  |  26<H>  ----------------------------<  92  4.0   |  20  |  1.4    Ca    8.3<L>      10 Rubin 2021 08:20  Mg     1.9     06-09    TPro  4.9<L>  /  Alb  2.9<L>  /  TBili  1.1  /  DBili  x   /  AST  22  /  ALT  8   /  AlkPhos  69  06-10      06-08    146  |  119<H>  |  34<H>  ----------------------------<  118<H>  3.7   |  17  |  1.4    Ca    7.6<L>      08 Jun 2021 21:44    TPro  5.7<L>  /  Alb  3.4<L>  /  TBili  1.2  /  DBili  x   /  AST  19  /  ALT  7   /  AlkPhos  79  06-08    LIVER FUNCTIONS - ( 08 Jun 2021 10:52 )  Alb: 3.4 g/dL / Pro: 5.7 g/dL / ALK PHOS: 79 U/L / ALT: 7 U/L / AST: 19 U/L / GGT: x           PT/INR - ( 08 Jun 2021 10:52 )   PT: 18.10 sec;   INR: 1.57 ratio         PTT - ( 08 Jun 2021 10:52 )  PTT:22.6 sec          CT Abdomen and Pelvis w/ IV Cont:   EXAM:  CT ABDOMEN AND PELVIS IC        EXAM:  CT CHEST IC            PROCEDURE DATE:  06/08/2021            INTERPRETATION:  CLINICAL STATEMENT: Fall.    TECHNIQUE: Contiguous axial CT images were obtained from the thoracic inlet to the pubic symphysis following administration of 100c Optiray 320 intravenous contrast.  Oral contrast was not administered.  Reformatted images in the coronal and sagittal planes were acquired.    COMPARISON : CT abdomen and pelvis dated 4/15/2021.      FINDINGS:    CHEST:    [place] of the lungs are not imaged.    LUNGS/PLEURA/AIRWAYS: Central airways are patent. Small right pleural effusion. Bilateral left greater than right subsegmental atelectasis. Left lower lobe mucus plugging. No pneumothorax.    MEDIASTINUM/THORACIC NODES: No lymphadenopathy.    HEART/GREAT VESSELS: Heart is enlarged. No pericardial effusion Enlarged pulmonary artery measuring 3.3 cm.. Aortic and coronary artery calcifications.    OTHER: The thyroid gland is present with the right thyroid extending into the superior mediastinum. The thyroid gland is incompletely evaluated on CT.      ABDOMEN/PELVIS:    HEPATOBILIARY: Distended gallbladder. Otherwise, unremarkable.    SPLEEN: Unremarkable.    PANCREAS: Atrophic.    ADRENAL GLANDS: Unremarkable.    KIDNEYS: Symmetric renal enhancement. Mild fullness in the right renal pelvis without evidence of obstructing stone or mass. Scarred and atrophic left kidney. Subcentimeter hypodensities too small to further characterize..    ABDOMINOPELVIC NODES: No lymphadenopathy.    PELVIC ORGANS: Focus of air within the urinary bladder, correlation for recent instrumentation.    PERITONEUM/MESENTERY/BOWEL: No evidence of bowel obstruction, ascites or free air. Unremarkable appendix. Small umbilical hernia. Duodenal wall thickening.    BONES/SOFT TISSUES: Degenerative changes of the spine. Slight interval increase in L1 moderate compression deformity. Right thigh intramuscular lipoma, stable. Right lateral hip 5 cm subcutaneous hematoma.    OTHER: Atherosclerotic calcifications of the aorta and its branches      IMPRESSION:    Right lateral hip 5 cm subcutaneous hematoma. Otherwise, no evidence of acute traumatic injury to the chest, abdomen, or pelvis.    Duodenal wall thickening, differential diagnosis includes duodenitis and peptic ulcer disease.    Small right pleural effusion.    Chronic findings as above.                  LEONOR PORTILLO M.D., RESIDENT RADIOLOGIST  This document has been electronically signed.  JOSH CHAMBERS MD; Attending Radiologist  This document has been electronically signed. Jun 8 2021  3:55PM (06-08-21 @ 14:44)     Chart reviewed, patient examined. Pertinent results reviewed.  reviewed with the PA; specialist f/u reviewed  HD#2; pt is mildly confused    89y year old  Female with anemia.     Patient is a 89y old  Female who presented with a chief complaint of syncope- found by her family.      HPI:  Patient is a 89 year old female with HTN, GERD, a fib on Xarelto, CKD3, lumbar compression of L1 presents to ED with one day of confusion. Patient was admitted two months ago with upper GI bleed requiring transfusions. This was associated with a perforation , presumably of the duodenum.  Xarelto was restarted upon discharge. Yesterday patient was found by her family confused with bruising over her abdomen. Patient does not recall any falls. Patient was brought to ED by EMS and now mentating back at baseline per granddaughter. She had a fever in the ED.    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 CTA R hip no extravasation. She had pan-CTs, and blood & urine C&S.    Interval; VSS, but confused and weak; was seen by PT            PAST MEDICAL & SURGICAL HISTORY:  Atrial fibrillation    GERD (gastroesophageal reflux disease)    HTN (hypertension)    No significant past surgical history        Allergies: No Known Allergies  MEDICATIONS  (STANDING):  atorvastatin 80 milliGRAM(s) Oral at bedtime  cefepime   IVPB 1000 milliGRAM(s) IV Intermittent every 12 hours  losartan 100 milliGRAM(s) Oral daily  metoprolol tartrate 50 milliGRAM(s) Oral two times a day  pantoprazole  Injectable 40 milliGRAM(s) IV Push every 12 hours    MEDICATIONS  (PRN):    Social History:  lives home alone  independent (08 Jun 2021 18:05)    FAMILY HISTORY:  No pertinent family history in first degree relatives      Physical Examination:  Vital Signs Last 24 Hrs  T(C): 36.1 (10 Rubin 2021 05:11), Max: 36.3 (09 Jun 2021 14:19)  T(F): 97 (10 Rubin 2021 05:11), Max: 97.4 (09 Jun 2021 20:56)  HR: 89 (10 Rubin 2021 05:11) (77 - 94)  BP: 139/63 (10 Rubin 2021 05:11) (118/62 - 139/63)  BP(mean): --  RR: 16 (10 Rubin 2021 05:11) (16 - 16)  SpO2: --  GENERAL:  Appears stated age, well-groomed, well-nourished, no distress; slightly confused; refuses to stand up  HEENT:  NC/AT,  conjunctivae clear and pink, no thyromegaly, nodules, adenopathy, no JVD, sclera -anicteric  CHEST:  Full & symmetric excursion, no increased effort, breath sounds clear  HEART:  IRR IRR, mod VR;     ABDOMEN:  Soft, non-tender, non-distended, normoactive bowel sounds,  no masses ,no hepato-splenomegaly, M  EXTREMITy: large right hip hematoma- NT  SKIN:  No rash/erythema/petechiae/wounds/abscess/warm/dry  NEURO:  Alert, oriented x 2; where- a restaurant in [facility] [place]- I came here for lunch;      gen weakness w/o gross focal deficit      Data:                            10.8   11.32 )-----------( 204      ( 10 Rubin 2021 08:20 )             35.6                     9.9    9.38  )-----------( 234      ( 08 Jun 2021 21:44 )             32.2     06-10    143  |  112<H>  |  26<H>  ----------------------------<  92  4.0   |  20  |  1.4    Ca    8.3<L>      10 Rubin 2021 08:20  Mg     1.9     06-09    TPro  4.9<L>  /  Alb  2.9<L>  /  TBili  1.1  /  DBili  x   /  AST  22  /  ALT  8   /  AlkPhos  69  06-10      06-08    146  |  119<H>  |  34<H>  ----------------------------<  118<H>  3.7   |  17  |  1.4    Ca    7.6<L>      08 Jun 2021 21:44    TPro  5.7<L>  /  Alb  3.4<L>  /  TBili  1.2  /  DBili  x   /  AST  19  /  ALT  7   /  AlkPhos  79  06-08    LIVER FUNCTIONS - ( 08 Jun 2021 10:52 )  Alb: 3.4 g/dL / Pro: 5.7 g/dL / ALK PHOS: 79 U/L / ALT: 7 U/L / AST: 19 U/L / GGT: x           PT/INR - ( 08 Jun 2021 10:52 )   PT: 18.10 sec;   INR: 1.57 ratio         PTT - ( 08 Jun 2021 10:52 )  PTT:22.6 sec          CT Abdomen and Pelvis w/ IV Cont:   EXAM:  CT ABDOMEN AND PELVIS IC        EXAM:  CT CHEST IC            PROCEDURE DATE:  06/08/2021            INTERPRETATION:  CLINICAL STATEMENT: Fall.    TECHNIQUE: Contiguous axial CT images were obtained from the thoracic inlet to the pubic symphysis following administration of 100c Optiray 320 intravenous contrast.  Oral contrast was not administered.  Reformatted images in the coronal and sagittal planes were acquired.    COMPARISON : CT abdomen and pelvis dated 4/15/2021.      FINDINGS:    CHEST:    [place] of the lungs are not imaged.    LUNGS/PLEURA/AIRWAYS: Central airways are patent. Small right pleural effusion. Bilateral left greater than right subsegmental atelectasis. Left lower lobe mucus plugging. No pneumothorax.    MEDIASTINUM/THORACIC NODES: No lymphadenopathy.    HEART/GREAT VESSELS: Heart is enlarged. No pericardial effusion Enlarged pulmonary artery measuring 3.3 cm.. Aortic and coronary artery calcifications.    OTHER: The thyroid gland is present with the right thyroid extending into the superior mediastinum. The thyroid gland is incompletely evaluated on CT.      ABDOMEN/PELVIS:    HEPATOBILIARY: Distended gallbladder. Otherwise, unremarkable.    SPLEEN: Unremarkable.    PANCREAS: Atrophic.    ADRENAL GLANDS: Unremarkable.    KIDNEYS: Symmetric renal enhancement. Mild fullness in the right renal pelvis without evidence of obstructing stone or mass. Scarred and atrophic left kidney. Subcentimeter hypodensities too small to further characterize..    ABDOMINOPELVIC NODES: No lymphadenopathy.    PELVIC ORGANS: Focus of air within the urinary bladder, correlation for recent instrumentation.    PERITONEUM/MESENTERY/BOWEL: No evidence of bowel obstruction, ascites or free air. Unremarkable appendix. Small umbilical hernia. Duodenal wall thickening.    BONES/SOFT TISSUES: Degenerative changes of the spine. Slight interval increase in L1 moderate compression deformity. Right thigh intramuscular lipoma, stable. Right lateral hip 5 cm subcutaneous hematoma.    OTHER: Atherosclerotic calcifications of the aorta and its branches      IMPRESSION:    Right lateral hip 5 cm subcutaneous hematoma. Otherwise, no evidence of acute traumatic injury to the chest, abdomen, or pelvis.    Duodenal wall thickening, differential diagnosis includes duodenitis and peptic ulcer disease.    Small right pleural effusion.    Chronic findings as above.                  LEONOR PORTILLO M.D., RESIDENT RADIOLOGIST  This document has been electronically signed.  JOSH CHAMBERS MD; Attending Radiologist  This document has been electronically signed. Jun 8 2021  3:55PM (06-08-21 @ 14:44)

## 2021-06-10 NOTE — PROGRESS NOTE ADULT - ASSESSMENT
Impression: 89y year old  Female with Anemia.  GI opinion:  Etiology likely right hip hematoma from bleeding secondary to xarelto.  Stool is brown.  Risk benefit evaluation should be done for outpatient endoscopy and colonoscopy.  No indication for inpatient gi workup.  Recommendation:  Ava cbc  outpatient followup in [facility] if aggressive workup is desired; Pt is 89 yo with multiple comorbidities.    Syncope- unclear event; ?   s/p fall rt hip hematoma off xarelto- for 1 week  anemia acute blood loss s/p 2 units prbc now hgb 9.9, see gi consult-NTD at this time;  hx chronic a fib xarelto, cardiology will ask to see  sepsis probably UTI cefepime; have ID see and advise  GERD on protonix   hx rt parietal infarct   ckd#3 stable  deconditioning, weak- needs STR/PT- will speak to family              Impression: 89y year old  Female with Anemia.  GI opinion:  Etiology likely right hip hematoma from bleeding secondary to xarelto.  Stool is brown.  Risk benefit evaluation should be done for outpatient endoscopy and colonoscopy.  No indication for inpatient gi workup.  Recommendation:  Ava cbc  outpatient followup in [facility] if aggressive workup is desired; Pt is 89 yo with multiple comorbidities.    Syncope- unclear event; ?   s/p fall rt hip hematoma off xarelto- for 1 week  anemia acute blood loss s/p 2 units prbc now hgb 9.9, see gi consult-NTD at this time;  hx chronic a fib xarelto, cardiology will ask to see  sepsis probably UTI cefepime; have ID see and advise  Now w confusion- probable encephalopathy; original Head CT -noted;       STR w assist for now  GERD on protonix   hx rt parietal infarct   ckd#3 stable  deconditioning, weak- needs STR/PT- will speak to family

## 2021-06-11 PROCEDURE — 99221 1ST HOSP IP/OBS SF/LOW 40: CPT

## 2021-06-11 PROCEDURE — 93010 ELECTROCARDIOGRAM REPORT: CPT | Mod: NC

## 2021-06-11 RX ORDER — HEPARIN SODIUM 5000 [USP'U]/ML
5000 INJECTION INTRAVENOUS; SUBCUTANEOUS EVERY 12 HOURS
Refills: 0 | Status: DISCONTINUED | OUTPATIENT
Start: 2021-06-11 | End: 2021-06-17

## 2021-06-11 RX ADMIN — Medication 50 MILLIGRAM(S): at 06:06

## 2021-06-11 RX ADMIN — PANTOPRAZOLE SODIUM 40 MILLIGRAM(S): 20 TABLET, DELAYED RELEASE ORAL at 06:06

## 2021-06-11 RX ADMIN — LOSARTAN POTASSIUM 100 MILLIGRAM(S): 100 TABLET, FILM COATED ORAL at 06:06

## 2021-06-11 RX ADMIN — CEFEPIME 100 MILLIGRAM(S): 1 INJECTION, POWDER, FOR SOLUTION INTRAMUSCULAR; INTRAVENOUS at 06:06

## 2021-06-11 RX ADMIN — PANTOPRAZOLE SODIUM 40 MILLIGRAM(S): 20 TABLET, DELAYED RELEASE ORAL at 18:42

## 2021-06-11 RX ADMIN — ATORVASTATIN CALCIUM 80 MILLIGRAM(S): 80 TABLET, FILM COATED ORAL at 21:38

## 2021-06-11 RX ADMIN — Medication 50 MILLIGRAM(S): at 18:42

## 2021-06-11 RX ADMIN — HEPARIN SODIUM 5000 UNIT(S): 5000 INJECTION INTRAVENOUS; SUBCUTANEOUS at 18:42

## 2021-06-11 NOTE — PROGRESS NOTE ADULT - SUBJECTIVE AND OBJECTIVE BOX
[place]  89y  Female      Patient is a 89y old  Female who presents with a chief complaint of anemia (10 Rubin 2021 13:17)        REVIEW OF SYSTEMS:  CONSTITUTIONAL: No fever, weight loss, or fatigue  EYES: No eye pain, visual disturbances, or discharge  ENMT:  No difficulty hearing, tinnitus, vertigo; No sinus or throat pain  NECK: No pain or stiffness  BREASTS: No pain, masses, or nipple discharge  RESPIRATORY: No cough, wheezing, chills or hemoptysis; No shortness of breath  CARDIOVASCULAR: No chest pain, palpitations, dizziness, or leg swelling  GASTROINTESTINAL: No abdominal or epigastric pain. No nausea, vomiting, or hematemesis;   GENITOURINARY: No dysuria, frequency, hematuria, or incontinence  SKIN: No itching, burning, rashes, or lesions   LYMPH NODES: No enlarged glands  HEME/LYMPH: No easy bruising, or bleeding gums  ALLERY AND IMMUNOLOGIC: No hives or eczema  FAMILY HISTORY:  No pertinent family history in first degree relatives      T(C): --  HR: 86 (06-11-21 @ 05:52) (86 - 98)  BP: 132/63 (06-11-21 @ 05:52) (102/59 - 132/63)  RR: 16 (06-11-21 @ 05:52) (16 - 16)  SpO2: --  Wt(kg): --Vital Signs Last 24 Hrs  T(C): --  T(F): --  HR: 86 (11 Jun 2021 05:52) (86 - 98)  BP: 132/63 (11 Jun 2021 05:52) (102/59 - 132/63)  BP(mean): --  RR: 16 (11 Jun 2021 05:52) (16 - 16)  SpO2: --  No Known Allergies      PHYSICAL EXAM:  GENERAL: NAD,   HEAD:  Atraumatic, Normocephalic  EYES: EOMI, PERRLA, conjunctiva and sclera clear  ENMT: No tonsillar erythema, exudates, or enlargement;   NECK: Supple, No JVD, Normal thyroid  NERVOUS SYSTEM:  Alert & Oriented   CHEST/LUNG: Clear to percussion bilaterally; No rales, rhonchi, wheezing, or rubs  HEART: Regular rate and rhythm; No murmurs, rubs, or gallops  ABDOMEN: Soft, Nontender, Nondistended; Bowel sounds present  EXTREMITIES:  , No clubbing, cyanosis, or edema,rt hip,thigh ecchymosis+  LYMPH: No lymphadenopathy noted  SKIN: No rashes or lesions      LABS:  06-10    143  |  112<H>  |  26<H>  ----------------------------<  92  4.0   |  20  |  1.4    Ca    8.3<L>      10 Rubin 2021 08:20    TPro  4.9<L>  /  Alb  2.9<L>  /  TBili  1.1  /  DBili  x   /  AST  22  /  ALT  8   /  AlkPhos  69  06-10                          10.8   11.32 )-----------( 204      ( 10 Rubin 2021 08:20 )             35.6         RADIOLOGY & ADDITIONAL TESTS:    MEDICATION:  atorvastatin 80 milliGRAM(s) Oral at bedtime  losartan 100 milliGRAM(s) Oral daily  metoprolol tartrate 50 milliGRAM(s) Oral two times a day  pantoprazole  Injectable 40 milliGRAM(s) IV Push every 12 hours      HEALTH ISSUES - PROBLEM Dx:  s/p fall RT hip hematoma,contusion need pt  anemia acute blood loss s/p hematoma,s/p 2 units PRBC,off xarelto,gi note appreciated  s/p sepsis due to lt lung pneumonia ,atectasis,blood culture ,urine culture negative  chronic a fib high risk for bleeding x2 will discuss with family or low dose   ckd#3 stable will advance diet to regular  ataxic gait,rt hip contusion need rehab

## 2021-06-11 NOTE — CONSULT NOTE ADULT - SUBJECTIVE AND OBJECTIVE BOX
CARDIOLOGY CONSULT NOTE     CHIEF COMPLAINT/REASON FOR CONSULT:    HPI:  Patient is a 89 year old female with HTN, GERD, a fib on Xarelto, CKD3, lumbar compression of L1 presents to ED with one day of confusion. Patient was admitted two months ago with upper GI bleed requiring transfusions. Xarelto was restarted upon discharge. Yesterday patient was found by her family confused with bruising over her abdomen. Patient does not recall any falls. Patient was brought to ED by EMS and now mentating back at baseline per granddaughter.    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 CTA R hip no extravasation.           (08 Jun 2021 18:05)      PAST MEDICAL & SURGICAL HISTORY:  Atrial fibrillation    GERD (gastroesophageal reflux disease)    HTN (hypertension)    No significant past surgical history        Cardiac Risks:   [x ]HTN, [ ] DM, [ ] Smoking, [ ] FH,  [ ] Lipids        MEDICATIONS:  MEDICATIONS  (STANDING):  atorvastatin 80 milliGRAM(s) Oral at bedtime  heparin   Injectable 5000 Unit(s) SubCutaneous every 12 hours  losartan 100 milliGRAM(s) Oral daily  metoprolol tartrate 50 milliGRAM(s) Oral two times a day  pantoprazole  Injectable 40 milliGRAM(s) IV Push every 12 hours      FAMILY HISTORY:  No pertinent family history in first degree relatives        SOCIAL HISTORY:      [ ] Marital status    Allergies    No Known Allergies        	    REVIEW OF SYSTEMS:  CONSTITUTIONAL: No fever, weight loss, or fatigue  EYES: No eye pain, visual disturbances, or discharge  ENMT:  No difficulty hearing, tinnitus, vertigo; No sinus or throat pain  NECK: No pain or stiffness  RESPIRATORY: No cough, wheezing, chills or hemoptysis; No Shortness of Breath  CARDIOVASCULAR: No chest pain, palpitations, passing out, dizziness, or leg swelling  GASTROINTESTINAL: No abdominal or epigastric pain. No nausea, vomiting, or hematemesis; No diarrhea or constipation. No melena or hematochezia.  GENITOURINARY: No dysuria, frequency, hematuria, or incontinence  NEUROLOGICAL: No headaches, memory loss, loss of strength, numbness, or tremors  SKIN: No itching, burning, rashes, or lesions   	        PHYSICAL EXAM:  T(C): --  HR: 86 (06-11-21 @ 05:52) (86 - 98)  BP: 132/63 (06-11-21 @ 05:52) (102/59 - 132/63)  RR: 16 (06-11-21 @ 05:52) (16 - 16)  SpO2: --  Wt(kg): --  I&O's Summary      Appearance: Normal	  Psychiatry: A & O x 3, Mood & affect appropriate  HEENT:   Normal oral mucosa, PERRL, EOMI	  Lymphatic: No lymphadenopathy  Cardiovascular: Normal S1 S2,irreg  No JVD, I/Vi Callie lsb  Respiratory: Lungs clear to auscultation	  Gastrointestinal:  Soft, Non-tender, + BS	  Skin: No rashes, No ecchymoses, No cyanosis	  Neurologic: Non-focal  Extremities: Normal range of motion, No clubbing, cyanosis or edema  Vascular: Peripheral pulses palpable 2+ bilaterally      ECG:  	< from: 12 Lead ECG (06.08.21 @ 10:48) >  Diagnosis Line Atrial fibrillation with rapid ventricular response  Poor R wave progression  Nonspecific ST-T changes  Confirmed by TRE GARBER EBEN ([id number]) on 6/8/2021 12:40:40 PM    < end of copied text >      	  LABS:	 	    CARDIAC MARKERS:                                    10.8   11.32 )-----------( 204      ( 10 Rubin 2021 08:20 )             35.6     06-10    143  |  112<H>  |  26<H>  ----------------------------<  92  4.0   |  20  |  1.4    Ca    8.3<L>      10 Rubin 2021 08:20    TPro  4.9<L>  /  Alb  2.9<L>  /  TBili  1.1  /  DBili  x   /  AST  22  /  ALT  8   /  AlkPhos  69  06-10

## 2021-06-11 NOTE — CONSULT NOTE ADULT - ASSESSMENT
Patient very well known to me. Hx afib gi bleed. She fell at home . Hematoma anemia. She received transfusion..She is not confused now. Afib rate controlled. Now off AC. he needs rehab. Check ortho bp. Probable short term rehab. Off Ac she is at risk of embolic event. But need determine risk benifits AC . If she is at risk falling
    Impression: 89y year old  Female with Anemia.  Etiology likely right hip hematoma from bleeding secondary to xarelto.  Stool is brown.  Risk benefit evaluation should be done for outpatient endoscopy and colonoscopy.  No indication for inpatient gi workup.  Recommendation:  Ava cbc  outpatient followup in [facility] if aggressive workup is desired; Pt is 89 yo with multiple comorbidities.

## 2021-06-12 LAB
ANION GAP SERPL CALC-SCNC: 17 MMOL/L — HIGH (ref 7–14)
BUN SERPL-MCNC: 33 MG/DL — HIGH (ref 10–20)
CALCIUM SERPL-MCNC: 8.7 MG/DL — SIGNIFICANT CHANGE UP (ref 8.5–10.1)
CHLORIDE SERPL-SCNC: 109 MMOL/L — SIGNIFICANT CHANGE UP (ref 98–110)
CO2 SERPL-SCNC: 16 MMOL/L — LOW (ref 17–32)
CREAT SERPL-MCNC: 1.6 MG/DL — HIGH (ref 0.7–1.5)
GLUCOSE SERPL-MCNC: 97 MG/DL — SIGNIFICANT CHANGE UP (ref 70–99)
HCT VFR BLD CALC: 35.3 % — LOW (ref 37–47)
HGB BLD-MCNC: 10.9 G/DL — LOW (ref 12–16)
MCHC RBC-ENTMCNC: 30.4 PG — SIGNIFICANT CHANGE UP (ref 27–31)
MCHC RBC-ENTMCNC: 30.9 G/DL — LOW (ref 32–37)
MCV RBC AUTO: 98.3 FL — SIGNIFICANT CHANGE UP (ref 81–99)
NRBC # BLD: 0 /100 WBCS — SIGNIFICANT CHANGE UP (ref 0–0)
PLATELET # BLD AUTO: 253 K/UL — SIGNIFICANT CHANGE UP (ref 130–400)
POTASSIUM SERPL-MCNC: 3.7 MMOL/L — SIGNIFICANT CHANGE UP (ref 3.5–5)
POTASSIUM SERPL-SCNC: 3.7 MMOL/L — SIGNIFICANT CHANGE UP (ref 3.5–5)
RBC # BLD: 3.59 M/UL — LOW (ref 4.2–5.4)
RBC # FLD: 19.8 % — HIGH (ref 11.5–14.5)
SODIUM SERPL-SCNC: 142 MMOL/L — SIGNIFICANT CHANGE UP (ref 135–146)
WBC # BLD: 10.7 K/UL — SIGNIFICANT CHANGE UP (ref 4.8–10.8)
WBC # FLD AUTO: 10.7 K/UL — SIGNIFICANT CHANGE UP (ref 4.8–10.8)

## 2021-06-12 RX ORDER — PANTOPRAZOLE SODIUM 20 MG/1
40 TABLET, DELAYED RELEASE ORAL EVERY 12 HOURS
Refills: 0 | Status: DISCONTINUED | OUTPATIENT
Start: 2021-06-12 | End: 2021-06-17

## 2021-06-12 RX ADMIN — PANTOPRAZOLE SODIUM 40 MILLIGRAM(S): 20 TABLET, DELAYED RELEASE ORAL at 06:02

## 2021-06-12 RX ADMIN — HEPARIN SODIUM 5000 UNIT(S): 5000 INJECTION INTRAVENOUS; SUBCUTANEOUS at 17:38

## 2021-06-12 RX ADMIN — Medication 50 MILLIGRAM(S): at 06:02

## 2021-06-12 RX ADMIN — Medication 50 MILLIGRAM(S): at 17:38

## 2021-06-12 RX ADMIN — ATORVASTATIN CALCIUM 80 MILLIGRAM(S): 80 TABLET, FILM COATED ORAL at 21:06

## 2021-06-12 RX ADMIN — LOSARTAN POTASSIUM 100 MILLIGRAM(S): 100 TABLET, FILM COATED ORAL at 06:02

## 2021-06-12 RX ADMIN — HEPARIN SODIUM 5000 UNIT(S): 5000 INJECTION INTRAVENOUS; SUBCUTANEOUS at 06:02

## 2021-06-12 RX ADMIN — PANTOPRAZOLE SODIUM 40 MILLIGRAM(S): 20 TABLET, DELAYED RELEASE ORAL at 17:38

## 2021-06-12 NOTE — PROGRESS NOTE ADULT - SUBJECTIVE AND OBJECTIVE BOX
RAVICECILLE  89y  Female      Patient is a 89y old  Female who presents with a chief complaint of anemia (11 Jun 2021 09:16)    s/p fall rt hip hematoma    REVIEW OF SYSTEMS:  CONSTITUTIONAL: No fever, weight loss, or fatigue  EYES: No eye pain, visual disturbances, or discharge  ENMT:  No difficulty hearing, tinnitus, vertigo; No sinus or throat pain  NECK: No pain or stiffness  BREASTS: No pain, masses, or nipple discharge  RESPIRATORY: No cough, wheezing, chills or hemoptysis; No shortness of breath  CARDIOVASCULAR: No chest pain, palpitations, dizziness, or leg swelling  GASTROINTESTINAL: No abdominal or epigastric pain. No nausea, vomiting, or hematemesis; No diarrhea or constipation. No melena or hematochezia.  GENITOURINARY: No dysuria, frequency, hematuria, or incontinence  NEUROLOGICAL: No headaches, memory loss, loss of strength, numbness, or tremors  SKIN: No itching, burning, rashes, or lesions   MUSCULOSKELETAL: No joint pain or swelling; No muscle, back, or extremity pain,rt hip ecchymosis+  PSYCHIATRIC: No depression, anxiety, mood swings, or difficulty sleeping  HEME/LYMPH: No easy bruising, or bleeding gums  ALLERY AND IMMUNOLOGIC: No hives or eczema  FAMILY HISTORY:  No pertinent family history in first degree relatives      T(C): 36.2 (06-12-21 @ 05:08), Max: 36.9 (06-11-21 @ 13:53)  HR: 76 (06-12-21 @ 05:08) (76 - 94)  BP: 132/61 (06-12-21 @ 05:08) (128/68 - 132/61)  RR: 16 (06-12-21 @ 05:08) (16 - 16)  SpO2: --  Wt(kg): --Vital Signs Last 24 Hrs  T(C): 36.2 (12 Jun 2021 05:08), Max: 36.9 (11 Jun 2021 13:53)  T(F): 97.1 (12 Jun 2021 05:08), Max: 98.5 (11 Jun 2021 13:53)  HR: 76 (12 Jun 2021 05:08) (76 - 94)  BP: 132/61 (12 Jun 2021 05:08) (128/68 - 132/61)  BP(mean): --  RR: 16 (12 Jun 2021 05:08) (16 - 16)  SpO2: --  No Known Allergies      PHYSICAL EXAM:  GENERAL: NAD  HEAD:  Atraumatic, Normocephalic  EYES: EOMI, PERRLA, conjunctiva and sclera clear  ENMT: No tonsillar erythema, exudates, or enlargement;   NECK: Supple, No JVD, Normal thyroid  NERVOUS SYSTEM:  Alert & Oriented   CHEST/LUNG: Clear to percussion bilaterally; No rales, rhonchi, wheezing, or rubs  HEART: Regular rate and rhythm; No murmurs, rubs, or gallops  ABDOMEN: Soft, Nontender, Nondistended; Bowel sounds present  EXTREMITIES:  , No clubbing, cyanosis, or edema,rt hip ecchymosis+  LYMPH: No lymphadenopathy noted  SKIN: No rashes or lesions      LABS:  06-10    143  |  112<H>  |  26<H>  ----------------------------<  92  4.0   |  20  |  1.4    Ca    8.3<L>      10 Rubin 2021 08:20    TPro  4.9<L>  /  Alb  2.9<L>  /  TBili  1.1  /  DBili  x   /  AST  22  /  ALT  8   /  AlkPhos  69  06-10                          10.8   11.32 )-----------( 204      ( 10 Rubin 2021 08:20 )             35.6         RADIOLOGY & ADDITIONAL TESTS:    MEDICATION:  atorvastatin 80 milliGRAM(s) Oral at bedtime  heparin   Injectable 5000 Unit(s) SubCutaneous every 12 hours  losartan 100 milliGRAM(s) Oral daily  metoprolol tartrate 50 milliGRAM(s) Oral two times a day  pantoprazole    Tablet 40 milliGRAM(s) Oral every 12 hours      HEALTH ISSUES - PROBLEM Dx:    s/p fall rt hip hematoma ,rt hip contusion need rehab  anemia acute blood loss s/p rt hip hematoma,s/p 2 units prbc  chronic a fib off xarelto ,will discuss with family  ckd#3 stable  HTN on losartan       RAVICECILLE  89y  Female      Patient is a 89y old  Female who presents with a chief complaint of anemia (11 Jun 2021 09:16)    s/p fall rt hip hematoma    REVIEW OF SYSTEMS:  CONSTITUTIONAL: No fever, weight loss, or fatigue  EYES: No eye pain, visual disturbances, or discharge  ENMT:  No difficulty hearing, tinnitus, vertigo; No sinus or throat pain  NECK: No pain or stiffness  BREASTS: No pain, masses, or nipple discharge  RESPIRATORY: No cough, wheezing, chills or hemoptysis; No shortness of breath  CARDIOVASCULAR: No chest pain, palpitations, dizziness, or leg swelling  GASTROINTESTINAL: No abdominal or epigastric pain. No nausea, vomiting, or hematemesis; No diarrhea or constipation. No melena or hematochezia.  GENITOURINARY: No dysuria, frequency, hematuria, or incontinence  NEUROLOGICAL: No headaches, memory loss, loss of strength, numbness, or tremors  SKIN: No itching, burning, rashes, or lesions   MUSCULOSKELETAL: No joint pain or swelling; No muscle, back, or extremity pain,rt hip ecchymosis+  PSYCHIATRIC: No depression, anxiety, mood swings, or difficulty sleeping  HEME/LYMPH: No easy bruising, or bleeding gums  ALLERY AND IMMUNOLOGIC: No hives or eczema  FAMILY HISTORY:  No pertinent family history in first degree relatives      T(C): 36.2 (06-12-21 @ 05:08), Max: 36.9 (06-11-21 @ 13:53)  HR: 76 (06-12-21 @ 05:08) (76 - 94)  BP: 132/61 (06-12-21 @ 05:08) (128/68 - 132/61)  RR: 16 (06-12-21 @ 05:08) (16 - 16)  SpO2: --  Wt(kg): --Vital Signs Last 24 Hrs  T(C): 36.2 (12 Jun 2021 05:08), Max: 36.9 (11 Jun 2021 13:53)  T(F): 97.1 (12 Jun 2021 05:08), Max: 98.5 (11 Jun 2021 13:53)  HR: 76 (12 Jun 2021 05:08) (76 - 94)  BP: 132/61 (12 Jun 2021 05:08) (128/68 - 132/61)  BP(mean): --  RR: 16 (12 Jun 2021 05:08) (16 - 16)  SpO2: --  No Known Allergies      PHYSICAL EXAM:  GENERAL: NAD  HEAD:  Atraumatic, Normocephalic  EYES: EOMI, PERRLA, conjunctiva and sclera clear  ENMT: No tonsillar erythema, exudates, or enlargement;   NECK: Supple, No JVD, Normal thyroid  NERVOUS SYSTEM:  Alert & Oriented   CHEST/LUNG: Clear to percussion bilaterally; No rales, rhonchi, wheezing, or rubs  HEART: Regular rate and rhythm; No murmurs, rubs, or gallops  ABDOMEN: Soft, Nontender, Nondistended; Bowel sounds present  EXTREMITIES:  , No clubbing, cyanosis, or edema,rt hip ecchymosis+  LYMPH: No lymphadenopathy noted  SKIN: No rashes or lesions      LABS:  06-10    143  |  112<H>  |  26<H>  ----------------------------<  92  4.0   |  20  |  1.4    Ca    8.3<L>      10 Rubin 2021 08:20    TPro  4.9<L>  /  Alb  2.9<L>  /  TBili  1.1  /  DBili  x   /  AST  22  /  ALT  8   /  AlkPhos  69  06-10                          10.8   11.32 )-----------( 204      ( 10 Rubin 2021 08:20 )             35.6         RADIOLOGY & ADDITIONAL TESTS:    MEDICATION:  atorvastatin 80 milliGRAM(s) Oral at bedtime  heparin   Injectable 5000 Unit(s) SubCutaneous every 12 hours  losartan 100 milliGRAM(s) Oral daily  metoprolol tartrate 50 milliGRAM(s) Oral two times a day  pantoprazole    Tablet 40 milliGRAM(s) Oral every 12 hours      HEALTH ISSUES - PROBLEM Dx:    s/p fall rt hip hematoma ,rt hip contusion need rehab  anemia acute blood loss s/p rt hip hematoma,s/p 2 units prbc  chronic a fib off xarelto ,will discuss with family,lmot for grandson to call me backx2  ckd#3 stable  HTN on losartan

## 2021-06-12 NOTE — CHART NOTE - NSCHARTNOTEFT_GEN_A_CORE
Patient seen and examined throughout the course of the day.    Results of Labs/Imaging discussed as well as patient's plan.        -awaiting call back from family to discuss anticoagulation,  attending called x 2  -spoke with granddaughter at  [phone] stated her father is coming to the hospital today  -as per case management [facility] not able to take pt until Monday  -labs reviewed cr slightly up 1.6 from 1.4 pt on losartan will continue for now and monitor cr level  -pt will need COVID swab tomorrow for placement     All patient's/family questions answered.  Patient and family encouraged to contact PA with any further issues. Patient seen and examined throughout the course of the day.    Results of Labs/Imaging discussed as well as patient's plan.        -awaiting call back from family to discuss anticoagulation,  attending called x 2  -spoke with granddaughter at  [phone] stated her father is coming to the hospital today  -as per case management [facility] not able to take pt until Monday  -labs reviewed cr slightly up 1.6 from 1.4 pt on losartan will continue for now and monitor cr level  -pt will need COVID swab tomorrow for placement       Addendum   -spoke to pts son in law Jonathan Truong, says aware Dr. Manuel Fry  has called his son  -son in law says he will  discuss with his son and daughter for restarting anticoagulation and left the hospitalist know regarding decision    All patient's/family questions answered.  Patient and family encouraged to contact PA with any further issues.

## 2021-06-13 LAB
ANION GAP SERPL CALC-SCNC: 16 MMOL/L — HIGH (ref 7–14)
BUN SERPL-MCNC: 34 MG/DL — HIGH (ref 10–20)
CALCIUM SERPL-MCNC: 8.3 MG/DL — LOW (ref 8.5–10.1)
CHLORIDE SERPL-SCNC: 111 MMOL/L — HIGH (ref 98–110)
CO2 SERPL-SCNC: 17 MMOL/L — SIGNIFICANT CHANGE UP (ref 17–32)
CREAT SERPL-MCNC: 1.4 MG/DL — SIGNIFICANT CHANGE UP (ref 0.7–1.5)
CULTURE RESULTS: SIGNIFICANT CHANGE UP
CULTURE RESULTS: SIGNIFICANT CHANGE UP
GLUCOSE SERPL-MCNC: 105 MG/DL — HIGH (ref 70–99)
HCT VFR BLD CALC: 28.7 % — LOW (ref 37–47)
HGB BLD-MCNC: 9.1 G/DL — LOW (ref 12–16)
MCHC RBC-ENTMCNC: 30.6 PG — SIGNIFICANT CHANGE UP (ref 27–31)
MCHC RBC-ENTMCNC: 31.7 G/DL — LOW (ref 32–37)
MCV RBC AUTO: 96.6 FL — SIGNIFICANT CHANGE UP (ref 81–99)
NRBC # BLD: 0 /100 WBCS — SIGNIFICANT CHANGE UP (ref 0–0)
PLATELET # BLD AUTO: 213 K/UL — SIGNIFICANT CHANGE UP (ref 130–400)
POTASSIUM SERPL-MCNC: 3.4 MMOL/L — LOW (ref 3.5–5)
POTASSIUM SERPL-SCNC: 3.4 MMOL/L — LOW (ref 3.5–5)
RBC # BLD: 2.97 M/UL — LOW (ref 4.2–5.4)
RBC # FLD: 19.8 % — HIGH (ref 11.5–14.5)
SODIUM SERPL-SCNC: 144 MMOL/L — SIGNIFICANT CHANGE UP (ref 135–146)
SPECIMEN SOURCE: SIGNIFICANT CHANGE UP
SPECIMEN SOURCE: SIGNIFICANT CHANGE UP
WBC # BLD: 9.1 K/UL — SIGNIFICANT CHANGE UP (ref 4.8–10.8)
WBC # FLD AUTO: 9.1 K/UL — SIGNIFICANT CHANGE UP (ref 4.8–10.8)

## 2021-06-13 RX ORDER — HALOPERIDOL DECANOATE 100 MG/ML
3 INJECTION INTRAMUSCULAR ONCE
Refills: 0 | Status: COMPLETED | OUTPATIENT
Start: 2021-06-13 | End: 2021-06-13

## 2021-06-13 RX ORDER — ACETAMINOPHEN 500 MG
650 TABLET ORAL EVERY 6 HOURS
Refills: 0 | Status: DISCONTINUED | OUTPATIENT
Start: 2021-06-13 | End: 2021-06-17

## 2021-06-13 RX ADMIN — PANTOPRAZOLE SODIUM 40 MILLIGRAM(S): 20 TABLET, DELAYED RELEASE ORAL at 05:36

## 2021-06-13 RX ADMIN — Medication 650 MILLIGRAM(S): at 09:10

## 2021-06-13 RX ADMIN — HEPARIN SODIUM 5000 UNIT(S): 5000 INJECTION INTRAVENOUS; SUBCUTANEOUS at 18:22

## 2021-06-13 RX ADMIN — PANTOPRAZOLE SODIUM 40 MILLIGRAM(S): 20 TABLET, DELAYED RELEASE ORAL at 18:23

## 2021-06-13 RX ADMIN — LOSARTAN POTASSIUM 100 MILLIGRAM(S): 100 TABLET, FILM COATED ORAL at 05:36

## 2021-06-13 RX ADMIN — Medication 50 MILLIGRAM(S): at 05:36

## 2021-06-13 RX ADMIN — HEPARIN SODIUM 5000 UNIT(S): 5000 INJECTION INTRAVENOUS; SUBCUTANEOUS at 05:36

## 2021-06-13 RX ADMIN — Medication 50 MILLIGRAM(S): at 18:24

## 2021-06-13 RX ADMIN — ATORVASTATIN CALCIUM 80 MILLIGRAM(S): 80 TABLET, FILM COATED ORAL at 21:39

## 2021-06-13 RX ADMIN — Medication 650 MILLIGRAM(S): at 09:40

## 2021-06-13 RX ADMIN — HALOPERIDOL DECANOATE 3 MILLIGRAM(S): 100 INJECTION INTRAMUSCULAR at 01:36

## 2021-06-13 NOTE — PROGRESS NOTE ADULT - SUBJECTIVE AND OBJECTIVE BOX
RAVICECIL[place]  89y  Female      Patient is a 89y old  Female who presents with a chief complaint of anemia (12 Jun 2021 07:23)    s/p 2 units PRBC    REVIEW OF SYSTEMS:  CONSTITUTIONAL: No fever, weight loss, or fatigue  EYES: No eye pain, visual disturbances, or discharge  ENMT:  No difficulty hearing, tinnitus, vertigo; No sinus or throat pain  NECK: No pain or stiffness  BREASTS: No pain, masses, or nipple discharge  RESPIRATORY: No cough, wheezing, chills or hemoptysis; No shortness of breath  CARDIOVASCULAR: No chest pain, palpitations, dizziness, or leg swelling  GASTROINTESTINAL: No abdominal or epigastric pain. No nausea, vomiting, or hematemesis;   GENITOURINARY: No dysuria, frequency, hematuria, or incontinence  MUSCULOSKELETAL: No joint pain or swelling; No muscle, back, or extremity pain  PSYCHIATRIC: No depression, anxiety, mood swings, or difficulty sleeping  HEME/LYMPH: No easy bruising, or bleeding gums  ALLERY AND IMMUNOLOGIC: No hives or eczema  FAMILY HISTORY:  No pertinent family history in first degree relatives      T(C): 35.9 (06-13-21 @ 05:27), Max: 35.9 (06-12-21 @ 20:57)  HR: 85 (06-13-21 @ 05:27) (79 - 87)  BP: 150/72 (06-13-21 @ 05:27) (150/72 - 171/77)  RR: 16 (06-13-21 @ 05:27) (16 - 16)  SpO2: --  Wt(kg): --Vital Signs Last 24 Hrs  T(C): 35.9 (13 Jun 2021 05:27), Max: 35.9 (12 Jun 2021 20:57)  T(F): 96.7 (13 Jun 2021 05:27), Max: 96.7 (12 Jun 2021 20:57)  HR: 85 (13 Jun 2021 05:27) (79 - 87)  BP: 150/72 (13 Jun 2021 05:27) (150/72 - 171/77)  BP(mean): --  RR: 16 (13 Jun 2021 05:27) (16 - 16)  SpO2: --  No Known Allergies      PHYSICAL EXAM:  GENERAL: NAD,   HEAD:  Atraumatic, Normocephalic  EYES: EOMI, PERRLA, conjunctiva and sclera clear  ENMT: No tonsillar erythema, exudates, or enlargement;   NECK: Supple, No JVD, Normal thyroid  NERVOUS SYSTEM:  Alert & Oriented   CHEST/LUNG: Clear to percussion bilaterally; No rales, rhonchi, wheezing, or rubs  HEART: Regular rate and rhythm; No murmurs, rubs, or gallops  ABDOMEN: Soft, Nontender, Nondistended; Bowel sounds present  EXTREMITIES:   No clubbing, cyanosis, or edema,rt thigh hematoma  LYMPH: No lymphadenopathy noted  SKIN: No rashes or lesions      LABS:  06-12    142  |  109  |  33<H>  ----------------------------<  97  3.7   |  16<L>  |  1.6<H>    Ca    8.7      12 Jun 2021 07:13                          10.9   10.70 )-----------( 253      ( 12 Jun 2021 07:13 )             35.3           RADIOLOGY & ADDITIONAL TESTS:    MEDICATION:  atorvastatin 80 milliGRAM(s) Oral at bedtime  heparin   Injectable 5000 Unit(s) SubCutaneous every 12 hours  losartan 100 milliGRAM(s) Oral daily  metoprolol tartrate 50 milliGRAM(s) Oral two times a day  pantoprazole    Tablet 40 milliGRAM(s) Oral every 12 hours      HEALTH ISSUES - PROBLEM Dx:    s/p fall ,rt hip hematoma,rt hip contusion need rehab  anemia acute blood loss due to hematoma s/p 2 units prbc  chronic A fib off xarelto,case d/w grandson about pt condition,awaiting for AC committment  HTn on losartan  ckd#3 stable  early dementia will d/c rehab

## 2021-06-14 ENCOUNTER — TRANSCRIPTION ENCOUNTER (OUTPATIENT)
Age: 86
End: 2021-06-14

## 2021-06-14 LAB
BLD GP AB SCN SERPL QL: SIGNIFICANT CHANGE UP
HCT VFR BLD CALC: 23.9 % — LOW (ref 37–47)
HGB BLD-MCNC: 7.7 G/DL — LOW (ref 12–16)
MCHC RBC-ENTMCNC: 31.2 PG — HIGH (ref 27–31)
MCHC RBC-ENTMCNC: 32.2 G/DL — SIGNIFICANT CHANGE UP (ref 32–37)
MCV RBC AUTO: 96.8 FL — SIGNIFICANT CHANGE UP (ref 81–99)
NRBC # BLD: 0 /100 WBCS — SIGNIFICANT CHANGE UP (ref 0–0)
PLATELET # BLD AUTO: 201 K/UL — SIGNIFICANT CHANGE UP (ref 130–400)
RBC # BLD: 2.47 M/UL — LOW (ref 4.2–5.4)
RBC # FLD: 20.4 % — HIGH (ref 11.5–14.5)
SARS-COV-2 RNA SPEC QL NAA+PROBE: SIGNIFICANT CHANGE UP
WBC # BLD: 13.98 K/UL — HIGH (ref 4.8–10.8)
WBC # FLD AUTO: 13.98 K/UL — HIGH (ref 4.8–10.8)

## 2021-06-14 RX ORDER — POTASSIUM CHLORIDE 20 MEQ
20 PACKET (EA) ORAL ONCE
Refills: 0 | Status: COMPLETED | OUTPATIENT
Start: 2021-06-14 | End: 2021-06-14

## 2021-06-14 RX ORDER — ASPIRIN/CALCIUM CARB/MAGNESIUM 324 MG
1 TABLET ORAL
Qty: 0 | Refills: 0 | DISCHARGE
Start: 2021-06-14

## 2021-06-14 RX ORDER — ASPIRIN/CALCIUM CARB/MAGNESIUM 324 MG
81 TABLET ORAL DAILY
Refills: 0 | Status: DISCONTINUED | OUTPATIENT
Start: 2021-06-14 | End: 2021-06-14

## 2021-06-14 RX ORDER — HEPARIN SODIUM 5000 [USP'U]/ML
5000 INJECTION INTRAVENOUS; SUBCUTANEOUS
Qty: 0 | Refills: 0 | DISCHARGE
Start: 2021-06-14

## 2021-06-14 RX ORDER — ACETAMINOPHEN 500 MG
2 TABLET ORAL
Qty: 0 | Refills: 0 | DISCHARGE
Start: 2021-06-14

## 2021-06-14 RX ORDER — SENNA PLUS 8.6 MG/1
2 TABLET ORAL AT BEDTIME
Refills: 0 | Status: DISCONTINUED | OUTPATIENT
Start: 2021-06-14 | End: 2021-06-17

## 2021-06-14 RX ORDER — FERROUS SULFATE 325(65) MG
325 TABLET ORAL DAILY
Refills: 0 | Status: DISCONTINUED | OUTPATIENT
Start: 2021-06-14 | End: 2021-06-17

## 2021-06-14 RX ORDER — FOLIC ACID 0.8 MG
1 TABLET ORAL DAILY
Refills: 0 | Status: DISCONTINUED | OUTPATIENT
Start: 2021-06-14 | End: 2021-06-17

## 2021-06-14 RX ORDER — RIVAROXABAN 15 MG-20MG
0 KIT ORAL
Qty: 0 | Refills: 0 | DISCHARGE

## 2021-06-14 RX ORDER — FOLIC ACID 0.8 MG
1 TABLET ORAL
Qty: 0 | Refills: 0 | DISCHARGE
Start: 2021-06-14

## 2021-06-14 RX ORDER — FERROUS SULFATE 325(65) MG
1 TABLET ORAL
Qty: 0 | Refills: 0 | DISCHARGE
Start: 2021-06-14

## 2021-06-14 RX ORDER — SENNA PLUS 8.6 MG/1
2 TABLET ORAL
Qty: 0 | Refills: 0 | DISCHARGE
Start: 2021-06-14

## 2021-06-14 RX ORDER — PANTOPRAZOLE SODIUM 20 MG/1
1 TABLET, DELAYED RELEASE ORAL
Qty: 30 | Refills: 0
Start: 2021-06-14 | End: 2021-07-13

## 2021-06-14 RX ADMIN — PANTOPRAZOLE SODIUM 40 MILLIGRAM(S): 20 TABLET, DELAYED RELEASE ORAL at 05:04

## 2021-06-14 RX ADMIN — Medication 50 MILLIGRAM(S): at 18:25

## 2021-06-14 RX ADMIN — Medication 50 MILLIGRAM(S): at 05:04

## 2021-06-14 RX ADMIN — PANTOPRAZOLE SODIUM 40 MILLIGRAM(S): 20 TABLET, DELAYED RELEASE ORAL at 18:25

## 2021-06-14 RX ADMIN — Medication 650 MILLIGRAM(S): at 09:20

## 2021-06-14 RX ADMIN — ATORVASTATIN CALCIUM 80 MILLIGRAM(S): 80 TABLET, FILM COATED ORAL at 22:20

## 2021-06-14 RX ADMIN — Medication 325 MILLIGRAM(S): at 12:22

## 2021-06-14 RX ADMIN — Medication 81 MILLIGRAM(S): at 12:22

## 2021-06-14 RX ADMIN — Medication 650 MILLIGRAM(S): at 08:10

## 2021-06-14 RX ADMIN — HEPARIN SODIUM 5000 UNIT(S): 5000 INJECTION INTRAVENOUS; SUBCUTANEOUS at 05:04

## 2021-06-14 RX ADMIN — LOSARTAN POTASSIUM 100 MILLIGRAM(S): 100 TABLET, FILM COATED ORAL at 05:04

## 2021-06-14 RX ADMIN — SENNA PLUS 2 TABLET(S): 8.6 TABLET ORAL at 22:20

## 2021-06-14 RX ADMIN — Medication 20 MILLIEQUIVALENT(S): at 18:26

## 2021-06-14 RX ADMIN — Medication 1 MILLIGRAM(S): at 12:22

## 2021-06-14 NOTE — DISCHARGE NOTE PROVIDER - NSDCCPCAREPLAN_GEN_ALL_CORE_FT
PRINCIPAL DISCHARGE DIAGNOSIS  Diagnosis: Anemia  Assessment and Plan of Treatment: -you were admitted to medicine and treated  - you were also transfused with 2 units prbc  - Xarelto was held while inpatient  - please follow up with your PCP in 1 week for reassessment      SECONDARY DISCHARGE DIAGNOSES  Diagnosis: Hematoma of leg, right, initial encounter  Assessment and Plan of Treatment: - you were seen by PT who recommended rehab  - please continue physical therapy as tolerated     PRINCIPAL DISCHARGE DIAGNOSIS  Diagnosis: Anemia  Assessment and Plan of Treatment: -you were admitted to medicine and treated  - you were also transfused with 2 units prbc  - Xarelto was held while inpatient  - Xarelto to be held upon discharge due to risk of bleeding   - contnue aspirin and heparin subcutaneous as directed  - please follow up with your PCP and GI in 1 week for reassessment      SECONDARY DISCHARGE DIAGNOSES  Diagnosis: Hematoma of leg, right, initial encounter  Assessment and Plan of Treatment: - you were seen by PT who recommended rehab  - please continue physical therapy as tolerated    Diagnosis: Pneumonia  Assessment and Plan of Treatment: - you were treated with iv antibiotics and your symptoms improved  - please follow up with PCP for reassessment

## 2021-06-14 NOTE — DISCHARGE NOTE PROVIDER - HOSPITAL COURSE
to be completed by the Attending to be completed by the Attending  s/p fall rt hip hematoma,rt hip contusion  hx chronic rt infarct ,L1 fracture  chronic a fib not a candidate for AC ,only asa,heparin  anemia s/p 2 units prbc  ckd#3  s/p lt atectasis /pneumonia s/p cefepime     s/p fall rt hip hematoma,rt hip contusion  hx chronic rt infarct ,L1 fracture  chronic a fib not a candidate for AC ,only asa,heparin  anemia s/p 2 units prbc  ckd#3  s/p lt atectasis /pneumonia s/p cefepime

## 2021-06-14 NOTE — DISCHARGE NOTE PROVIDER - CARE PROVIDERS DIRECT ADDRESSES
[email or web address]

## 2021-06-14 NOTE — DISCHARGE NOTE PROVIDER - PROVIDER TOKENS
PROVIDER:[TOKEN:[[id number]],FOLLOWUP:[1 week]] PROVIDER:[TOKEN:[[id number]],FOLLOWUP:[1 week]],PROVIDER:[TOKEN:[[id number]],FOLLOWUP:[2 weeks]]

## 2021-06-14 NOTE — DISCHARGE NOTE PROVIDER - NSDCMRMEDTOKEN_GEN_ALL_CORE_FT
ALPRAZOLAM 0.25 MG TABLET: 1 tab(s) orally once a day, As Needed  EZETIMIBE 10 MG TABLET: TAKE 1 TABLET BY MOUTH EVERY DAY  LOSARTAN POTASSIUM 100 MG TAB: 1 each orally once a day   METOPROLOL TARTRATE 50 MG TAB: 1.5 tab(s) orally once a day  Protonix 40 mg oral delayed release tablet: 1 tab(s) orally once a day   ROSUVASTATIN CALCIUM 20 MG TAB: TAKE 1 TABLET BY MOUTH EVERY DAY  XARELTO 15 MG TABLET: TAKE 1 TABLET BY MOUTH EVERY DAY   acetaminophen 325 mg oral tablet: 2 tab(s) orally every 6 hours, As needed, Moderate Pain (4 - 6)  ALPRAZOLAM 0.25 MG TABLET: 1 tab(s) orally once a day, As Needed  aspirin 81 mg oral tablet, chewable: 1 tab(s) orally once a day  EZETIMIBE 10 MG TABLET: TAKE 1 TABLET BY MOUTH EVERY DAY  ferrous sulfate 325 mg (65 mg elemental iron) oral tablet: 1 tab(s) orally once a day  folic acid 1 mg oral tablet: 1 tab(s) orally once a day  LOSARTAN POTASSIUM 100 MG TAB: 1 each orally once a day   METOPROLOL TARTRATE 50 MG TAB: 1.5 tab(s) orally once a day  Protonix 40 mg oral delayed release tablet: 1 tab(s) orally once a day   ROSUVASTATIN CALCIUM 20 MG TAB: TAKE 1 TABLET BY MOUTH EVERY DAY  senna oral tablet: 2 tab(s) orally once a day (at bedtime)   acetaminophen 325 mg oral tablet: 2 tab(s) orally every 6 hours, As needed, Moderate Pain (4 - 6)  ALPRAZOLAM 0.25 MG TABLET: 1 tab(s) orally once a day, As Needed  aspirin 81 mg oral tablet, chewable: 1 tab(s) orally once a day  EZETIMIBE 10 MG TABLET: TAKE 1 TABLET BY MOUTH EVERY DAY  ferrous sulfate 325 mg (65 mg elemental iron) oral tablet: 1 tab(s) orally once a day  folic acid 1 mg oral tablet: 1 tab(s) orally once a day  heparin: 5000 unit(s) subcutaneous 2 times a day  LOSARTAN POTASSIUM 100 MG TAB: 1 each orally once a day   METOPROLOL TARTRATE 50 MG TAB: 1.5 tab(s) orally once a day  Protonix 40 mg oral delayed release tablet: 1 tab(s) orally once a day   ROSUVASTATIN CALCIUM 20 MG TAB: TAKE 1 TABLET BY MOUTH EVERY DAY  senna oral tablet: 2 tab(s) orally once a day (at bedtime)

## 2021-06-14 NOTE — DISCHARGE NOTE NURSING/CASE MANAGEMENT/SOCIAL WORK - PATIENT PORTAL LINK FT
You can access the FollowMyHealth Patient Portal offered by [facility] by registering at the following website: [email or web address]. By joining [facility]’s FollowMyHealth portal, you will also be able to view your health information using other applications (apps) compatible with our system.

## 2021-06-14 NOTE — CHART NOTE - NSCHARTNOTEFT_GEN_A_CORE
Was notified by RN about pt having black tarry stool this afternoon.   AM labs reviewed: H/H 9/28 down from H/H 10.9/35.  Vitals stable.  Case d/w Dr. Triana: will dc the discharge. Will check stool for occult blood and CBC at 1500 and AM.  Continue to monitor for any signs and symptoms.

## 2021-06-14 NOTE — PROGRESS NOTE ADULT - SUBJECTIVE AND OBJECTIVE BOX
RAVICECILLE  89y  Female      Patient is a 89y old  Female who presents with a chief complaint of anemia (13 Jun 2021 08:23)    s/p fall    REVIEW OF SYSTEMS:  CONSTITUTIONAL: No fever, weight loss, or fatigue  EYES: No eye pain, visual disturbances, or discharge  ENMT:  No difficulty hearing, tinnitus, vertigo; No sinus or throat pain  NECK: No pain or stiffness  BREASTS: No pain, masses, or nipple discharge  RESPIRATORY: No cough, wheezing, chills or hemoptysis; No shortness of breath  CARDIOVASCULAR: No chest pain, palpitations, dizziness, or leg swelling  GASTROINTESTINAL: No abdominal or epigastric pain. No nausea, vomiting, or hemetesis  GENITOURINARY: No dysuria, frequency, hematuria,   NEUROLOGICAL: No headaches, memory loss, +   LYMPH NODES: No enlarged glands  PSYCHIATRIC: No depression, anxiety, mood swings, or difficulty sleeping  HEME/LYMPH: No easy bruising, or bleeding gums  ALLERY AND IMMUNOLOGIC: No hives or eczema  FAMILY HISTORY:  No pertinent family history in first degree relatives      T(C): 35.4 (06-14-21 @ 05:37), Max: 35.9 (06-13-21 @ 13:30)  HR: 81 (06-14-21 @ 05:37) (73 - 81)  BP: 119/56 (06-14-21 @ 05:37) (119/56 - 132/69)  RR: 16 (06-14-21 @ 05:37) (16 - 16)  SpO2: --  Wt(kg): --Vital Signs Last 24 Hrs  T(C): 35.4 (14 Jun 2021 05:37), Max: 35.9 (13 Jun 2021 13:30)  T(F): 95.8 (14 Jun 2021 05:37), Max: 96.7 (13 Jun 2021 13:30)  HR: 81 (14 Jun 2021 05:37) (73 - 81)  BP: 119/56 (14 Jun 2021 05:37) (119/56 - 132/69)  BP(mean): --  RR: 16 (14 Jun 2021 05:37) (16 - 16)  SpO2: --  No Known Allergies      PHYSICAL EXAM:  GENERAL: NAD,   HEAD:  Atraumatic, Normocephalic  EYES: EOMI, PERRLA, conjunctiva and sclera clear  ENMT: No tonsillar erythema, exudates, or enlargement;   NECK: Supple, No JVD, Normal thyroid  NERVOUS SYSTEM:  Alert & Oriented   CHEST/LUNG: Clear to percussion bilaterally; No rales, rhonchi, wheezing, or rubs  HEART: Regular rate and rhythm; No murmurs, rubs, or gallops  ABDOMEN: Soft, Nontender, Nondistended; Bowel sounds present  EXTREMITIES:  , No clubbing, cyanosis, or edema  LYMPH: No lymphadenopathy noted  SKIN: No rashes or lesions      LABS:  06-13    144  |  111<H>  |  34<H>  ----------------------------<  105<H>  3.4<L>   |  17  |  1.4    Ca    8.3<L>      13 Jun 2021 07:27                          9.1    9.10  )-----------( 213      ( 13 Jun 2021 07:27 )             28.7           RADIOLOGY & ADDITIONAL TESTS:    MEDICATION:  acetaminophen   Tablet .. 650 milliGRAM(s) Oral every 6 hours PRN  aspirin  chewable 81 milliGRAM(s) Oral daily  atorvastatin 80 milliGRAM(s) Oral at bedtime  heparin   Injectable 5000 Unit(s) SubCutaneous every 12 hours  losartan 100 milliGRAM(s) Oral daily  metoprolol tartrate 50 milliGRAM(s) Oral two times a day  pantoprazole    Tablet 40 milliGRAM(s) Oral every 12 hours      HEALTH ISSUES - PROBLEM Dx:  s/p fall ,rt hip hematoma,rt hip contusion  anemia acute due to fall,rt hip hematoma,s/p 2 units PRBC  HTn on losartan  chronic a fib hx chronic rt infarct,l1 fracture ,high risk to fall with bleeding,case d/w son in law,grandson about pt condition anticoagulation case d/w Dr spencer as well ,finally not to place on anticoagulation only asa,heparin  ckd#3 stable  also explained to family about L1 fracture,chronic rt infarct,will d/c to rehab

## 2021-06-14 NOTE — DISCHARGE NOTE PROVIDER - CARE PROVIDER_API CALL
Patricia Umana  INTERNAL MEDICINE  [address] [place] [id number]  Phone: [phone]  Fax: [phone]  Follow Up Time: 1 week   Patricia Umana  INTERNAL MEDICINE  [address] [place] [id number]  Phone: [phone]  Fax: [phone]  Follow Up Time: 1 week    Chavo Jama  Gastroenterology  [address] [place] [address]  Phone: [phone]  Fax: [phone]  Follow Up Time: 2 weeks

## 2021-06-15 LAB
ANION GAP SERPL CALC-SCNC: 15 MMOL/L — HIGH (ref 7–14)
BUN SERPL-MCNC: 38 MG/DL — HIGH (ref 10–20)
CALCIUM SERPL-MCNC: 8.2 MG/DL — LOW (ref 8.5–10.1)
CHLORIDE SERPL-SCNC: 110 MMOL/L — SIGNIFICANT CHANGE UP (ref 98–110)
CO2 SERPL-SCNC: 18 MMOL/L — SIGNIFICANT CHANGE UP (ref 17–32)
CREAT SERPL-MCNC: 1.6 MG/DL — HIGH (ref 0.7–1.5)
GLUCOSE SERPL-MCNC: 100 MG/DL — HIGH (ref 70–99)
HCT VFR BLD CALC: 29.1 % — LOW (ref 37–47)
HGB BLD-MCNC: 9.2 G/DL — LOW (ref 12–16)
MCHC RBC-ENTMCNC: 31.1 PG — HIGH (ref 27–31)
MCHC RBC-ENTMCNC: 31.6 G/DL — LOW (ref 32–37)
MCV RBC AUTO: 98.3 FL — SIGNIFICANT CHANGE UP (ref 81–99)
NRBC # BLD: 0 /100 WBCS — SIGNIFICANT CHANGE UP (ref 0–0)
PLATELET # BLD AUTO: 230 K/UL — SIGNIFICANT CHANGE UP (ref 130–400)
POTASSIUM SERPL-MCNC: 3.6 MMOL/L — SIGNIFICANT CHANGE UP (ref 3.5–5)
POTASSIUM SERPL-SCNC: 3.6 MMOL/L — SIGNIFICANT CHANGE UP (ref 3.5–5)
RBC # BLD: 2.96 M/UL — LOW (ref 4.2–5.4)
RBC # FLD: 18.1 % — HIGH (ref 11.5–14.5)
SODIUM SERPL-SCNC: 143 MMOL/L — SIGNIFICANT CHANGE UP (ref 135–146)
WBC # BLD: 13.71 K/UL — HIGH (ref 4.8–10.8)
WBC # FLD AUTO: 13.71 K/UL — HIGH (ref 4.8–10.8)

## 2021-06-15 PROCEDURE — 99232 SBSQ HOSP IP/OBS MODERATE 35: CPT

## 2021-06-15 PROCEDURE — 71045 X-RAY EXAM CHEST 1 VIEW: CPT | Mod: 26

## 2021-06-15 RX ADMIN — SENNA PLUS 2 TABLET(S): 8.6 TABLET ORAL at 21:12

## 2021-06-15 RX ADMIN — PANTOPRAZOLE SODIUM 40 MILLIGRAM(S): 20 TABLET, DELAYED RELEASE ORAL at 17:28

## 2021-06-15 RX ADMIN — Medication 50 MILLIGRAM(S): at 05:41

## 2021-06-15 RX ADMIN — LOSARTAN POTASSIUM 100 MILLIGRAM(S): 100 TABLET, FILM COATED ORAL at 05:41

## 2021-06-15 RX ADMIN — Medication 1 MILLIGRAM(S): at 12:47

## 2021-06-15 RX ADMIN — Medication 325 MILLIGRAM(S): at 12:47

## 2021-06-15 RX ADMIN — Medication 50 MILLIGRAM(S): at 17:28

## 2021-06-15 RX ADMIN — ATORVASTATIN CALCIUM 80 MILLIGRAM(S): 80 TABLET, FILM COATED ORAL at 21:12

## 2021-06-15 RX ADMIN — PANTOPRAZOLE SODIUM 40 MILLIGRAM(S): 20 TABLET, DELAYED RELEASE ORAL at 05:41

## 2021-06-15 RX ADMIN — HEPARIN SODIUM 5000 UNIT(S): 5000 INJECTION INTRAVENOUS; SUBCUTANEOUS at 05:41

## 2021-06-15 NOTE — PROGRESS NOTE ADULT - ASSESSMENT
Impression: 89y year old  Female with dark stool  Patient is on iron, and it is possible that is what was seen  Currently there is brown stool in the rectal vault.  There does not appear to be active bleeding.  Hg corrected appropriately with transfusion.    Risks of endoscopy and colonoscopy outweigh benefits at this time.  Would follow cbc      Recommendation:    Follow CBC, transfuse PRN  po PPI           Impression: 89y year old  Female with dark stool  Patient is on iron, and it is possible that is what was seen  Currently there is brown stool in the rectal vault.  There does not appear to be active bleeding.  Hg corrected appropriately with transfusion.      Risks of endoscopy and colonoscopy outweigh benefits at this time.  Would follow cbc  I discussed her care with her Grandson, who is making medical decisions for her, and he does not want any procedure involving anaesthesia except in the most dire scenario, and even then, would want to discuss this seriously.    Recommendation:    Follow CBC, transfuse PRN  Would observe for 48 h  po PPI

## 2021-06-15 NOTE — PROGRESS NOTE ADULT - SUBJECTIVE AND OBJECTIVE BOX
Gastroenterology Consultation    89y year old  Female with "dark stool"    Patient is a 89y old  Female who presents with a chief complaint of anemia (15 Rubin 2021 08:09)      HPI:  Patient is a 89 year old female with HTN, GERD, a fib on Xarelto, CKD3, lumbar compression of L1 presents to ED with one day of confusion. Patient was admitted two months ago with upper GI bleed requiring transfusions. Xarelto was restarted upon discharge. Yesterday patient was found by her family confused with bruising over her abdomen. Patient does not recall any falls. Patient was brought to ED by EMS and now mentating back at baseline per granddaughter.    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 CTA R hip no extravasation.  Large hip hematoma caused previous drop in hemoglobin.  Patient also had a recent duodenal perforation presumably from ulcer disease.  Dark stool noted by nurse         (08 Jun 2021 18:05)      PAST MEDICAL & SURGICAL HISTORY:  Atrial fibrillation    GERD (gastroesophageal reflux disease)    HTN (hypertension)    No significant past surgical history        Allergies: No Known Allergies      Medications; acetaminophen   Tablet .. 650 milliGRAM(s) Oral every 6 hours PRN  atorvastatin 80 milliGRAM(s) Oral at bedtime  ferrous    sulfate 325 milliGRAM(s) Oral daily  folic acid 1 milliGRAM(s) Oral daily  heparin   Injectable 5000 Unit(s) SubCutaneous every 12 hours  losartan 100 milliGRAM(s) Oral daily  metoprolol tartrate 50 milliGRAM(s) Oral two times a day  pantoprazole    Tablet 40 milliGRAM(s) Oral every 12 hours  senna 2 Tablet(s) Oral at bedtime    Social History:  lives home alone  independent (08 Jun 2021 18:05)    FAMILY HISTORY:  No pertinent family history in first degree relatives      REVIEW OF SYSTEMS: pt is disoriented, unable to answer questions      GENERAL/Constitutional:  Appears stated age, well-groomed, well-nourished, no distress  Head Ears Nose Mouth throat::  NC/AT,  conjunctivae clear and pink, no thyromegaly, nodules, adenopathy, no JVD, sclera -anicteric  CHEST/Respiratory/:  Full & symmetric excursion, no increased effort, breath sounds clear  HEART/Cardiovascular:  Regular rhythm, S1, S2, no murmur/rub/S3/S4, no abdominal bruit, no edema  ABDOMEN/GI:  Soft, non-tender, non-distended, normoactive bowel sounds,  no masses ,no hepato-splenomegaly, no signs of chronic liver disease  EXTREMITIES/musculoskeletal::  no cyanosis,clubbing or edema or obvious fracutre  SKIN:  No rash/erythema/ecchymoses/petechiae/wounds/abscess/warm/dry  NEURO/psych:  confused, talking gibberish, no asterixis, no tremor,   rectal brown stool    Data:                        9.2    13.71 )-----------( 230      ( 15 Rubin 2021 07:42 )             29.1     06-15    143  |  110  |  38<H>  ----------------------------<  100<H>  3.6   |  18  |  1.6<H>    Ca    8.2<L>      15 Rubin 2021 07:42

## 2021-06-15 NOTE — PROGRESS NOTE ADULT - SUBJECTIVE AND OBJECTIVE BOX
[place]  89y  Female      Patient is a 89y old  Female who presents with a chief complaint of anemia (14 Jun 2021 08:37)        REVIEW OF SYSTEMS:  CONSTITUTIONAL: No fever, weight loss, or fatigue  EYES: No eye pain, visual disturbances, or discharge  ENMT:  No difficulty hearing, tinnitus, vertigo; No sinus or throat pain  NECK: No pain or stiffness  BREASTS: No pain, masses, or nipple discharge  RESPIRATORY: No cough, wheezing, chills or hemoptysis; No shortness of breath  CARDIOVASCULAR: No chest pain, palpitations, dizziness, or leg swelling  GASTROINTESTINAL: No abdominal or epigastric pain. No nausea, vomiting, black stool+  GENITOURINARY: No dysuria, frequency, hematuria, or incontinence  MUSCULOSKELETAL: No joint pain or swelling; No muscle, back, or extremity pain  PSYCHIATRIC: No depression, anxiety, mood swings, or difficulty sleeping  HEME/LYMPH: No easy bruising, or bleeding gums  ALLERY AND IMMUNOLOGIC: No hives or eczema  FAMILY HISTORY:  No pertinent family history in first degree relatives      T(C): 36.4 (06-15-21 @ 05:00), Max: 36.4 (06-15-21 @ 05:00)  HR: 91 (06-15-21 @ 05:00) (76 - 91)  BP: 142/63 (06-15-21 @ 05:00) (107/50 - 142/63)  RR: 16 (06-15-21 @ 05:00) (16 - 16)  SpO2: --  Wt(kg): --Vital Signs Last 24 Hrs  T(C): 36.4 (15 Rubin 2021 05:00), Max: 36.4 (15 Rubin 2021 05:00)  T(F): 97.6 (15 Rubin 2021 05:00), Max: 97.6 (15 Rubin 2021 05:00)  HR: 91 (15 Rubin 2021 05:00) (76 - 91)  BP: 142/63 (15 Rubin 2021 05:00) (107/50 - 142/63)  BP(mean): --  RR: 16 (15 Rubin 2021 05:00) (16 - 16)  SpO2: --  No Known Allergies      PHYSICAL EXAM:  GENERAL: NAD,   HEAD:  Atraumatic, Normocephalic  EYES: EOMI, PERRLA, conjunctiva and sclera clear  ENMT: No tonsillar erythema, exudates, or enlargement;   NECK: Supple, No JVD, Normal thyroid  NERVOUS SYSTEM:  Alert   CHEST/LUNG: Clear to percussion bilaterally; No rales, rhonchi, wheezing, or rubs  HEART: Regular rate and rhythm; No murmurs, rubs, or gallops  ABDOMEN: Soft, Nontender, Nondistended; Bowel sounds present  EXTREMITIES:   No clubbing, cyanosis, or edema  LYMPH: No lymphadenopathy noted  SKIN: No rashes or lesions      LABS:                          9.2    13.71 )-----------( 230      ( 15 Rubin 2021 07:42 )             29.1             RADIOLOGY & ADDITIONAL TESTS:    MEDICATION:  acetaminophen   Tablet .. 650 milliGRAM(s) Oral every 6 hours PRN  atorvastatin 80 milliGRAM(s) Oral at bedtime  ferrous    sulfate 325 milliGRAM(s) Oral daily  folic acid 1 milliGRAM(s) Oral daily  heparin   Injectable 5000 Unit(s) SubCutaneous every 12 hours  losartan 100 milliGRAM(s) Oral daily  metoprolol tartrate 50 milliGRAM(s) Oral two times a day  pantoprazole    Tablet 40 milliGRAM(s) Oral every 12 hours  senna 2 Tablet(s) Oral at bedtime      HEALTH ISSUES - PROBLEM Dx:  anemia due to acute blood loss ,dark stool s/p another 1 unit prbc,gi for EGD?,probably gi ,stool guaic positive in 4'21  S/p fall rt hip hematoma,s/p rt hip contusion need rehab  chronic a fib off xarelto ,case d/w family aware about that ,risk and benefit not to put on AC  HTn on losartan.  hx l1 fracture ,rt chronic anfarct ,case d/w family about that as well

## 2021-06-16 LAB
ANION GAP SERPL CALC-SCNC: 14 MMOL/L — SIGNIFICANT CHANGE UP (ref 7–14)
BUN SERPL-MCNC: 38 MG/DL — HIGH (ref 10–20)
CALCIUM SERPL-MCNC: 8.2 MG/DL — LOW (ref 8.5–10.1)
CHLORIDE SERPL-SCNC: 110 MMOL/L — SIGNIFICANT CHANGE UP (ref 98–110)
CO2 SERPL-SCNC: 21 MMOL/L — SIGNIFICANT CHANGE UP (ref 17–32)
CREAT SERPL-MCNC: 1.3 MG/DL — SIGNIFICANT CHANGE UP (ref 0.7–1.5)
GLUCOSE SERPL-MCNC: 100 MG/DL — HIGH (ref 70–99)
HCT VFR BLD CALC: 26.5 % — LOW (ref 37–47)
HCT VFR BLD CALC: 28.2 % — LOW (ref 37–47)
HGB BLD-MCNC: 8.4 G/DL — LOW (ref 12–16)
HGB BLD-MCNC: 8.9 G/DL — LOW (ref 12–16)
MCHC RBC-ENTMCNC: 30.7 PG — SIGNIFICANT CHANGE UP (ref 27–31)
MCHC RBC-ENTMCNC: 30.8 PG — SIGNIFICANT CHANGE UP (ref 27–31)
MCHC RBC-ENTMCNC: 31.6 G/DL — LOW (ref 32–37)
MCHC RBC-ENTMCNC: 31.7 G/DL — LOW (ref 32–37)
MCV RBC AUTO: 96.7 FL — SIGNIFICANT CHANGE UP (ref 81–99)
MCV RBC AUTO: 97.6 FL — SIGNIFICANT CHANGE UP (ref 81–99)
NRBC # BLD: 0 /100 WBCS — SIGNIFICANT CHANGE UP (ref 0–0)
NRBC # BLD: 0 /100 WBCS — SIGNIFICANT CHANGE UP (ref 0–0)
PLATELET # BLD AUTO: 205 K/UL — SIGNIFICANT CHANGE UP (ref 130–400)
PLATELET # BLD AUTO: 252 K/UL — SIGNIFICANT CHANGE UP (ref 130–400)
POTASSIUM SERPL-MCNC: 3.4 MMOL/L — LOW (ref 3.5–5)
POTASSIUM SERPL-SCNC: 3.4 MMOL/L — LOW (ref 3.5–5)
RBC # BLD: 2.74 M/UL — LOW (ref 4.2–5.4)
RBC # BLD: 2.89 M/UL — LOW (ref 4.2–5.4)
RBC # FLD: 19.4 % — HIGH (ref 11.5–14.5)
RBC # FLD: 19.9 % — HIGH (ref 11.5–14.5)
SODIUM SERPL-SCNC: 145 MMOL/L — SIGNIFICANT CHANGE UP (ref 135–146)
WBC # BLD: 11.6 K/UL — HIGH (ref 4.8–10.8)
WBC # BLD: 11.87 K/UL — HIGH (ref 4.8–10.8)
WBC # FLD AUTO: 11.6 K/UL — HIGH (ref 4.8–10.8)
WBC # FLD AUTO: 11.87 K/UL — HIGH (ref 4.8–10.8)

## 2021-06-16 RX ORDER — ATORVASTATIN CALCIUM 80 MG/1
40 TABLET, FILM COATED ORAL AT BEDTIME
Refills: 0 | Status: DISCONTINUED | OUTPATIENT
Start: 2021-06-16 | End: 2021-06-17

## 2021-06-16 RX ADMIN — Medication 1 MILLIGRAM(S): at 11:26

## 2021-06-16 RX ADMIN — HEPARIN SODIUM 5000 UNIT(S): 5000 INJECTION INTRAVENOUS; SUBCUTANEOUS at 05:27

## 2021-06-16 RX ADMIN — Medication 325 MILLIGRAM(S): at 11:26

## 2021-06-16 RX ADMIN — Medication 50 MILLIGRAM(S): at 17:45

## 2021-06-16 RX ADMIN — PANTOPRAZOLE SODIUM 40 MILLIGRAM(S): 20 TABLET, DELAYED RELEASE ORAL at 05:27

## 2021-06-16 RX ADMIN — HEPARIN SODIUM 5000 UNIT(S): 5000 INJECTION INTRAVENOUS; SUBCUTANEOUS at 17:08

## 2021-06-16 RX ADMIN — SENNA PLUS 2 TABLET(S): 8.6 TABLET ORAL at 21:16

## 2021-06-16 RX ADMIN — Medication 50 MILLIGRAM(S): at 05:27

## 2021-06-16 RX ADMIN — LOSARTAN POTASSIUM 100 MILLIGRAM(S): 100 TABLET, FILM COATED ORAL at 05:26

## 2021-06-16 RX ADMIN — PANTOPRAZOLE SODIUM 40 MILLIGRAM(S): 20 TABLET, DELAYED RELEASE ORAL at 17:45

## 2021-06-16 RX ADMIN — ATORVASTATIN CALCIUM 40 MILLIGRAM(S): 80 TABLET, FILM COATED ORAL at 21:16

## 2021-06-16 RX ADMIN — Medication 100 MILLIGRAM(S): at 17:44

## 2021-06-16 NOTE — DIETITIAN INITIAL EVALUATION ADULT. - REASON INDICATOR FOR ASSESSMENT
LOS; assessment overdue d/t large volume of patients.    Pt declines to speak with RD upon attempt at assessment and says she would rather sleep than talk about this. Nutrition hx deferred for this reason.

## 2021-06-16 NOTE — DIETITIAN INITIAL EVALUATION ADULT. - SIGNS/SYMPTOMS
widely variable po intake (0-100%) throughout LOS; sleeping thru breakfast (6/16) per RD observation

## 2021-06-16 NOTE — DIETITIAN INITIAL EVALUATION ADULT. - OTHER CALCULATIONS
wt used: dosing 54.4 kg; Kcal: 1166- 1264 kcal/d (MSJ x 1.2-1.3 AF); Protein: 54-65 g (1-1.2 g/kg); Fluid: 1 mL/kcal or per LIP.

## 2021-06-16 NOTE — DIETITIAN INITIAL EVALUATION ADULT. - ADD RECOMMEND
continue current diet order as tolerated, encourage po intake, provide assistance with meals PRN. If po intake consistently <50% upon f/u, will consider the need to recommend oral nutrition supplements.

## 2021-06-16 NOTE — DIETITIAN INITIAL EVALUATION ADULT. - FACTORS AFF FOOD INTAKE
Pt with untouched breakfast tray in front of her @ time of visit. She states that she would rather sleep. Po intake is widely variable per EMR-- 0-100% throughout LOS. Last BM on 6/14 per EMR.

## 2021-06-16 NOTE — DIETITIAN INITIAL EVALUATION ADULT. - OTHER INFO
Pt admitted d/t fever, hydronephrosis, pneumonia, anemia, hematoma (R leg). GI following r/t anemia/dark stool-- per assessment on 6/15 "brown stool in rectal vault; there does not appear to be active bleeding"; risk of EGD/colonoscopy outweighs the benefits @ this time.

## 2021-06-16 NOTE — DIETITIAN INITIAL EVALUATION ADULT. - PHYSCIAL ASSESSMENT
unable to obtain wt hx as mentioned above    (6/12) +1 edema (L/R legs); skin assessment (6/15) shows ecchymosis. well nourished

## 2021-06-16 NOTE — PROGRESS NOTE ADULT - SUBJECTIVE AND OBJECTIVE BOX
[place]  89y  Female      Patient is a 89y old  Female who presents with a chief complaint of anemia (15 Rubin 2021 11:16)        REVIEW OF SYSTEMS:  CONSTITUTIONAL: No fever, weight loss, or fatigue  EYES: No eye pain, visual disturbances, or discharge  ENMT:  No difficulty hearing, tinnitus, vertigo; No sinus or throat pain  NECK: No pain or stiffness  BREASTS: No pain, masses, or nipple discharge  RESPIRATORY: No cough, wheezing, chills or hemoptysis; No shortness of breath  CARDIOVASCULAR: No chest pain, palpitations, dizziness, or leg swelling  GASTROINTESTINAL: No abdominal or epigastric pain. No nausea, vomiting, or hematemesis  GENITOURINARY: No dysuria, frequency, hematuria, or incontinence  MUSCULOSKELETAL: No joint pain or swelling; No muscle, back, or extremity pain  PSYCHIATRIC: No depression, anxiety, mood swings, or difficulty sleeping  HEME/LYMPH: No easy bruising, or bleeding gums  ALLERY AND IMMUNOLOGIC: No hives or eczema  FAMILY HISTORY:  No pertinent family history in first degree relatives      T(C): 36.1 (06-16-21 @ 05:25), Max: 36.2 (06-15-21 @ 14:00)  HR: 82 (06-16-21 @ 05:25) (73 - 82)  BP: 157/68 (06-16-21 @ 05:25) (123/57 - 157/68)  RR: 16 (06-16-21 @ 05:25) (16 - 16)  SpO2: --  Wt(kg): --Vital Signs Last 24 Hrs  T(C): 36.1 (16 Jun 2021 05:25), Max: 36.2 (15 Rubin 2021 14:00)  T(F): 96.9 (16 Jun 2021 05:25), Max: 97.2 (15 Rubin 2021 14:00)  HR: 82 (16 Jun 2021 05:25) (73 - 82)  BP: 157/68 (16 Jun 2021 05:25) (123/57 - 157/68)  BP(mean): --  RR: 16 (16 Jun 2021 05:25) (16 - 16)  SpO2: --  No Known Allergies      PHYSICAL EXAM:  GENERAL: NAD,   HEAD:  Atraumatic, Normocephalic  EYES: EOMI, PERRLA, conjunctiva and sclera clear  ENMT: No tonsillar erythema, exudates, or enlargement;   NECK: Supple, No JVD, Normal thyroid  NERVOUS SYSTEM:  Alert   CHEST/LUNGs vbs slight decreased breath sound  HEART: Regular rate and rhythm; No murmurs, rubs, or gallops  ABDOMEN: Soft, Nontender, Nondistended; Bowel sounds present  EXTREMITIES:  , No clubbing, cyanosis, or edema  LYMPH: No lymphadenopathy noted  SKIN: No rashes or lesions      LABS:  06-15    143  |  110  |  38<H>  ----------------------------<  100<H>  3.6   |  18  |  1.6<H>    Ca    8.2<L>      15 Rubin 2021 07:42                          8.4    11.60 )-----------( 205      ( 16 Jun 2021 06:51 )             26.5   h< from: Xray Chest 1 View- PORTABLE-Urgent (Xray Chest 1 View- PORTABLE-Urgent .) (06.15.21 @ 09:24) >  Possible left basilar opacity, difficult to assess on the portable AP technique. Consider PA and lateral views.    < end of copied text >          RADIOLOGY & ADDITIONAL TESTS:    MEDICATION:  acetaminophen   Tablet .. 650 milliGRAM(s) Oral every 6 hours PRN  atorvastatin 40 milliGRAM(s) Oral at bedtime  doxycycline hyclate Capsule 100 milliGRAM(s) Oral every 12 hours  ferrous    sulfate 325 milliGRAM(s) Oral daily  folic acid 1 milliGRAM(s) Oral daily  heparin   Injectable 5000 Unit(s) SubCutaneous every 12 hours  losartan 100 milliGRAM(s) Oral daily  metoprolol tartrate 50 milliGRAM(s) Oral two times a day  pantoprazole    Tablet 40 milliGRAM(s) Oral every 12 hours  senna 2 Tablet(s) Oral at bedtime      HEALTH ISSUES - PROBLEM Dx:  anemia acute blood loss s/p 3 units prbc  HTN on losartan  Chronic a fib off xarelto,case d/w grandson agree not to put on AC  lt pneumonia vs atelectasis with cough add on doxycycline,s/p cefepime  gerd hx perforation on protonix  s/p fall rt hip contusion,hematoma,hx l1 fx need pt/ot

## 2021-06-17 VITALS — HEART RATE: 77 BPM | SYSTOLIC BLOOD PRESSURE: 133 MMHG | DIASTOLIC BLOOD PRESSURE: 61 MMHG

## 2021-06-17 DIAGNOSIS — Z63.4 DISAPPEARANCE AND DEATH OF FAMILY MEMBER: ICD-10-CM

## 2021-06-17 DIAGNOSIS — Z87.19 PERSONAL HISTORY OF OTHER DISEASES OF THE DIGESTIVE SYSTEM: ICD-10-CM

## 2021-06-17 LAB
ANION GAP SERPL CALC-SCNC: 10 MMOL/L — SIGNIFICANT CHANGE UP (ref 7–14)
BUN SERPL-MCNC: 33 MG/DL — HIGH (ref 10–20)
CALCIUM SERPL-MCNC: 8.5 MG/DL — SIGNIFICANT CHANGE UP (ref 8.5–10.1)
CHLORIDE SERPL-SCNC: 111 MMOL/L — HIGH (ref 98–110)
CO2 SERPL-SCNC: 20 MMOL/L — SIGNIFICANT CHANGE UP (ref 17–32)
CREAT SERPL-MCNC: 1.2 MG/DL — SIGNIFICANT CHANGE UP (ref 0.7–1.5)
GLUCOSE SERPL-MCNC: 84 MG/DL — SIGNIFICANT CHANGE UP (ref 70–99)
HCT VFR BLD CALC: 29.4 % — LOW (ref 37–47)
HGB BLD-MCNC: 9.1 G/DL — LOW (ref 12–16)
MCHC RBC-ENTMCNC: 31 G/DL — LOW (ref 32–37)
MCHC RBC-ENTMCNC: 31 PG — SIGNIFICANT CHANGE UP (ref 27–31)
MCV RBC AUTO: 100 FL — HIGH (ref 81–99)
NRBC # BLD: 0 /100 WBCS — SIGNIFICANT CHANGE UP (ref 0–0)
PLATELET # BLD AUTO: 231 K/UL — SIGNIFICANT CHANGE UP (ref 130–400)
POTASSIUM SERPL-MCNC: 3.9 MMOL/L — SIGNIFICANT CHANGE UP (ref 3.5–5)
POTASSIUM SERPL-SCNC: 3.9 MMOL/L — SIGNIFICANT CHANGE UP (ref 3.5–5)
RAPID RVP RESULT: SIGNIFICANT CHANGE UP
RBC # BLD: 2.94 M/UL — LOW (ref 4.2–5.4)
RBC # FLD: 20.1 % — HIGH (ref 11.5–14.5)
SARS-COV-2 RNA SPEC QL NAA+PROBE: SIGNIFICANT CHANGE UP
SODIUM SERPL-SCNC: 141 MMOL/L — SIGNIFICANT CHANGE UP (ref 135–146)
WBC # BLD: 10.23 K/UL — SIGNIFICANT CHANGE UP (ref 4.8–10.8)
WBC # FLD AUTO: 10.23 K/UL — SIGNIFICANT CHANGE UP (ref 4.8–10.8)

## 2021-06-17 RX ADMIN — Medication 1 MILLIGRAM(S): at 14:27

## 2021-06-17 RX ADMIN — PANTOPRAZOLE SODIUM 40 MILLIGRAM(S): 20 TABLET, DELAYED RELEASE ORAL at 05:00

## 2021-06-17 RX ADMIN — LOSARTAN POTASSIUM 100 MILLIGRAM(S): 100 TABLET, FILM COATED ORAL at 05:00

## 2021-06-17 RX ADMIN — HEPARIN SODIUM 5000 UNIT(S): 5000 INJECTION INTRAVENOUS; SUBCUTANEOUS at 05:00

## 2021-06-17 RX ADMIN — Medication 100 MILLIGRAM(S): at 05:00

## 2021-06-17 RX ADMIN — Medication 50 MILLIGRAM(S): at 05:00

## 2021-06-17 RX ADMIN — Medication 325 MILLIGRAM(S): at 14:27

## 2021-06-17 SDOH — SOCIAL STABILITY - SOCIAL INSECURITY: DISSAPEARANCE AND DEATH OF FAMILY MEMBER: Z63.4

## 2021-06-17 NOTE — PROGRESS NOTE ADULT - SUBJECTIVE AND OBJECTIVE BOX
Chart reviewed, patient examined. Pertinent results reviewed.  reviewed with the PA; specialist f/u reviewed  HD#9    Patient is a 89y old  Female who presented with a chief complaint of anemia (15 Rubin 2021 11:16), weakness , fall and confusion.  She was found to have a R hip contusion w hematoma, and received some transfusions. She is stable, 7 less confused now.        REVIEW OF SYSTEMS:  CONSTITUTIONAL: No fever, weight loss; + general weakness- improving  EYES: No eye pain, visual disturbances, or discharge  ENMT:  No difficulty hearing, tinnitus, vertigo; No sinus or throat pain  NECK: No pain or stiffness  RESPIRATORY: No cough, wheezing, chills or hemoptysis; No shortness of breath  CARDIOVASCULAR: No chest pain, palpitations, dizziness, or leg swelling  GASTROINTESTINAL: No abdominal or epigastric pain. No nausea, vomiting, or hematemesis; s/p recent admit for UGI Bleed  GENITOURINARY: No dysuria, frequency, hematuria, or incontinence  MUSCULOSKELETAL:  + R hip pain/ swelling  PSYCHIATRIC: No depression, anxiety, mood swings, or difficulty sleeping  HEME/LYMPH: No easy bruising, or bleeding gums  ALLERY AND IMMUNOLOGIC: No hives or eczema  FAMILY HISTORY:  No pertinent family history in first degree relatives    No Known Allergies    Vital Signs Last 24 Hrs  T(C): 36.3 (17 Jun 2021 06:10), Max: 36.3 (17 Jun 2021 06:10)  T(F): 97.3 (17 Jun 2021 06:10), Max: 97.3 (17 Jun 2021 06:10)  HR: 76 (17 Jun 2021 06:10) (68 - 76)  BP: 185/75 (17 Jun 2021 06:10) (122/60 - 185/75)  BP(mean): --  RR: 16 (17 Jun 2021 06:10) (16 - 16)  SpO2: --    PHYSICAL EXAM:  GENERAL: NAD,   HEAD:  Atraumatic, Normocephalic  EYES: EOMI, PERRLA, conjunctiva and sclera clear  ENMT: No tonsillar erythema, exudates, or enlargement;   NECK: Supple, No JVD, Normal thyroid  NERVOUS SYSTEM:  Alert ; 0x2  CHEST/LUNGs slight decreased breath sound  HEART: IRR;IRR;  No murmurs, rubs, or gallops  ABDOMEN: Soft, Nontender, Nondistended; Bowel sounds present  EXTREMITIES:  , No clubbing, cyanosis, or edema; R hip Hematoma  LYMPH: No lymphadenopathy noted  SKIN: No rashes or lesions      LABS:  06-15    143  |  110  |  38<H>  ----------------------------<  100<H>  3.6   |  18  |  1.6<H>    Ca    8.2<L>      15 Rubin 2021 07:42                          8.4    11.60 )-----------( 205      ( 16 Jun 2021 06:51 )             26.5   h< from: Xray Chest 1 View- PORTABLE-Urgent (Xray Chest 1 View- PORTABLE-Urgent .) (06.15.21 @ 09:24) >  Possible left basilar opacity, difficult to assess on the portable AP technique. Consider PA and lateral views.    < end of copied text >          RADIOLOGY & ADDITIONAL TESTS:    MEDICATION:  acetaminophen   Tablet .. 650 milliGRAM(s) Oral every 6 hours PRN  atorvastatin 40 milliGRAM(s) Oral at bedtime  doxycycline hyclate Capsule 100 milliGRAM(s) Oral every 12 hours  ferrous    sulfate 325 milliGRAM(s) Oral daily  folic acid 1 milliGRAM(s) Oral daily  heparin   Injectable 5000 Unit(s) SubCutaneous every 12 hours  losartan 100 milliGRAM(s) Oral daily  metoprolol tartrate 50 milliGRAM(s) Oral two times a day  pantoprazole    Tablet 40 milliGRAM(s) Oral every 12 hours  senna 2 Tablet(s) Oral at bedtime      HEALTH ISSUES - PROBLEM Dx:  anemia acute blood loss s/p 3 units prbc; now stable; needs f/u monitoring  HTN on losartan  Chronic a fib off xarelto,case d/w grandson agree not to put on AC  lt pneumonia vs atelectasis with cough add on doxycycline,s/p cefepime  gerd hx perforation on protonix  s/p fall rt hip contusion,hematoma,hx l1 fx need pt/ot  Deconditioning from all- for [facility] ; [place]; rev'd w CM, and son-in -law fabian

## 2021-06-17 NOTE — PROGRESS NOTE ADULT - PROVIDER SPECIALTY LIST ADULT
Internal Medicine
Gastroenterology
Internal Medicine
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
## 2021-06-24 DIAGNOSIS — G93.41 METABOLIC ENCEPHALOPATHY: ICD-10-CM

## 2021-06-24 DIAGNOSIS — N17.9 ACUTE KIDNEY FAILURE, UNSPECIFIED: ICD-10-CM

## 2021-06-24 DIAGNOSIS — S70.11XA CONTUSION OF RIGHT THIGH, INITIAL ENCOUNTER: ICD-10-CM

## 2021-06-24 DIAGNOSIS — T45.515A ADVERSE EFFECT OF ANTICOAGULANTS, INITIAL ENCOUNTER: ICD-10-CM

## 2021-06-24 DIAGNOSIS — G89.29 OTHER CHRONIC PAIN: ICD-10-CM

## 2021-06-24 DIAGNOSIS — R27.0 ATAXIA, UNSPECIFIED: ICD-10-CM

## 2021-06-24 DIAGNOSIS — F03.90 UNSPECIFIED DEMENTIA WITHOUT BEHAVIORAL DISTURBANCE: ICD-10-CM
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
## 2021-06-24 DIAGNOSIS — K21.9 GASTRO-ESOPHAGEAL REFLUX DISEASE WITHOUT ESOPHAGITIS: ICD-10-CM
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
## 2021-06-24 DIAGNOSIS — M48.56XA COLLAPSED VERTEBRA, NOT ELSEWHERE CLASSIFIED, LUMBAR REGION, INITIAL ENCOUNTER FOR FRACTURE: ICD-10-CM

## 2021-06-24 DIAGNOSIS — I48.20 CHRONIC ATRIAL FIBRILLATION, UNSPECIFIED: ICD-10-CM

## 2021-06-24 DIAGNOSIS — E87.0 HYPEROSMOLALITY AND HYPERNATREMIA: ICD-10-CM

## 2021-06-24 DIAGNOSIS — J18.9 PNEUMONIA, UNSPECIFIED ORGANISM: ICD-10-CM

## 2021-06-24 DIAGNOSIS — D62 ACUTE POSTHEMORRHAGIC ANEMIA: ICD-10-CM

## 2021-06-24 DIAGNOSIS — Z86.73 PERSONAL HISTORY OF TRANSIENT ISCHEMIC ATTACK (TIA), AND CEREBRAL INFARCTION WITHOUT RESIDUAL DEFICITS: ICD-10-CM

## 2021-06-24 DIAGNOSIS — Z79.01 LONG TERM (CURRENT) USE OF ANTICOAGULANTS: ICD-10-CM

## 2021-06-24 DIAGNOSIS — N18.30 CHRONIC KIDNEY DISEASE, STAGE 3 UNSPECIFIED: ICD-10-CM

## 2021-06-24 DIAGNOSIS — J98.11 ATELECTASIS: ICD-10-CM

## 2021-06-24 DIAGNOSIS — A41.9 SEPSIS, UNSPECIFIED ORGANISM: ICD-10-CM

## 2021-06-24 DIAGNOSIS — D63.1 ANEMIA IN CHRONIC KIDNEY DISEASE: ICD-10-CM

## 2021-06-24 DIAGNOSIS — S70.01XA CONTUSION OF RIGHT HIP, INITIAL ENCOUNTER: ICD-10-CM

## 2021-06-24 DIAGNOSIS — R50.9 FEVER, UNSPECIFIED: ICD-10-CM

## 2021-06-24 DIAGNOSIS — I12.9 HYPERTENSIVE CHRONIC KIDNEY DISEASE WITH STAGE 1 THROUGH STAGE 4 CHRONIC KIDNEY DISEASE, OR UNSPECIFIED CHRONIC KIDNEY DISEASE: ICD-10-CM

## 2021-06-24 DIAGNOSIS — M54.9 DORSALGIA, UNSPECIFIED: ICD-10-CM

## 2021-07-11 PROBLEM — E87.5 HYPERKALEMIA: Status: ACTIVE | Noted: 2021-07-11

## 2021-07-11 PROBLEM — I25.2 PAST MYOCARDIAL INFARCTION: Status: ACTIVE | Noted: 2021-07-11

## 2021-07-11 PROBLEM — E78.5 HYPERLIPIDEMIA: Status: ACTIVE | Noted: 2021-07-11

## 2021-07-11 PROBLEM — I25.10 ARTERIOSCLEROTIC CORONARY ARTERY DISEASE: Status: ACTIVE | Noted: 2021-07-11

## 2021-07-11 PROBLEM — D64.9 ANEMIA: Status: ACTIVE | Noted: 2021-07-11

## 2021-07-11 PROBLEM — I10 HYPERTENSION: Status: ACTIVE | Noted: 2021-07-11

## 2021-07-11 PROBLEM — R80.9 PROTEINURIA: Status: RESOLVED | Noted: 2021-06-17 | Resolved: 2021-07-11

## 2021-07-11 PROBLEM — I48.91 ATRIAL FIBRILLATION: Status: ACTIVE | Noted: 2021-07-11

## 2021-07-11 NOTE — REVIEW OF SYSTEMS
[Fever] : no fever [Blurry Vision] : no blurred vision [Hearing Loss] : hearing loss [SOB] : no shortness of breath [Chest Discomfort] : no chest discomfort [Syncope] : no syncope [Cough] : no cough [Wheezing] : no wheezing [Abdominal Pain] : no abdominal pain [Diarrhea] : diarrhea [Constipation] : no constipation [Dysuria] : no dysuria [Joint Pain] : no joint pain [Myalgia] : no myalgia [Rash] : no rash [Skin Lesions] : no skin lesions [Dizziness] : no dizziness [Weakness] : no weakness [Confusion] : no confusion was observed [Easy Bleeding] : no tendency for easy bleeding

## 2021-07-11 NOTE — DISCUSSION/SUMMARY
[FreeTextEntry1] : Pt with renal insufficiency. Tolerating xarelto 15mg. PT does not want cardioversion now .Off amiodarone did not work. Will continue meds for now. Takes occasional xanax and it helps. At times anxious. Continue exercise.Palpitations better. She lost 3 lbs. Daughter  3/2016. She had echo  MOD AI MOD MR. 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 She  had GI bleed. She had ulcer. She was taking advil. Now no advil. Back on xarelto . Told walk.*** Pt admitted hosp disch 21 dx anemia, PNA, transfused 2 Units PC. now off xarelto

## 2021-07-12 ENCOUNTER — APPOINTMENT (OUTPATIENT)
Dept: CARDIOLOGY | Facility: CLINIC | Age: 86
End: 2021-07-12

## 2021-07-12 DIAGNOSIS — E78.5 HYPERLIPIDEMIA, UNSPECIFIED: ICD-10-CM

## 2021-07-12 DIAGNOSIS — I10 ESSENTIAL (PRIMARY) HYPERTENSION: ICD-10-CM

## 2021-07-12 DIAGNOSIS — I25.2 OLD MYOCARDIAL INFARCTION: ICD-10-CM

## 2021-07-12 DIAGNOSIS — R80.9 PROTEINURIA, UNSPECIFIED: ICD-10-CM

## 2021-07-12 DIAGNOSIS — E87.5 HYPERKALEMIA: ICD-10-CM

## 2021-07-12 DIAGNOSIS — I25.10 ATHEROSCLEROTIC HEART DISEASE OF NATIVE CORONARY ARTERY W/OUT ANGINA PECTORIS: ICD-10-CM

## 2021-07-12 DIAGNOSIS — I48.91 UNSPECIFIED ATRIAL FIBRILLATION: ICD-10-CM

## 2021-07-12 DIAGNOSIS — D64.9 ANEMIA, UNSPECIFIED: ICD-10-CM

## 2021-10-07 ENCOUNTER — APPOINTMENT (OUTPATIENT)
Dept: GASTROENTEROLOGY | Facility: CLINIC | Age: 86
End: 2021-10-07

## 2021-11-01 NOTE — PATIENT PROFILE ADULT - NSASFALLNEEDSASSISTWITH_GEN_A_NUR
PAULA BLANCO  83y  MRN: [id number]    Subjective:    Patient is a 83y old  Female who presents with a chief complaint of Referred by [facility] for evaluation of "ESRD" (29 Oct 2021 20:54)      Interval history/overnight events:          MEDICATIONS  (STANDING):  amLODIPine   Tablet 5 milliGRAM(s) Oral daily  atorvastatin 10 milliGRAM(s) Oral at bedtime  brimonidine 0.2% Ophthalmic Solution 1 Drop(s) Right EYE two times a day  carvedilol 25 milliGRAM(s) Oral every 12 hours  dextrose 40% Gel 15 Gram(s) Oral once  dextrose 5%. 1000 milliLiter(s) (50 mL/Hr) IV Continuous <Continuous>  dextrose 5%. 1000 milliLiter(s) (100 mL/Hr) IV Continuous <Continuous>  dextrose 50% Injectable 25 Gram(s) IV Push once  dextrose 50% Injectable 12.5 Gram(s) IV Push once  dextrose 50% Injectable 25 Gram(s) IV Push once  glucagon  Injectable 1 milliGRAM(s) IntraMuscular once  heparin   Injectable 5000 Unit(s) SubCutaneous every 12 hours  influenza  Vaccine (HIGH DOSE) 0.7 milliLiter(s) IntraMuscular once  insulin lispro (ADMELOG) corrective regimen sliding scale   SubCutaneous three times a day before meals  insulin lispro (ADMELOG) corrective regimen sliding scale   SubCutaneous at bedtime  sodium bicarbonate 325 milliGRAM(s) Oral three times a day  sodium chloride 0.9% lock flush 3 milliLiter(s) IV Push every 8 hours  sodium chloride 0.9%. 1000 milliLiter(s) (75 mL/Hr) IV Continuous <Continuous>    MEDICATIONS  (PRN):        Objective:    Vitals: Vital Signs Last 24 Hrs  T(C): 36.7 (10-30-21 @ 05:50), Max: 36.7 (10-29-21 @ 21:50)  T(F): 98 (10-30-21 @ 05:50), Max: 98 (10-29-21 @ 21:50)  HR: 55 (10-30-21 @ 05:50) (55 - 61)  BP: 143/69 (10-30-21 @ 05:50) (116/74 - 152/74)  BP(mean): --  RR: 17 (10-30-21 @ 05:50) (16 - 17)  SpO2: 98% (10-30-21 @ 05:50) (94% - 100%)            I&O's Summary      PHYSICAL EXAM:  GENERAL: NAD, lying in bed  HEENT: PERRLA, EOM intact, tongue without obvious lesion but scar from biopsy on right side, no other oral lesions appreciated, no LAD  CHEST/LUNG: CTAB, no wheezing, crackles, or ronchi   HEART: RRR, no murmur appreciated  ABDOMEN: soft, nondistended, non-tender, normoactive BS  SKIN: No rashes or lesions  NERVOUS SYSTEM: Alert & Oriented X2-3 (name, location was hospital, year with prompting)  EXT: no peripheral edema  PSYCH: calm and cooperative     LABS:    10-29    134<L>  |  100  |  88<H>  ----------------------------<  103<H>  4.2   |  19<L>  |  3.45<H>    Ca    9.1      29 Oct 2021 19:06  Phos  5.0     10-29  Mg     2.20     10-29    TPro  6.5  /  Alb  3.5  /  TBili  <0.2  /  DBili  x   /  AST  7   /  ALT  7   /  AlkPhos  73  10-29                  Urinalysis Basic - ( 29 Oct 2021 23:53 )    Color: Light Yellow / Appearance: Slightly Turbid / S.010 / pH: x  Gluc: x / Ketone: Negative  / Bili: Negative / Urobili: <2 mg/dL   Blood: x / Protein: Trace / Nitrite: Negative   Leuk Esterase: Moderate / RBC: 1 /HPF / WBC 31 /HPF   Sq Epi: x / Non Sq Epi: 8 /HPF / Bacteria: Occasional                              9.0    6.72  )-----------( 344      ( 29 Oct 2021 19:06 )             26.8                         9.5    7.73  )-----------( 322      ( 28 Oct 2021 15:27 )             28.9     CAPILLARY BLOOD GLUCOSE      POCT Blood Glucose.: 92 mg/dL (29 Oct 2021 23:54)          RADIOLOGY & ADDITIONAL TESTS:            Imaging Personally Reviewed:  [ ] YES  [ ] NO    Consultants involved in case:   Consultant(s) Notes Reviewed:  [ ] YES  [ ] NO:   Care Discussed with Consultants/Other Providers [ ] YES  [ ] NO         PAULA BLANCO  83y  MRN: [id number]    Subjective:    Patient is a 83y old  Female who presents with a chief complaint of Referred by [facility] for evaluation of "ESRD" (29 Oct 2021 20:54)      Interval history/overnight events:  The patient feels uncomfortable in the hospital and would like to go home. When asked about burning sensation with swallowing, she reports having ear pain, pointing to inside of right ear. She continues to have poor PO intake, limited by pain and says she doesn't like the food. Discussed utility of PEG tube with patient, she is not ready at this time. She had 2x BMs yesterday.      MEDICATIONS  (STANDING):  amLODIPine   Tablet 5 milliGRAM(s) Oral daily  atorvastatin 10 milliGRAM(s) Oral at bedtime  brimonidine 0.2% Ophthalmic Solution 1 Drop(s) Right EYE two times a day  carvedilol 25 milliGRAM(s) Oral every 12 hours  dextrose 40% Gel 15 Gram(s) Oral once  dextrose 5%. 1000 milliLiter(s) (50 mL/Hr) IV Continuous <Continuous>  dextrose 5%. 1000 milliLiter(s) (100 mL/Hr) IV Continuous <Continuous>  dextrose 50% Injectable 25 Gram(s) IV Push once  dextrose 50% Injectable 12.5 Gram(s) IV Push once  dextrose 50% Injectable 25 Gram(s) IV Push once  glucagon  Injectable 1 milliGRAM(s) IntraMuscular once  heparin   Injectable 5000 Unit(s) SubCutaneous every 12 hours  influenza  Vaccine (HIGH DOSE) 0.7 milliLiter(s) IntraMuscular once  insulin lispro (ADMELOG) corrective regimen sliding scale   SubCutaneous three times a day before meals  insulin lispro (ADMELOG) corrective regimen sliding scale   SubCutaneous at bedtime  sodium bicarbonate 325 milliGRAM(s) Oral three times a day  sodium chloride 0.9% lock flush 3 milliLiter(s) IV Push every 8 hours  sodium chloride 0.9%. 1000 milliLiter(s) (75 mL/Hr) IV Continuous <Continuous>    MEDICATIONS  (PRN):        Objective:    Vitals: Vital Signs Last 24 Hrs  T(C): 36.7 (10-30-21 @ 05:50), Max: 36.7 (10-29-21 @ 21:50)  T(F): 98 (10-30-21 @ 05:50), Max: 98 (10-29-21 @ 21:50)  HR: 55 (10-30-21 @ 05:50) (55 - 61)  BP: 143/69 (10-30-21 @ 05:50) (116/74 - 152/74)  BP(mean): --  RR: 17 (10-30-21 @ 05:50) (16 - 17)  SpO2: 98% (10-30-21 @ 05:50) (94% - 100%)            I&O's Summary      PHYSICAL EXAM:  GENERAL: NAD, lying in bed  HEENT: No obvious ear lesion, no drainage or erythema. PERRLA, EOM intact, tongue scar from biopsy on right side, no other oral lesions appreciated, no LAD  CHEST/LUNG: CTAB, no wheezing, crackles, or ronchi   HEART: Bradycardia, no murmur appreciated  ABDOMEN: soft, nondistended, non-tender, normoactive BS  SKIN: No rashes or lesions  NERVOUS SYSTEM: Alert & Oriented X2-3 (name, location was hospital, year with prompting)  EXT: no peripheral edema  PSYCH: calm and cooperative     LABS:    10-29    134<L>  |  100  |  88<H>  ----------------------------<  103<H>  4.2   |  19<L>  |  3.45<H>    Ca    9.1      29 Oct 2021 19:06  Phos  5.0     10-29  Mg     2.20     10-29    TPro  6.5  /  Alb  3.5  /  TBili  <0.2  /  DBili  x   /  AST  7   /  ALT  7   /  AlkPhos  73  10-29                  Urinalysis Basic - ( 29 Oct 2021 23:53 )    Color: Light Yellow / Appearance: Slightly Turbid / S.010 / pH: x  Gluc: x / Ketone: Negative  / Bili: Negative / Urobili: <2 mg/dL   Blood: x / Protein: Trace / Nitrite: Negative   Leuk Esterase: Moderate / RBC: 1 /HPF / WBC 31 /HPF   Sq Epi: x / Non Sq Epi: 8 /HPF / Bacteria: Occasional                              9.0    6.72  )-----------( 344      ( 29 Oct 2021 19:06 )             26.8                         9.5    7.73  )-----------( 322      ( 28 Oct 2021 15:27 )             28.9     CAPILLARY BLOOD GLUCOSE      POCT Blood Glucose.: 92 mg/dL (29 Oct 2021 23:54)          RADIOLOGY & ADDITIONAL TESTS:            Imaging Personally Reviewed:  [ ] YES  [ ] NO    Consultants involved in case:   Consultant(s) Notes Reviewed:  [ ] YES  [ ] NO:   Care Discussed with Consultants/Other Providers [ ] YES  [ ] NO         standing/walking/toileting

## 2022-01-14 NOTE — ED PROVIDER NOTE - NS ED ROS FT
Review of Systems    Constitutional: (-) fever/ chills (+)loss of appetite or  weight loss  Eyes (-) visual changes  ENT: (-) epistaxis (-) sore throat (-) ear pain  Cardiovascular: (-) chest pain, (-) syncope (-) palpitations  Respiratory: (-) cough, (-) shortness of breath  Gastrointestinal: (-) vomiting, (-) diarrhea (-) abdominal pain  : (-) dysuria , hematuria   neck: (-) neck pain or stiffness  Musculoskeletal:  (-) back pain, (-) joint pain   Integumentary: (-) rash, (-) swelling  Neurological: (-) headache, (-) altered mental status (+)weakness
no

## 2023-08-07 NOTE — PROCEDURE NOTE - NSICDXPROCEDURE_GEN_ALL_CORE_FT
PROCEDURES:  Insertion, nasogastric tube 15-Apr-2021 18:31:05  Malinda Sanford  
show
PROCEDURES:  Complex insertion of Carter catheter 15-Apr-2021 18:34:52  Malinda Sanford

## 2024-06-15 NOTE — DISCHARGE NOTE PROVIDER - NSDCHHBASESERVICE_GEN_ALL_CORE
Physical Therapy    Patient not seen in therapy.     Order received, Chart review initiated. Per charge nurse, scheduled for Left hemiarthroplasty today due to Acute transcervical fracture of the left femoral neck s/p mechanical fall. Awaiting for PT orders post-op.       OBJECTIVE                         Therapy procedure time and total treatment time can be found documented on the Time Entry flowsheet   Nursing/Physical therapy

## 2025-02-26 NOTE — ED ADULT NURSE NOTE - NS ED NURSE LEVEL OF CONSCIOUSNESS AFFECT
Diet: Resume Normal Diet    Anticoagulation: Resume any medications as prescribed     Imaging Center at [phone] between 7:00AM and 4PM  Emergency Room 24-hour phone number [phone]    Call your doctor if you develop fever or chills within 24 hours of your procedure, significant tenderness lasting more than 1 week, bleeding, abdominal pain, chest pain, shortness of breath, or any unusual problems.      Follow up: in 3-5 business days with EVARISTO Gruber.      Thank you!  Neelam ALEJO, CHAPARRO  & Anuradha w, ultrasound   Calm